# Patient Record
Sex: FEMALE | Race: WHITE | Employment: OTHER | URBAN - METROPOLITAN AREA
[De-identification: names, ages, dates, MRNs, and addresses within clinical notes are randomized per-mention and may not be internally consistent; named-entity substitution may affect disease eponyms.]

---

## 2017-03-26 ENCOUNTER — HOSPITAL ENCOUNTER (EMERGENCY)
Age: 72
Discharge: HOME | End: 2017-03-26
Attending: EMERGENCY MEDICINE
Payer: MEDICARE

## 2017-03-26 VITALS
DIASTOLIC BLOOD PRESSURE: 69 MMHG | TEMPERATURE: 98.4 F | HEART RATE: 61 BPM | HEIGHT: 65 IN | WEIGHT: 148 LBS | SYSTOLIC BLOOD PRESSURE: 108 MMHG | OXYGEN SATURATION: 100 % | BODY MASS INDEX: 24.66 KG/M2 | RESPIRATION RATE: 16 BRPM

## 2017-03-26 DIAGNOSIS — M71.21 BAKER'S CYST OF KNEE, RIGHT: ICD-10-CM

## 2017-03-26 DIAGNOSIS — M79.604 RIGHT LEG PAIN: Primary | ICD-10-CM

## 2017-03-26 PROCEDURE — 99282 EMERGENCY DEPT VISIT SF MDM: CPT

## 2017-03-26 PROCEDURE — 93971 EXTREMITY STUDY: CPT

## 2017-03-26 NOTE — ED TRIAGE NOTES
Pt was walking Thursday and reports feeling like someone hit her with a baseball bat in the right calf. She now has ecchymosis and a pulling sensation in her right calf. She denies and recent injury or long trips.  She has a history of afib and takes ASA 81mg daily

## 2017-03-26 NOTE — PROCEDURES
Fay Garay  *** FINAL REPORT ***    Name: Bertrand Lindsay  MRN: [id number]  : 16 May 1945  HIS Order #: [id number]  [id number] [facility] Visit #: [id number]  Date: 26 Mar 2017    TYPE OF TEST: Peripheral Venous Testing    REASON FOR TEST  Pain in limb, Limb swelling    Right Leg:-  Deep venous thrombosis:           No  Superficial venous thrombosis:    No  Deep venous insufficiency:        No  Superficial venous insufficiency: No      INTERPRETATION/FINDINGS  PROCEDURE:  RIGHT LOWER EXTREMITY  VENOUS DUPLEX. Evaluation of lower  extremity veins with ultrasound (B-mode imaging, pulsed Doppler, color   Doppler). Includes the common femoral, deep femoral, femoral,  popliteal, posterior tibial, peroneal, and great saphenous veins. Other veins, for example the gastrocnemius and soleal veins, may also  be visualized. FINDINGS: Alayne Bras scale and color flow duplex images of the veins in the  right lower extremity demonstrate normal compressibility, spontaneous  and augmented flow profiles, and absence of filling defects throughout   the deep and superficial veins in the right lower extremity. CONCLUSION: Right lower extremity venous duplex negative for deep  venous thrombosis or thrombophlebitis. Left common femoral vein is  thrombus free. NOTE: Avascular hypoechoic region noted right popliteal   fossa measuring 2.05 cm by 1.25 cm, cosnsitent with a Baker's cyst.    ADDITIONAL COMMENTS    I have personally reviewed the data relevant to the interpretation of  this  study. TECHNOLOGIST: LYNDSEY Thomas  Signed: 2017 02:13 PM    PHYSICIAN: Cleo Paige.  Romel Chilel MD  Signed: 2017 02:59 PM

## 2017-03-26 NOTE — ED PROVIDER NOTES
HPI Comments: 66 y.o. female with past medical history significant for A-fib and hypothyroidism who presents from home with chief complaint of calf pain. Pt complains of right calf pain described as a pulling sensation with associated right shin ecchymosis. Pt denies any recent travel or trauma. Pt denies CP, SOB, or abdominal pain. Pt has no other complaints. There are no other acute medical concerns at this time. Social hx: Tobacco smoker, 1 pack daily for 50 years    PCP: Sera Paulson MD    Note written by Kayden. Alissa Rosas, as dictated by Kevin Rose MD 12:45 PM      The history is provided by the patient. No  was used. Past Medical History:   Diagnosis Date    A-fib [facility])     Atrial fibrillation ([facility] [address]     Hypothyroidism        Past Surgical History:   Procedure Laterality Date    CARDIAC SURG PROCEDURE UNLIST      cardioversionx4    HX AFIB ABLATION  2016    HX CATARACT REMOVAL Left     HX CHOLECYSTECTOMY  08/26/2016    HX ORTHOPAEDIC Bilateral     arthroscopic knee surgery         Family History:   Problem Relation Age of Onset    Heart Disease Father     Cancer Paternal Aunt        Social History     Social History    Marital status: SINGLE     Spouse name: N/A    Number of children: N/A    Years of education: N/A     Occupational History    Not on file. Social History Main Topics    Smoking status: Current Every Day Smoker     Packs/day: 1.00     Years: 50.00    Smokeless tobacco: Not on file    Alcohol use Yes      Comment: social    Drug use: No    Sexual activity: Not Currently     Partners: Male     Other Topics Concern    Not on file     Social History Narrative         ALLERGIES: Review of patient's allergies indicates no known allergies. Review of Systems   Constitutional: Negative for chills and fever. HENT: Negative for ear pain and sore throat. Eyes: Negative for photophobia and pain.    Respiratory: Negative for chest tightness and shortness of breath. Cardiovascular: Negative for chest pain and leg swelling. Gastrointestinal: Negative for abdominal pain, nausea and vomiting. Genitourinary: Negative for dysuria and flank pain. Musculoskeletal: Negative for back pain and neck pain. Right leg pain   Skin: Negative for rash and wound. Right shin ecchymosis    Neurological: Negative for dizziness, light-headedness and headaches. Vitals:    03/26/17 1234   BP: 132/67   Pulse: 67   Resp: 16   Temp: 98.4 °F (36.9 °C)   SpO2: 99%   Weight: 67.1 kg (148 lb)   Height: 5' 5\" (1.651 m)            Physical Exam   Constitutional: She is oriented to person, place, and time. She appears well-developed and well-nourished. No distress. HENT:   Head: Normocephalic and atraumatic. Eyes: Conjunctivae and EOM are normal.   Neck: Normal range of motion. Cardiovascular: Normal rate, regular rhythm and intact distal pulses. Pulmonary/Chest: Effort normal. No stridor. No respiratory distress. Musculoskeletal: Normal range of motion. She exhibits tenderness. She exhibits no edema or deformity. Mild ecchymosis with tenderness of the anterior right lower leg   Neurological: She is alert and oriented to person, place, and time. No cranial nerve deficit. Skin: Skin is warm and dry. She is not diaphoretic. Psychiatric: She has a normal mood and affect. Nursing note and vitals reviewed.        MDM  Number of Diagnoses or Management Options  Baker's cyst of knee, right:   Right leg pain:   Diagnosis management comments: Right lower leg swelling and pain - [facility]  Doppler ordered    1415 - doppler neg for DVt, +baker's cyst, no EMC, d/c home       Amount and/or Complexity of Data Reviewed  Tests in the radiology section of CPT®: ordered and reviewed      ED Course       Procedures    Results    DUPLEX LOWER EXT VENOUS RIGHT (Order [id number])         Allergies        No Known Allergies     Result Information      Status Provider Status        Preliminary result (Collected: 3/26/2017  2:13 PM) Ordered        Imaging      DUPLEX LOWER EXT VENOUS RIGHT (Order #[id number]) on 3/26/2017 - Imaging Information       Exam Information      Status Exam Begun    Exam Ended        Preliminary [70]           IR Summary Links      IR Procedure Log       Transcription      Type ID     Vascular Result TRK     Draft copy - this document is not available for patient care     Document Text        []Hide copied text  []Hover for attribution information     [facility]  ** PRELIMINARY REPORT **     Name: Arina Guevara  MRN: [id number]  : 16 May 1945  HIS Order #: [id number]  [id number] [facility] Visit #: [id number]  Date: 26 Mar 2017     TYPE OF TEST: Peripheral Venous Testing     REASON FOR TEST  Pain in limb, Limb swelling     Right Leg:-  Deep venous thrombosis: No  Superficial venous thrombosis: No  Deep venous insufficiency: No  Superficial venous insufficiency: No        INTERPRETATION/FINDINGS  PROCEDURE: RIGHT LOWER EXTREMITY VENOUS DUPLEX. Evaluation of lower  extremity veins with ultrasound (B-mode imaging, pulsed Doppler, color  Doppler). Includes the common femoral, deep femoral, femoral,  popliteal, posterior tibial, peroneal, and great saphenous veins. Other veins, for example the gastrocnemius and soleal veins, may also  be visualized.     FINDINGS: Gray scale and color flow duplex images of the veins in the  right lower extremity demonstrate normal compressibility, spontaneous  and augmented flow profiles, and absence of filling defects throughout  the deep and superficial veins in the right lower extremity.     CONCLUSION: Right lower extremity venous duplex negative for deep  venous thrombosis or thrombophlebitis. Left common femoral vein is  thrombus free.  NOTE: Avascular hypoechoic region noted right popliteal  fossa measuring 2.05 cm by 1.25 cm, cosnsitent with a Baker's cyst.     ADDITIONAL COMMENTS     I have personally reviewed the data relevant to the interpretation of  this study.     TECHNOLOGIST: LYNDSEY Elizalde  Signed: 03/26/2017 02:13 PM                      Display only: Transcription (Keyshawn)

## 2017-03-26 NOTE — DISCHARGE INSTRUCTIONS
Baker's Cyst: Care Instructions  Your Care Instructions    A Baker's cyst is a swelling behind the knee. It may cause pain or stiffness when you bend your knee or straighten it all the way. Baker's cysts are also called popliteal cysts. If you have arthritis or another condition that is the cause of the Baker's cyst, your doctor may treat that condition. A Baker's cyst may go away on its own. If not, or if it is causing a lot of discomfort, your doctor may drain the fluid that has built up behind the knee. In some cases, a Baker's cyst is removed in surgery. There are things you can do at home, such as staying off your leg, to reduce the swelling and pain. Follow-up care is a key part of your treatment and safety. Be sure to make and go to all appointments, and call your doctor if you are having problems. It's also a good idea to know your test results and keep a list of the medicines you take. How can you care for yourself at home? · Rest your knee as much as possible. · Ask your doctor if you can take an over-the-counter pain medicine, such as acetaminophen (Tylenol), ibuprofen (Advil, Motrin), or naproxen (Aleve). Be safe with medicines. Read and follow all instructions on the label. · Use a cane, a crutch, a walker, or another device if you need help to get around. These can help rest your knees. · If you have an elastic bandage, make sure it is snug but not so tight that your leg is numb, tingles, or swells below the bandage. Ask your doctor if you can loosen the bandage if it is too tight. · Follow your doctor's instructions about how much weight you can put on your knee. · Stay at a healthy weight. Being overweight puts extra strain on your knee. When should you call for help? Call 911 anytime you think you may need emergency care. For example, call if:  · You have sudden chest pain and shortness of breath, and you cough up blood.   Call your doctor now or seek immediate medical care if:  · You have signs of a blood clot, such as:  ¨ Pain in your calf, thigh, or groin. ¨ Redness and swelling in your leg or groin. · You have sudden swelling, warmth, or pain in any joint. · You have joint pain and a fever or rash. · You have such bad pain that you cannot use the joint. Watch closely for changes in your health, and be sure to contact your doctor if:  · You have mild joint symptoms that continue even with more than 6 weeks of care at home. · You have stomach pain or other problems with your medicine. Where can you learn more? Go to http://martínez-renny.info/. Enter R927 in the search box to learn more about \"Baker's Cyst: Care Instructions. \"  Current as of: May 23, 2016  Content Version: 11.1  © 0363-3175 [employer]. Care instructions adapted under license by "[employer]" (which disclaims liability or warranty for this information). If you have questions about a medical condition or this instruction, always ask your healthcare professional. [place] [address] any warranty or liability for your use of this information. We hope that we have addressed all of your medical concerns. The examination and treatment you received in the Emergency Department were for an emergent problem and were not intended as complete care. It is important that you follow up with your healthcare provider(s) for ongoing care. If your symptoms worsen or do not improve as expected, and you are unable to reach your usual health care provider(s), you should return to the Emergency Department. Today's healthcare is undergoing tremendous change, and patient satisfaction surveys are one of the many tools to assess the quality of medical care. You may receive a survey from the [employer] regarding your experience in the Emergency Department. I hope that your experience has been completely positive, particularly the medical care that I provided.   As such, please participate in the survey; anything less than excellent does not meet my expectations or intentions. [facility] and [facility] participate in nationally recognized quality of care measures. If your blood pressure is greater than 120/80, as reported below, we urge that you seek medical care to address the potential of high blood pressure, commonly known as hypertension. Hypertension can be hereditary or can be caused by certain medical conditions, pain, stress, or \"white coat syndrome. \"       Please make an appointment with your health care provider(s) for follow up of your Emergency Department visit. VITALS:   Patient Vitals for the past 8 hrs:   Temp Pulse Resp BP SpO2   17 1234 98.4 °F (36.9 °C) 67 16 132/67 99 %          Thank you for allowing us to provide you with medical care today. We realize that you have many choices for your emergency care needs. Please choose us in the future for any continued health care needs. Mecca Castaneda, [facility] / [facility], [facility].   Office: [phone]        [facility]  *** PRELIMINARY REPORT ***     Name: Selina Cabral  MRN: [id number]  : 16 May 1945  HIS Order #: [id number]  [id number] [facility] Visit #: [id number]  Date: 26 Mar 2017     TYPE OF TEST: Peripheral Venous Testing     REASON FOR TEST  Pain in limb, Limb swelling     Right Leg:-  Deep venous thrombosis: No  Superficial venous thrombosis: No  Deep venous insufficiency: No  Superficial venous insufficiency: No        INTERPRETATION/FINDINGS  PROCEDURE: RIGHT LOWER EXTREMITY VENOUS DUPLEX. Evaluation of lower  extremity veins with ultrasound (B-mode imaging, pulsed Doppler, color  Doppler). Includes the common femoral, deep femoral, femoral,  popliteal, posterior tibial, peroneal, and great saphenous veins.   Other veins, for example the gastrocnemius and soleal veins, may also  be visualized.     FINDINGS: Gray scale and color flow duplex images of the veins in the  right lower extremity demonstrate normal compressibility, spontaneous  and augmented flow profiles, and absence of filling defects throughout  the deep and superficial veins in the right lower extremity.     CONCLUSION: Right lower extremity venous duplex negative for deep  venous thrombosis or thrombophlebitis. Left common femoral vein is  thrombus free.  NOTE: Avascular hypoechoic region noted right popliteal  fossa measuring 2.05 cm by 1.25 cm, cosnsitent with a Baker's cyst.     ADDITIONAL COMMENTS     I have personally reviewed the data relevant to the interpretation of  this study.     TECHNOLOGIST: LYNDSEY Thomas  Signed: 03/26/2017 02:13 PM

## 2017-05-20 ENCOUNTER — APPOINTMENT (OUTPATIENT)
Dept: GENERAL RADIOLOGY | Age: 72
DRG: 310 | End: 2017-05-20
Attending: EMERGENCY MEDICINE
Payer: MEDICARE

## 2017-05-20 ENCOUNTER — HOSPITAL ENCOUNTER (INPATIENT)
Age: 72
LOS: 2 days | Discharge: HOME | DRG: 310 | End: 2017-05-22
Attending: EMERGENCY MEDICINE | Admitting: INTERNAL MEDICINE
Payer: MEDICARE

## 2017-05-20 DIAGNOSIS — I48.91 ATRIAL FIBRILLATION WITH RVR (HCC): Primary | ICD-10-CM

## 2017-05-20 PROBLEM — K04.7 DENTAL INFECTION: Status: ACTIVE | Noted: 2017-05-20

## 2017-05-20 LAB
ALBUMIN SERPL BCP-MCNC: 3.5 G/DL (ref 3.5–5)
ALBUMIN/GLOB SERPL: 0.9 {RATIO} (ref 1.1–2.2)
ALP SERPL-CCNC: 89 U/L (ref 45–117)
ALT SERPL-CCNC: 12 U/L (ref 12–78)
ANION GAP BLD CALC-SCNC: 8 MMOL/L (ref 5–15)
AST SERPL W P-5'-P-CCNC: 11 U/L (ref 15–37)
BASOPHILS # BLD AUTO: 0 K/UL (ref 0–0.1)
BASOPHILS # BLD: 0 % (ref 0–1)
BILIRUB SERPL-MCNC: 0.3 MG/DL (ref 0.2–1)
BNP SERPL-MCNC: 3019 PG/ML (ref 0–125)
BUN SERPL-MCNC: 19 MG/DL (ref 6–20)
BUN/CREAT SERPL: 20 (ref 12–20)
CALCIUM SERPL-MCNC: 8.8 MG/DL (ref 8.5–10.1)
CHLORIDE SERPL-SCNC: 104 MMOL/L (ref 97–108)
CK MB CFR SERPL CALC: NORMAL % (ref 0–2.5)
CK MB SERPL-MCNC: <1 NG/ML (ref 5–25)
CK SERPL-CCNC: 40 U/L (ref 26–192)
CO2 SERPL-SCNC: 27 MMOL/L (ref 21–32)
CREAT SERPL-MCNC: 0.95 MG/DL (ref 0.55–1.02)
EOSINOPHIL # BLD: 0.3 K/UL (ref 0–0.4)
EOSINOPHIL NFR BLD: 3 % (ref 0–7)
ERYTHROCYTE [DISTWIDTH] IN BLOOD BY AUTOMATED COUNT: 15.3 % (ref 11.5–14.5)
GLOBULIN SER CALC-MCNC: 3.9 G/DL (ref 2–4)
GLUCOSE SERPL-MCNC: 120 MG/DL (ref 65–100)
HCT VFR BLD AUTO: 43.1 % (ref 35–47)
HGB BLD-MCNC: 14.4 G/DL (ref 11.5–16)
INR PPP: 1 (ref 0.9–1.1)
LACTATE SERPL-SCNC: 1 MMOL/L (ref 0.4–2)
LIPASE SERPL-CCNC: 105 U/L (ref 73–393)
LYMPHOCYTES # BLD AUTO: 25 % (ref 12–49)
LYMPHOCYTES # BLD: 2.6 K/UL (ref 0.8–3.5)
MCH RBC QN AUTO: 31.4 PG (ref 26–34)
MCHC RBC AUTO-ENTMCNC: 33.4 G/DL (ref 30–36.5)
MCV RBC AUTO: 93.9 FL (ref 80–99)
MONOCYTES # BLD: 1 K/UL (ref 0–1)
MONOCYTES NFR BLD AUTO: 9 % (ref 5–13)
NEUTS SEG # BLD: 6.5 K/UL (ref 1.8–8)
NEUTS SEG NFR BLD AUTO: 63 % (ref 32–75)
PLATELET # BLD AUTO: 295 K/UL (ref 150–400)
POTASSIUM SERPL-SCNC: 3.6 MMOL/L (ref 3.5–5.1)
PROT SERPL-MCNC: 7.4 G/DL (ref 6.4–8.2)
PROTHROMBIN TIME: 10 SEC (ref 9–11.1)
RBC # BLD AUTO: 4.59 M/UL (ref 3.8–5.2)
SODIUM SERPL-SCNC: 139 MMOL/L (ref 136–145)
T4 FREE SERPL-MCNC: 1.4 NG/DL (ref 0.8–1.5)
TROPONIN I SERPL-MCNC: <0.04 NG/ML
TROPONIN I SERPL-MCNC: <0.04 NG/ML
TSH SERPL DL<=0.05 MIU/L-ACNC: 0.64 UIU/ML (ref 0.36–3.74)
WBC # BLD AUTO: 10.5 K/UL (ref 3.6–11)

## 2017-05-20 PROCEDURE — 74011250636 HC RX REV CODE- 250/636: Performed by: INTERNAL MEDICINE

## 2017-05-20 PROCEDURE — 74011250637 HC RX REV CODE- 250/637: Performed by: SPECIALIST

## 2017-05-20 PROCEDURE — 84443 ASSAY THYROID STIM HORMONE: CPT | Performed by: EMERGENCY MEDICINE

## 2017-05-20 PROCEDURE — 83880 ASSAY OF NATRIURETIC PEPTIDE: CPT | Performed by: EMERGENCY MEDICINE

## 2017-05-20 PROCEDURE — 93005 ELECTROCARDIOGRAM TRACING: CPT

## 2017-05-20 PROCEDURE — 99285 EMERGENCY DEPT VISIT HI MDM: CPT

## 2017-05-20 PROCEDURE — 84484 ASSAY OF TROPONIN QUANT: CPT | Performed by: EMERGENCY MEDICINE

## 2017-05-20 PROCEDURE — 74011250637 HC RX REV CODE- 250/637: Performed by: INTERNAL MEDICINE

## 2017-05-20 PROCEDURE — 74011000258 HC RX REV CODE- 258: Performed by: EMERGENCY MEDICINE

## 2017-05-20 PROCEDURE — 85610 PROTHROMBIN TIME: CPT | Performed by: EMERGENCY MEDICINE

## 2017-05-20 PROCEDURE — 82550 ASSAY OF CK (CPK): CPT | Performed by: EMERGENCY MEDICINE

## 2017-05-20 PROCEDURE — 74011000250 HC RX REV CODE- 250: Performed by: EMERGENCY MEDICINE

## 2017-05-20 PROCEDURE — 83605 ASSAY OF LACTIC ACID: CPT | Performed by: EMERGENCY MEDICINE

## 2017-05-20 PROCEDURE — 74011250636 HC RX REV CODE- 250/636: Performed by: EMERGENCY MEDICINE

## 2017-05-20 PROCEDURE — 83690 ASSAY OF LIPASE: CPT | Performed by: EMERGENCY MEDICINE

## 2017-05-20 PROCEDURE — 71010 XR CHEST PORT: CPT

## 2017-05-20 PROCEDURE — 65660000000 HC RM CCU STEPDOWN

## 2017-05-20 PROCEDURE — 94761 N-INVAS EAR/PLS OXIMETRY MLT: CPT

## 2017-05-20 PROCEDURE — 99284 EMERGENCY DEPT VISIT MOD MDM: CPT

## 2017-05-20 PROCEDURE — 96365 THER/PROPH/DIAG IV INF INIT: CPT

## 2017-05-20 PROCEDURE — 74011250636 HC RX REV CODE- 250/636: Performed by: SPECIALIST

## 2017-05-20 PROCEDURE — 94762 N-INVAS EAR/PLS OXIMTRY CONT: CPT

## 2017-05-20 PROCEDURE — 96376 TX/PRO/DX INJ SAME DRUG ADON: CPT

## 2017-05-20 PROCEDURE — 85025 COMPLETE CBC W/AUTO DIFF WBC: CPT | Performed by: EMERGENCY MEDICINE

## 2017-05-20 PROCEDURE — 80053 COMPREHEN METABOLIC PANEL: CPT | Performed by: EMERGENCY MEDICINE

## 2017-05-20 PROCEDURE — 36415 COLL VENOUS BLD VENIPUNCTURE: CPT | Performed by: INTERNAL MEDICINE

## 2017-05-20 PROCEDURE — 84439 ASSAY OF FREE THYROXINE: CPT | Performed by: EMERGENCY MEDICINE

## 2017-05-20 PROCEDURE — 87040 BLOOD CULTURE FOR BACTERIA: CPT | Performed by: EMERGENCY MEDICINE

## 2017-05-20 RX ORDER — SODIUM CHLORIDE 9 MG/ML
75 INJECTION, SOLUTION INTRAVENOUS CONTINUOUS
Status: DISCONTINUED | OUTPATIENT
Start: 2017-05-20 | End: 2017-05-21

## 2017-05-20 RX ORDER — GUAIFENESIN 100 MG/5ML
81 LIQUID (ML) ORAL DAILY
Status: DISCONTINUED | OUTPATIENT
Start: 2017-05-21 | End: 2017-05-22 | Stop reason: HOSPADM

## 2017-05-20 RX ORDER — ENOXAPARIN SODIUM 100 MG/ML
1 INJECTION SUBCUTANEOUS
Status: DISCONTINUED | OUTPATIENT
Start: 2017-05-20 | End: 2017-05-20

## 2017-05-20 RX ORDER — ENOXAPARIN SODIUM 100 MG/ML
40 INJECTION SUBCUTANEOUS EVERY 24 HOURS
Status: DISCONTINUED | OUTPATIENT
Start: 2017-05-20 | End: 2017-05-20

## 2017-05-20 RX ORDER — ENOXAPARIN SODIUM 100 MG/ML
1 INJECTION SUBCUTANEOUS EVERY 12 HOURS
Status: DISCONTINUED | OUTPATIENT
Start: 2017-05-20 | End: 2017-05-20

## 2017-05-20 RX ORDER — ENOXAPARIN SODIUM 100 MG/ML
30 INJECTION SUBCUTANEOUS EVERY 12 HOURS
Status: DISCONTINUED | OUTPATIENT
Start: 2017-05-20 | End: 2017-05-21

## 2017-05-20 RX ORDER — CLINDAMYCIN HYDROCHLORIDE 150 MG/1
150 CAPSULE ORAL EVERY 6 HOURS
COMMUNITY
End: 2017-05-22

## 2017-05-20 RX ORDER — SODIUM CHLORIDE 0.9 % (FLUSH) 0.9 %
5-10 SYRINGE (ML) INJECTION EVERY 8 HOURS
Status: DISCONTINUED | OUTPATIENT
Start: 2017-05-20 | End: 2017-05-22 | Stop reason: HOSPADM

## 2017-05-20 RX ORDER — DILTIAZEM HYDROCHLORIDE 5 MG/ML
20 INJECTION INTRAVENOUS
Status: COMPLETED | OUTPATIENT
Start: 2017-05-20 | End: 2017-05-20

## 2017-05-20 RX ORDER — ACETAMINOPHEN 325 MG/1
650 TABLET ORAL
Status: DISCONTINUED | OUTPATIENT
Start: 2017-05-20 | End: 2017-05-22 | Stop reason: HOSPADM

## 2017-05-20 RX ORDER — SODIUM CHLORIDE 0.9 % (FLUSH) 0.9 %
5-10 SYRINGE (ML) INJECTION AS NEEDED
Status: DISCONTINUED | OUTPATIENT
Start: 2017-05-20 | End: 2017-05-22 | Stop reason: HOSPADM

## 2017-05-20 RX ORDER — ENOXAPARIN SODIUM 100 MG/ML
40 INJECTION SUBCUTANEOUS EVERY 12 HOURS
Status: DISCONTINUED | OUTPATIENT
Start: 2017-05-20 | End: 2017-05-21

## 2017-05-20 RX ORDER — LEVOTHYROXINE SODIUM 88 UG/1
88 TABLET ORAL
Status: DISCONTINUED | OUTPATIENT
Start: 2017-05-21 | End: 2017-05-22 | Stop reason: HOSPADM

## 2017-05-20 RX ORDER — HYDROCODONE BITARTRATE AND ACETAMINOPHEN 5; 325 MG/1; MG/1
1 TABLET ORAL
Status: DISCONTINUED | OUTPATIENT
Start: 2017-05-20 | End: 2017-05-22 | Stop reason: HOSPADM

## 2017-05-20 RX ORDER — METOPROLOL TARTRATE 25 MG/1
25 TABLET, FILM COATED ORAL EVERY 6 HOURS
Status: DISCONTINUED | OUTPATIENT
Start: 2017-05-20 | End: 2017-05-21

## 2017-05-20 RX ORDER — CLINDAMYCIN HYDROCHLORIDE 150 MG/1
150 CAPSULE ORAL EVERY 6 HOURS
Status: DISCONTINUED | OUTPATIENT
Start: 2017-05-20 | End: 2017-05-22 | Stop reason: HOSPADM

## 2017-05-20 RX ORDER — SODIUM CHLORIDE 0.9 % (FLUSH) 0.9 %
5-10 SYRINGE (ML) INJECTION AS NEEDED
Status: DISCONTINUED | OUTPATIENT
Start: 2017-05-20 | End: 2017-05-20 | Stop reason: SDUPTHER

## 2017-05-20 RX ADMIN — ENOXAPARIN SODIUM 40 MG: 40 INJECTION SUBCUTANEOUS at 19:57

## 2017-05-20 RX ADMIN — CLINDAMYCIN HYDROCHLORIDE 150 MG: 150 CAPSULE ORAL at 19:57

## 2017-05-20 RX ADMIN — HYDROCODONE BITARTRATE AND ACETAMINOPHEN 1 TABLET: 5; 325 TABLET ORAL at 20:53

## 2017-05-20 RX ADMIN — ENOXAPARIN SODIUM 30 MG: 30 INJECTION SUBCUTANEOUS at 19:58

## 2017-05-20 RX ADMIN — METOPROLOL TARTRATE 25 MG: 25 TABLET ORAL at 19:57

## 2017-05-20 RX ADMIN — DILTIAZEM HYDROCHLORIDE 2.5 MG/HR: 5 INJECTION INTRAVENOUS at 16:00

## 2017-05-20 RX ADMIN — SODIUM CHLORIDE 1000 ML: 900 INJECTION, SOLUTION INTRAVENOUS at 15:43

## 2017-05-20 RX ADMIN — DILTIAZEM HYDROCHLORIDE 20 MG: 5 INJECTION INTRAVENOUS at 15:44

## 2017-05-20 RX ADMIN — Medication 10 ML: at 23:20

## 2017-05-20 RX ADMIN — METOPROLOL TARTRATE 25 MG: 25 TABLET ORAL at 23:20

## 2017-05-20 RX ADMIN — Medication 10 ML: at 17:11

## 2017-05-20 RX ADMIN — SODIUM CHLORIDE 75 ML/HR: 900 INJECTION, SOLUTION INTRAVENOUS at 20:00

## 2017-05-20 NOTE — H&P
[address]  [phone]    Hospitalist Admission Note      NAME:  Divya Helm   :   1945   MRN:  [id number]     PCP:  None     Date/Time:  2017 4:49 PM          Subjective:     CHIEF COMPLAINT: Palpitations, SOB     HISTORY OF PRESENT ILLNESS:     Ms. Chuy Marley is a 74 y.o.  female with a hx of hypothyroidism, Afib, tobacco abuse who presented to the Emergency Department complaining of 2 weeks of progressive maxillary and mouth pain associated with headache and neck pain. Sx evolved over past 2 days to include exertional dyspnea and palpitations. Patient was seen by a dentist and placed on clindamycin with f/u appt with periodontist next week. She reports pleuritic chest pain. She is not on anticoag other than baby aspirin. We will admit for further management. Past Medical History:   Diagnosis Date    A-fib [facility])     Atrial fibrillation ([address]     Hypothyroidism         Past Surgical History:   Procedure Laterality Date    CARDIAC SURG PROCEDURE UNLIST      cardioversionx4    HX AFIB ABLATION  2016    HX CATARACT REMOVAL Left     HX CHOLECYSTECTOMY  2016    HX ORTHOPAEDIC Bilateral     arthroscopic knee surgery       Social History   Substance Use Topics    Smoking status: Current Every Day Smoker     Packs/day: 1.00     Years: 50.00    Smokeless tobacco: Not on file    Alcohol use Yes      Comment: social        Family History   Problem Relation Age of Onset    Heart Disease Father     Cancer Paternal Aunt         No Known Allergies     Prior to Admission medications    Medication Sig Start Date End Date Taking? Authorizing Provider   clindamycin (CLEOCIN) 150 mg capsule Take 150 mg by mouth every six (6) hours. Yes Historical Provider   levothyroxine (SYNTHROID) 88 mcg tablet Take 88 mcg by mouth Daily (before breakfast).    Yes Historical Provider   aspirin delayed-release 81 mg tablet Take 81 mg by mouth daily. Last dose per dr Alejandra Haile   Yes Historical Provider       Review of Systems:  (bold if positive, if negative)    Gen:  Eyes:  ENT:  CVS:  Palpitations, chest painPulm:  dyspneaGI:    :    MS:  Skin:  Psych:  Endo:    Hem:  Renal:    Neuro:        Objective:      VITALS:    Vital signs reviewed; most recent are:    Visit Vitals    /60    Pulse (!) 108    Temp 98.5 °F (36.9 °C)    Resp 15    Ht 5' 5\" (1.651 m)    Wt 72.6 kg (160 lb)    SpO2 98%    BMI 26.63 kg/m2     SpO2 Readings from Last 6 Encounters:   05/20/17 98%   03/26/17 100%   08/28/16 94%   09/25/15 95%   08/19/14 100%   04/01/14 93%        No intake or output data in the 24 hours ending 05/20/17 1649     Exam:     Physical Exam:    Gen:  Well-developed, well-nourished, in no acute distress  HEENT:  Pink conjunctivae, PERRL, hearing intact to voice, moist mucous membranes, mouth with multiple dental caries but no overt abscess, no gangrenous gingivitis, no maxillary or mandibular tenderness  Neck:  Supple, without masses, thyroid non-tender  Resp:  No accessory muscle use, clear breath sounds without wheezes rales or rhonchi  Card:   Tachy, irregularly irregular No murmurs, normal S1, S2 without thrills, bruits or peripheral edema  Abd:  Soft, non-tender, non-distended, normoactive bowel sounds are present, no palpable organomegaly and no detectable hernias  Lymph:  No cervical or inguinal adenopathy  Musc:  No cyanosis or clubbing  Skin:  No rashes or ulcers, skin turgor is good  Neuro:  Cranial nerves are grossly intact, no focal motor weakness, follows commands appropriately  Psych:  Good insight, oriented to person, place and time, alert     Labs:    Recent Labs      05/20/17   1540   WBC  10.5   HGB  14.4   HCT  43.1   PLT  295     Recent Labs      05/20/17   1540   NA  139   K  3.6   CL  104   CO2  27   GLU  120*   BUN  19   CREA  0.95   CA  8.8   ALB  3.5   TBILI  0.3   SGOT  11*   ALT  12     No results found for: GLUCPOC  No results for input(s): PH, PCO2, PO2, HCO3, FIO2 in the last 72 hours. Recent Labs      05/20/17   1540   INR  1.0     All Micro Results     None        ECG: Reviewed. Atrial fibrillation with rapid ventricular response    I have reviewed previous records       Assessment and Plan: Active Problems:    Atrial fibrillation with rapid ventricular response S/P ablation of atrial fibrillation. Precipitating event may be dental infection v. Structural heart disease with elevated pro-BNP. Admit to SD. IV dilt gtt. Cardiology consult. Serial Edin. Update TTE. Monitor on tele. Watch resp status closely. CHADSVASC score is 2 and ideally would need an [address], [facility] ppx for now (no need for bridging therapy). Hashimoto's thyroiditis. TSH low nml. Continue LT4    Dental infection. No overt abscess or clinical signs of spread to sinus or calvarium. Continue clindamycin. Outpatient follow-up with periodontist     Tobacco abuse disorder.  Counseled on smoking cessation    Telemetry reviewed:   AFIB    Risk of deterioration: high      Total time spent with patient: [address] discussed with: Patient, Nursing Staff and >50% of time spent in counseling and coordination of care    Discussed:  Care Plan and D/C Planning       ___________________________________________________    Attending Physician: Brittnee Walker DO

## 2017-05-20 NOTE — IP AVS SNAPSHOT
[address] 
[phone] Patient: Missy Ng MRN: [id number] RLS:9/02/[birth year]99 You are allergic to the following No active allergies Recent Documentation Height Weight Breastfeeding? BMI OB Status Smoking Status 1.651 m 69.4 kg No 25.46 kg/m2 Postmenopausal Current Every Day Smoker Unresulted Labs Order Current Status CULTURE, BLOOD Preliminary result CULTURE, BLOOD Preliminary result Emergency Contacts Name Discharge Info Relation Home Work Mobile [address] [facility] CAREGIVER [3] Child [2] [phone] About your hospitalization You were admitted on:  May 20, 2017 You [facility] received [facility] in the:  [facility] You were discharged on:  May 22, 2017 Unit phone number:  [phone] Why you were hospitalized Your primary diagnosis was:  Not on File Your diagnoses also included:  Atrial Fibrillation With Rapid Ventricular Response (Hcc), Dental Infection, S/P Ablation Of Atrial Fibrillation, Hashimoto's Thyroiditis Providers Seen During Your Hospitalizations Provider Role Specialty Primary office phone Quinton Lawson MD Attending Provider Emergency Medicine [phone] Lyly Serrano MD Attending Provider Internal Medicine [phone] Karri Fields DO Attending Provider Internal Medicine [phone] Your Primary Care Physician (PCP) Primary Care Physician Office Phone Office Fax NONE ** None ** ** None ** Follow-up Information Follow up With Details Comments Contact Info Samuel Cabrera MD On 6/5/2017 9:40 am  566 [facility] [phone] [address] [place] [address] 
[phone] Current Discharge Medication List  
  
START taking these medications Dose & Instructions Dispensing Information Comments Morning Noon Evening Bedtime HYDROcodone-acetaminophen 5-325 mg per tablet Commonly known as:  Alivia Partida Your last dose was: Your next dose is:    
   
   
 Dose:  1 Tab Take 1 Tab by mouth every four (4) hours as needed. Max Daily Amount: 6 Tabs. Quantity:  12 Tab Refills:  0  
     
   
   
   
  
 metoprolol tartrate 50 mg tablet Commonly known as:  LOPRESSOR Your last dose was: Your next dose is:    
   
   
 Dose:  50 mg Take 1 Tab by mouth two (2) times a day. Quantity:  60 Tab Refills:  0  
     
   
   
   
  
 rivaroxaban 20 mg Tab tablet Commonly known as:  Yulissa Moreland Your last dose was: Your next dose is:    
   
   
 Dose:  20 mg Take 1 Tab by mouth daily (with dinner). Quantity:  30 Tab Refills:  0 CONTINUE these medications which have NOT CHANGED Dose & Instructions Dispensing Information Comments Morning Noon Evening Bedtime  
 aspirin delayed-release 81 mg tablet Your last dose was: Your next dose is:    
   
   
 Dose:  81 mg Take 81 mg by mouth daily. Last dose per dr Alejandra Haile Refills:  0  
     
   
   
   
  
 levothyroxine 88 mcg tablet Commonly known as:  SYNTHROID Your last dose was: Your next dose is:    
   
   
 Dose:  88 mcg Take 88 mcg by mouth Daily (before breakfast). Refills:  0 STOP taking these medications   
 clindamycin 150 mg capsule Commonly known as:  CLEOCIN Where to Get Your Medications Information on where to get these meds will be given to you by the nurse or doctor. ! Ask your nurse or doctor about these medications HYDROcodone-acetaminophen 5-325 mg per tablet  
 metoprolol tartrate 50 mg tablet  
 rivaroxaban 20 mg Tab tablet Discharge Instructions Patient Discharge Instructions Ramakrishna Nino / [id number] : 1945 Admitted 2017 Discharged: 2017 Primary Diagnoses Problem List as of 5/22/2017  Date Reviewed: 5/20/2017 Codes Class Noted - Resolved Atrial fibrillation with rapid ventricular response (HCC) ICD-10-CM: I48.91 
ICD-9-CM: 427.31  5/20/2017 - Present Dental infection ICD-10-CM: K04.7 ICD-9-CM: 522.4  5/20/2017 - Present Calculus of gallbladder with chronic cholecystitis without obstruction ICD-10-CM: K80.10 ICD-9-CM: 574.10  8/15/2016 - Present S/P ablation of atrial fibrillation ICD-10-CM: Z98.890, Z86.79 
ICD-9-CM: V45.89  8/15/2016 - Present Hashimoto's thyroiditis ICD-10-CM: E06.3 ICD-9-CM: 245.2  8/15/2016 - Present Irritable bowel syndrome with diarrhea ICD-10-CM: K58.0 ICD-9-CM: 564.1  8/15/2016 - Present Paroxysmal a-fib (HCC) ICD-10-CM: I48.0 ICD-9-CM: 427.31  9/24/2015 - Present RESOLVED: Cataract ICD-10-CM: H26.9 ICD-9-CM: 366.9  8/19/2014 - 8/19/2014 Take Home Medications · It is important that you take the medication exactly as they are prescribed. · Keep your medication in the bottles provided by the pharmacist and keep a list of the medication names, dosages, and times to be taken in your wallet. · Do not take other medications without consulting your doctor. What to do at [facility] Recommended diet: Cardiac Diet Recommended activity: Activity as tolerated If you experience sudden onset chest pain, shortness of breath that doesn't improve with rest, please follow up with nearest ER. Follow-up with your Dr. Michael Yanes as scheduled Information obtained by : 
I understand that if any problems occur once I am at home I am to contact my physician. I understand and acknowledge receipt of the instructions indicated above.   
 
                                                                                                                                     
Physician's or R.N.'s Signature Date/Time Patient or Representative Signature                                                          Date/Time Atrial Fibrillation: Care Instructions Your Care Instructions Atrial fibrillation is an irregular and often fast heartbeat. Treating this condition is important for several reasons. It can cause blood clots, which can travel from your heart to your brain and cause a stroke. If you have a fast heartbeat, you may feel lightheaded, dizzy, and weak. An irregular heartbeat can also increase your risk for heart failure. Atrial fibrillation is often the result of another heart condition, such as high blood pressure or coronary artery disease. Making changes to improve your heart condition will help you stay healthy and active. Follow-up care is a key part of your treatment and safety. Be sure to make and go to all appointments, and call your doctor if you are having problems. It's also a good idea to know your test results and keep a list of the medicines you take. How can you care for yourself at home? Medicines · Take your medicines exactly as prescribed. Call your doctor if you think you are having a problem with your medicine. You will get more details on the specific medicines your doctor prescribes. · If your doctor has given you a blood thinner to prevent a stroke, be sure you get instructions about how to take your medicine safely. Blood thinners can cause serious bleeding problems. · Do not take any vitamins, over-the-counter drugs, or herbal products without talking to your doctor first. 
Lifestyle changes · Do not smoke. Smoking can increase your chance of a stroke and heart attack. If you need help quitting, talk to your doctor about stop-smoking programs and medicines.  These can increase your chances of quitting for good. 
· Eat a heart-healthy diet. · Stay at a healthy weight. Lose weight if you need to. · Limit alcohol to 2 drinks a day for men and 1 drink a day for women. Too much alcohol can cause health problems. · Avoid colds and flu. Get a pneumococcal vaccine shot. If you have had one before, ask your doctor whether you need another dose. Get a flu shot every year. If you must be around people with colds or flu, wash your hands often. Activity · If your doctor recommends it, get more exercise. Walking is a good choice. Bit by bit, increase the amount you walk every day. Try for at least 30 minutes on most days of the week. You also may want to swim, bike, or do other activities. Your doctor may suggest that you join a cardiac rehabilitation program so that you can have help increasing your physical activity safely. · Start light exercise if your doctor says it is okay. Even a small amount will help you get stronger, have more energy, and manage stress. Walking is an easy way to get exercise. Start out by walking a little more than you did in the hospital. Gradually increase the amount you walk. · When you exercise, watch for signs that your heart is working too hard. You are pushing too hard if you cannot talk while you are exercising. If you become short of breath or dizzy or have chest pain, sit down and rest immediately. · Check your pulse regularly. Place two fingers on the artery at the palm side of your wrist, in line with your thumb. If your heartbeat seems uneven or fast, talk to your doctor. When should you call for help? Call 911 anytime you think you may need emergency care. For example, call if: 
· You have symptoms of a heart attack. These may include: ¨ Chest pain or pressure, or a strange feeling in the chest. 
¨ Sweating. ¨ Shortness of breath. ¨ Nausea or vomiting. ¨ Pain, pressure, or a strange feeling in the back, neck, jaw, or upper belly or in one or both shoulders or arms. ¨ Lightheadedness or sudden weakness. ¨ A fast or irregular heartbeat. After you call 911, the  may tell you to chew 1 adult-strength or 2 to 4 low-dose aspirin. Wait for an ambulance. Do not try to drive yourself. · You have symptoms of a stroke. These may include: 
¨ Sudden numbness, tingling, weakness, or loss of movement in your face, arm, or leg, especially on only one side of your body. ¨ Sudden vision changes. ¨ Sudden trouble speaking. ¨ Sudden confusion or trouble understanding simple statements. ¨ Sudden problems with walking or balance. ¨ A sudden, severe headache that is different from past headaches. · You passed out (lost consciousness). Call your doctor now or seek immediate medical care if: 
· You have new or increased shortness of breath. · You feel dizzy or lightheaded, or you feel like you may faint. · Your heart rate becomes irregular. · You can feel your heart flutter in your chest or skip heartbeats. Tell your doctor if these symptoms are new or worse. Watch closely for changes in your health, and be sure to contact your doctor if you have any problems. Where can you learn more? Go to http://martínez-renny.info/. Enter U020 in the search box to learn more about \"Atrial Fibrillation: Care Instructions. \" Current as of: January 27, 2016 Content Version: 11.2 © 2393-9774 [employer]. Care instructions adapted under license by [employer] (which disclaims liability or warranty for this information). If you have questions about a medical condition or this instruction, always ask your healthcare professional. [place] [address] any warranty or liability for your use of this information. Learning About Atrial Fibrillation What is atrial fibrillation? Atrial fibrillation (say \"AY-tree-jesus ahp-lrrd-EYH-shun\") is the most common type of irregular heartbeat (arrhythmia).  Normally, the heart beats in a strong, steady rhythm. In atrial fibrillation, a problem with the heart's electrical system causes the two upper parts of the heart (the atria) to quiver, or fibrillate. Your heart rate also may be faster than normal. 
Atrial fibrillation can be dangerous because if the heartbeat isn't strong and steady, blood can collect, or pool, in the atria. And pooled blood is more likely to form clots. Clots can travel to the brain, block blood flow, and cause a stroke. Atrial fibrillation can also lead to heart failure. Treatment for atrial fibrillation helps prevent stroke and heart failure. It also helps relieve symptoms. Atrial fibrillation is often caused by another heart problem. It may happen after heart surgery. It may also be caused by other problems, such as an overactive thyroid gland or lung disease. Many people with atrial fibrillation are able to live full and active lives. What are the symptoms? Some people feel symptoms when they have episodes of atrial fibrillation. But other people don't notice any symptoms. If you have symptoms, you may feel: · A fluttering, racing, or pounding feeling in your chest called palpitations. · Weak or tired. · Dizzy or lightheaded. · Short of breath. · Chest pain. · Confused. You may notice signs of atrial fibrillation when you check your pulse. Your pulse may seem uneven or fast. 
What can you expect when you have atrial fibrillation? At first, spells of atrial fibrillation may come on suddenly and last a short time. It may go away on its own or it goes away after treatment. This is called paroxysmal atrial fibrillation. Over time, the spells may last longer and occur more often. They often don't go away on their own. How is it treated? Treatments can help you feel better and prevent future problems, especially stroke and heart failure.  
The main types of treatment slow the heart rate, control the heart rhythm, and help prevent stroke. Your treatment will depend on the cause of your atrial fibrillation, your symptoms, and your risk for stroke. · Heart rate treatment. Medicine may be used to slow your heart rate. Your heartbeat may still be irregular. But these medicines keep your heart from beating too fast. They may also help relieve your symptoms. · Heart rhythm treatment. Different treatments may be used to try to stop atrial fibrillation and keep it from returning. They can also relieve symptoms. These treatments include medicine, electrical cardioversion to shock the heart back to a normal rhythm, a procedure called catheter ablation, and heart surgery. · Stroke prevention. You and your doctor can decide how to lower your risk. You may decide to take a blood-thinning medicine, such as aspirin or an anticoagulant. How can you live well with it? You can live well and help manage atrial fibrillation by having a heart-healthy lifestyle. To have a heart-healthy lifestyle: · Don't smoke. · Eat heart-healthy foods. · Be active. Talk to your doctor about what type and level of exercise is safe for you. · Stay at a healthy weight. Lose weight if you need to. · Manage stress. · Avoid alcohol if it triggers symptoms. · Manage other health problems such as high blood pressure, high cholesterol, and diabetes. · Avoid getting sick from the flu. Get a flu shot every year. When should you call for help? Call 911 anytime you think you may need emergency care. For example, call if: 
· You have symptoms of a stroke. These may include: 
¨ Sudden numbness, tingling, weakness, or loss of movement in your face, arm, or leg, especially on only one side of your body. ¨ Sudden vision changes. ¨ Sudden trouble speaking. ¨ Sudden confusion or trouble understanding simple statements. ¨ Sudden problems with walking or balance. ¨ A sudden, severe headache that is different from past headaches. Call your doctor now or seek immediate medical care if: 
· You have new or increased shortness of breath. · You feel dizzy or lightheaded, or you feel like you may faint. Watch closely for changes in your health, and be sure to contact your doctor if you have any problems. Follow-up care is a key part of your treatment and safety. Be sure to make and go to all appointments, and call your doctor if you are having problems. It's also a good idea to know your test results and keep a list of the medicines you take. Where can you learn more? Go to http://martínez-renny.info/. Enter [phone] in the search box to learn more about \"Learning About Atrial Fibrillation. \" Current as of: March 28, 2016 Content Version: 11.2 © 3953-3475 [employer]. Care instructions adapted under license by [employer] (which disclaims liability or warranty for this information). If you have questions about a medical condition or this instruction, always ask your healthcare professional. [place] [address] any warranty or liability for your use of this information. Discharge Orders None Introducing [facility]! [facility] introduces be2 patient portal. Now you can access parts of your medical record, email your doctor's office, and request medication refills online. 1. In your internet browser, go to [email or web address] 2. Click on the First Time User? Click Here link in the Sign In box. You will see the New Member Sign Up page. 3. Enter your be2 Access Code exactly as it appears below. You will not need to use this code after youve completed the sign-up process. If you do not sign up before the expiration date, you must request a new code. · be2 Access Code: [email or web address] Expires: 6/24/2017  1:07 PM 
 
4.  Enter the last four digits of your Social Security Number (xxxx) and Date of Birth (mm/dd/yyyy) as indicated and click Submit. You will be taken to the next sign-up page. 5. Create a PushSpring ID. This will be your PushSpring login ID and cannot be changed, so think of one that is secure and easy to remember. 6. Create a PushSpring password. You can change your password at any time. 7. Enter your Password Reset Question and Answer. This can be used at a later time if you forget your password. 8. Enter your e-mail address. You will receive e-mail notification when new information is available in [address] 9. Click Sign Up. You can now view and download portions of your medical record. 10. Click the Download Summary menu link to download a portable copy of your medical information. If you have questions, please visit the Frequently Asked Questions section of the PushSpring website. Remember, PushSpring is NOT to be used for urgent needs. For medical emergencies, dial 911. Now available from your iPhone and Android! General Information Please provide this summary of care documentation to your next provider. Patient Signature:  ____________________________________________________________ Date:  ____________________________________________________________  
  
Tomas Breath Provider Signature:  ____________________________________________________________ Date:  ____________________________________________________________

## 2017-05-20 NOTE — CONSULTS
Chelly Loyola MD [facility] - [facility] [address]  Office [phone]  Mobile [phone]    Date of  Admission: 5/20/2017  3:27 PM  PCP- None    Anjelica Su is a 74 y.o. female admitted for Atrial fibrillation with rapid ventricular response ([facility] [address] Consult requested by Estela Mcfarlane MD    Assessment  · Recurrent AF with RVR, s/p AF ablation 2015  · Associated with chest/jaw pain  · Recent MARY        Discussion/Recommendations:  Duration of AF unclear, chest and jaw pain may represent demand ischemia from tachycardia. No evidence of myocardial injury despite prolonged sx. Continue rate control with iv dilt, start oral metoprolol and anticoagulate with lovenox, eventually switch to Xarelto. Discussed rhythm control eventually. CAD evaluation with cath vs stress test based on clinical course    Unclear if she has underlying dental/sinus infection. Subjective:  Fair historian. Hx of AF s/p multiple cardioversions, s/p PV isolation 2015 in [place]. No structural or ischemic heart disease other than moderate MR by echo 2015 when in AF. No hx of HTN, DM. No alcohol or significant caffeine use. Significant family stressors noted. Reports right dental pain for past week, saw dentist - no clear infection but referred to periodontist. Noted MARY past 2 days. Yesterday developed jaw, neck and chest pain at rest, waxing/waning, much worse this morning. Seen in ED - AF with RVR. Sx resolved after rate controlled with iv dilt. Troponin negative. CXR negative. Patient denies any  palpitations, syncope, orthopnea, edema or paroxysmal nocturnal dyspnea. No hx of bleeding issues - tolerated Xarelto previously. No hx of stroke. Recently saw [facility] doc to establish cardiac care since moving to [place] from [facility].      Past Medical History:   Diagnosis Date    Atrial fibrillation ([facility] [address]     s/p pulm vein isolation 2015    Hypothyroidism       Past Surgical History: Procedure Laterality Date    CARDIAC SURG PROCEDURE UNLIST      cardioversionx4    HX AFIB ABLATION  2016    HX CATARACT REMOVAL Left     HX CHOLECYSTECTOMY  08/26/2016    HX ORTHOPAEDIC Bilateral     arthroscopic knee surgery     No current facility-administered medications on file prior to encounter. Current Outpatient Prescriptions on File Prior to Encounter   Medication Sig Dispense Refill    levothyroxine (SYNTHROID) 88 mcg tablet Take 88 mcg by mouth Daily (before breakfast).  aspirin delayed-release 81 mg tablet Take 81 mg by mouth daily. Last dose per dr Kain Black       No Known Allergies          Review of Symptoms:  Constitutional: negative for fevers and chills  Respiratory: negative for cough, sputum or hemoptysis  Gastrointestinal: negative for dysphagia, odynophagia, dyspepsia and abdominal pain  Musculoskeletal:negative for myalgias and arthralgias  Neurological: negative for vertigo, seizures and memory problems  Other systems reviewed and negative except as above. Physical Exam    Visit Vitals    /74    Pulse (!) 101    Temp 98.5 °F (36.9 °C)    Resp 16    Ht 5' 5\" (1.651 m)    Wt 160 lb (72.6 kg)    SpO2 97%    Breastfeeding No    BMI 26.63 kg/m2     Visit Vitals    /74    Pulse (!) 101    Temp 98.5 °F (36.9 °C)    Resp 16    Ht 5' 5\" (1.651 m)    Wt 160 lb (72.6 kg)    SpO2 97%    Breastfeeding No    BMI 26.63 kg/m2     General Appearance:  Well developed, well nourished,alert and oriented x 3, and individual in no acute distress. Ears/Nose/Mouth/Throat:   Hearing grossly normal.         Neck: Supple. Chest:   Lungs clear to auscultation bilaterally. Cardiovascular:  Regular rate and rhythm, S1, S2 normal, no murmur. Abdomen:   Soft, non-tender, bowel sounds are active. Extremities: No edema bilaterally. Skin: Warm and dry.                Cardiographics    Telemetry: AFIB/90s  ECG: atrial fibrillation, rate 140s        Recent Results (from the past 12 hour(s))   EKG, 12 LEAD, INITIAL    Collection Time: 05/20/17  3:33 PM   Result Value Ref Range    Ventricular Rate 171 BPM    Atrial Rate 182 BPM    QRS Duration 76 ms    Q-T Interval 272 ms    QTC Calculation (Bezet) 458 ms    Calculated R Axis 9 degrees    Calculated T Axis -174 degrees    Diagnosis       Atrial fibrillation with rapid ventricular response  Nonspecific ST and T wave abnormality  Abnormal ECG  No previous ECGs available     PROTHROMBIN TIME + INR    Collection Time: 05/20/17  3:40 PM   Result Value Ref Range    INR 1.0 0.9 - 1.1      Prothrombin time 10.0 9.0 - 11.1 sec   TROPONIN I    Collection Time: 05/20/17  3:40 PM   Result Value Ref Range    Troponin-I, Qt. <0.04 <0.05 ng/mL   LIPASE    Collection Time: 05/20/17  3:40 PM   Result Value Ref Range    Lipase 105 73 - 500 U/L   METABOLIC PANEL, COMPREHENSIVE    Collection Time: 05/20/17  3:40 PM   Result Value Ref Range    Sodium 139 136 - 145 mmol/L    Potassium 3.6 3.5 - 5.1 mmol/L    Chloride 104 97 - 108 mmol/L    CO2 27 21 - 32 mmol/L    Anion gap 8 5 - 15 mmol/L    Glucose 120 (H) 65 - 100 mg/dL    BUN 19 6 - 20 MG/DL    Creatinine 0.95 0.55 - 1.02 MG/DL    BUN/Creatinine ratio 20 12 - 20      GFR est AA >60 >60 ml/min/1.73m2    GFR est non-AA 58 (L) >60 ml/min/1.73m2    Calcium 8.8 8.5 - 10.1 MG/DL    Bilirubin, total 0.3 0.2 - 1.0 MG/DL    ALT (SGPT) 12 12 - 78 U/L    AST (SGOT) 11 (L) 15 - 37 U/L    Alk.  phosphatase 89 45 - 117 U/L    Protein, total 7.4 6.4 - 8.2 g/dL    Albumin 3.5 3.5 - 5.0 g/dL    Globulin 3.9 2.0 - 4.0 g/dL    A-G Ratio 0.9 (L) 1.1 - 2.2     CK W/ CKMB & INDEX    Collection Time: 05/20/17  3:40 PM   Result Value Ref Range    CK 40 26 - 192 U/L    CK - MB <1.0 <3.6 NG/ML    CK-MB Index Cannot be calulated 0 - 2.5     CBC WITH AUTOMATED DIFF    Collection Time: 05/20/17  3:40 PM   Result Value Ref Range    WBC 10.5 3.6 - 11.0 K/uL    RBC 4.59 3.80 - 5.20 M/uL    HGB 14.4 11.5 - 16.0 g/dL    HCT 43.1 35.0 - 47.0 %    MCV 93.9 80.0 - 99.0 FL    MCH 31.4 26.0 - 34.0 PG    MCHC 33.4 30.0 - 36.5 g/dL    RDW 15.3 (H) 11.5 - 14.5 %    PLATELET 197 038 - 381 K/uL    NEUTROPHILS 63 32 - 75 %    LYMPHOCYTES 25 12 - 49 %    MONOCYTES 9 5 - 13 %    EOSINOPHILS 3 0 - 7 %    BASOPHILS 0 0 - 1 %    ABS. NEUTROPHILS 6.5 1.8 - 8.0 K/UL    ABS. LYMPHOCYTES 2.6 0.8 - 3.5 K/UL    ABS. MONOCYTES 1.0 0.0 - 1.0 K/UL    ABS. EOSINOPHILS 0.3 0.0 - 0.4 K/UL    ABS.  BASOPHILS 0.0 0.0 - 0.1 K/UL   PRO-BNP    Collection Time: 05/20/17  3:40 PM   Result Value Ref Range    NT pro-BNP 3019 (H) 0 - 125 PG/ML   TSH 3RD GENERATION    Collection Time: 05/20/17  3:40 PM   Result Value Ref Range    TSH 0.64 0.36 - 3.74 uIU/mL

## 2017-05-20 NOTE — PROGRESS NOTES
[facility]: MED RECONCILIATION    Comments/Recommendations:   Patient states she started her Clindamycin about 3-4 days ago. She admits she does not always take all four doses per day. Medication(s) ADDED to PTA list:  1. Clindamycin 150 mg Q 6 hrs x 7 days     Medication(s) REMOVED from PTA list:  1. none    Medication(s) ADJUSTED on PTA list:  1. none      Information obtained from: patient/ Rx Query    Significant PMH/Disease States:   Past Medical History:   Diagnosis Date    [facility]fib ([facility] [address]     Atrial fibrillation ([facility] [address]     Hypothyroidism        Chief Complaint for this Admission:   Chief Complaint   Patient presents with    Chest Pain         Allergies: Review of patient's allergies indicates no known allergies. Prior to Admission Medications:     Medication Documentation Review Audit       Reviewed by Shahla Page, PHARMD (Pharmacist) on 05/20/17 at 1646         Medication Sig Documenting Provider Last Dose Status Taking?      aspirin delayed-release 81 mg tablet Take 81 mg by mouth daily. Last dose per dr Enedelia Romero Historical Provider 5/20/2017 am Active Yes    clindamycin (CLEOCIN) 150 mg capsule Take 150 mg by mouth every six (6) hours. Historical Provider 5/20/2017 1400 Active Yes    levothyroxine (SYNTHROID) 88 mcg tablet Take 88 mcg by mouth Daily (before breakfast).  Historical Provider 5/20/2017 am Active Yes                        LUIS ARMANDO Estrada   Contact: [phone]

## 2017-05-20 NOTE — ED TRIAGE NOTES
Pt c/o jaw pain chest pain and headache that started yesterday. She has a history of afib. Pt is on cell phone in triage.

## 2017-05-20 NOTE — ED PROVIDER NOTES
HPI Comments: 74 y.o. female with past medical history significant for atrial fibrillation and hypothyroidism who presents from home with chief complaint of chest pain. Pt states that she had jaw pain and ear pain for the past two weeks. Pt originally thought she had a toothache and made an appointment with a periodontist next week. She reports starting to feel chest pain yesterday morning. Pt states that the pain is sharp and pleuritic with associated shortness of breath. She reports that the pain radiates to her jaw, ears, throat, and the back of her head. Pt has hx of a fib and has been taking baby aspirin after her cardioversion. Pt denies having fever, chills, N/V/D, vaginal discharge, dizziness, or numbness. She denies any recent travel. Pt is originally from [place] and is only in [place] for 6 months. There are no other acute medical concerns at this time. Social hx: everyday smoker, + EtOH use, no drug use  PCP: Joey Guzman MD    Note written by Anya Umanzor, as dictated by Zay Domingo MD 3:44 PM      The history is provided by the patient. No  was used. Past Medical History:   Diagnosis Date    A-fib [facility])     Atrial fibrillation ([address]     Hypothyroidism        Past Surgical History:   Procedure Laterality Date    CARDIAC SURG PROCEDURE UNLIST      cardioversionx4    HX AFIB ABLATION  2016    HX CATARACT REMOVAL Left     HX CHOLECYSTECTOMY  08/26/2016    HX ORTHOPAEDIC Bilateral     arthroscopic knee surgery         Family History:   Problem Relation Age of Onset    Heart Disease Father     Cancer Paternal Aunt        Social History     Social History    Marital status: SINGLE     Spouse name: N/A    Number of children: N/A    Years of education: N/A     Occupational History    Not on file.      Social History Main Topics    Smoking status: Current Every Day Smoker     Packs/day: 1.00     Years: 50.00    Smokeless tobacco: Not on file    Alcohol use Yes      Comment: social    Drug use: No    Sexual activity: Not Currently     Partners: Male     Other Topics Concern    Not on file     Social History Narrative         ALLERGIES: Review of patient's allergies indicates no known allergies. Review of Systems   Constitutional: Negative for chills and fever. HENT: Positive for ear pain and sore throat. Respiratory: Positive for shortness of breath. Cardiovascular: Positive for chest pain. Gastrointestinal: Negative for diarrhea, nausea and vomiting. Genitourinary: Negative for vaginal discharge. Neurological: Positive for headaches. Negative for dizziness and numbness. All other systems reviewed and are negative. Vitals:    05/20/17 1611 05/20/17 1612 05/20/17 1613 05/20/17 1619   BP:       Pulse: 99 (!) 106 (!) 108    Resp: 17 17 15    Temp:       SpO2: 99% 99% 98% 98%   Weight:       Height:                Physical Exam   Constitutional: She is oriented to person, place, and time. She appears well-developed and well-nourished. NAD, AxOx4, speaking in complete sentences     HENT:   Head: Normocephalic and atraumatic. Nose: Nose normal.   Mouth/Throat: Oropharynx is clear and moist.   Cn intact     Eyes: Conjunctivae and EOM are normal. Pupils are equal, round, and reactive to light. Right eye exhibits no discharge. Left eye exhibits no discharge. No scleral icterus. Neck: Normal range of motion. Neck supple. No JVD present. No tracheal deviation present. Cardiovascular: Normal heart sounds and intact distal pulses. Exam reveals no gallop and no friction rub. No murmur heard. Pulmonary/Chest: Effort normal and breath sounds normal. No respiratory distress. She has no wheezes. She has no rales. She exhibits no tenderness. Abdominal: Soft. Bowel sounds are normal. There is no tenderness. There is no rebound and no guarding. Genitourinary: No vaginal discharge found.    Genitourinary Comments: Pt denies urinary/ vaginal complaints   Musculoskeletal: Normal range of motion. She exhibits no edema or tenderness. Neurological: She is alert and oriented to person, place, and time. She has normal reflexes. No cranial nerve deficit. She exhibits normal muscle tone. Coordination normal.   pt has motor/ CV/ Sensation grossly intact to all extremities, R = L in strength;   Skin: Skin is warm and dry. No rash noted. No erythema. No pallor. Psychiatric: She has a normal mood and affect. Her behavior is normal. Thought content normal.   Nursing note and vitals reviewed. MDM  Number of Diagnoses or Management Options  Atrial fibrillation with RVR ([address]):   Risk of Complications, Morbidity, and/or Mortality  Presenting problems: high  Diagnostic procedures: moderate  Management options: moderate    Critical Care  Total time providing critical care: 30-74 minutes (35 min  )    ED Course       Procedures    Chief Complaint   Patient presents with    Chest Pain       3:29 PM  The patients presenting problems have been discussed, and they are in agreement with the care plan formulated and outlined with them. I have encouraged them to ask questions as they arise throughout their visit. MEDICATIONS GIVEN:  Medications - No data to display    LABS REVIEWED:  Labs Reviewed - No data to display    RADIOLOGY RESULTS:  The following have been ordered and reviewed:  _____________________________________________________________________  _____________________________________________________________________    EKG interpretation: (Preliminary)  Rhythm: a fib w/ RVR rhythm; and ir-regular . Rate (approx.): 171; Axis: normal; P wave: normal; QRS interval: normal ; ST/T wave: normal; Negative acute significant segmental elevations    PROCEDURES:        CONSULTATIONS:   CONSULT NOTE:  4:33 PM Gael Sheehan MD spoke with Dr. Shae Baron, Consult for Cardiology. Discussed available diagnostic tests and clinical findings.   He is in agreement with care plans as outlined. PROGRESS NOTES:      DIAGNOSIS:    1. Atrial fibrillation with RVR (HCC)        PLAN:  1- fib w/ RVR; rate controlled w/ Dilt/ agrees w/ admission/ further evaluation;       ED COURSE: The patients hospital course has been uncomplicated. 4:09 PM  HR now in 90's/ 'feels much better now'; awaiting results;        4:35 PM  Patient is being evaluated for admission to the hospital by the hospitalist, Dr Noel Green . The results of their tests and reasons for their admission have been discussed with them and/or available family. They convey agreement and understanding for the need to be admitted and for their admission diagnosis. Consultation has been made with the inpatient physician specialist for hospitalization.

## 2017-05-21 ENCOUNTER — APPOINTMENT (OUTPATIENT)
Dept: CT IMAGING | Age: 72
DRG: 310 | End: 2017-05-21
Attending: SPECIALIST
Payer: MEDICARE

## 2017-05-21 LAB
ANION GAP BLD CALC-SCNC: 7 MMOL/L (ref 5–15)
APPEARANCE UR: CLEAR
BACTERIA URNS QL MICRO: NEGATIVE /HPF
BILIRUB UR QL: NEGATIVE
BUN SERPL-MCNC: 15 MG/DL (ref 6–20)
BUN/CREAT SERPL: 20 (ref 12–20)
CALCIUM SERPL-MCNC: 8.3 MG/DL (ref 8.5–10.1)
CHLORIDE SERPL-SCNC: 108 MMOL/L (ref 97–108)
CO2 SERPL-SCNC: 25 MMOL/L (ref 21–32)
COLOR UR: ABNORMAL
CREAT SERPL-MCNC: 0.75 MG/DL (ref 0.55–1.02)
EPITH CASTS URNS QL MICRO: ABNORMAL /LPF
GLUCOSE SERPL-MCNC: 100 MG/DL (ref 65–100)
GLUCOSE UR STRIP.AUTO-MCNC: NEGATIVE MG/DL
HGB UR QL STRIP: ABNORMAL
HYALINE CASTS URNS QL MICRO: ABNORMAL /LPF (ref 0–5)
KETONES UR QL STRIP.AUTO: NEGATIVE MG/DL
LEUKOCYTE ESTERASE UR QL STRIP.AUTO: NEGATIVE
NITRITE UR QL STRIP.AUTO: NEGATIVE
PH UR STRIP: 5.5 [PH] (ref 5–8)
POTASSIUM SERPL-SCNC: 4 MMOL/L (ref 3.5–5.1)
PROT UR STRIP-MCNC: NEGATIVE MG/DL
RBC #/AREA URNS HPF: ABNORMAL /HPF (ref 0–5)
SODIUM SERPL-SCNC: 140 MMOL/L (ref 136–145)
SP GR UR REFRACTOMETRY: 1.02 (ref 1–1.03)
TROPONIN I SERPL-MCNC: <0.04 NG/ML
UROBILINOGEN UR QL STRIP.AUTO: 0.2 EU/DL (ref 0.2–1)
WBC URNS QL MICRO: ABNORMAL /HPF (ref 0–4)

## 2017-05-21 PROCEDURE — 93306 TTE W/DOPPLER COMPLETE: CPT

## 2017-05-21 PROCEDURE — 74011250637 HC RX REV CODE- 250/637: Performed by: SPECIALIST

## 2017-05-21 PROCEDURE — 74011250636 HC RX REV CODE- 250/636: Performed by: SPECIALIST

## 2017-05-21 PROCEDURE — 80048 BASIC METABOLIC PNL TOTAL CA: CPT | Performed by: INTERNAL MEDICINE

## 2017-05-21 PROCEDURE — 36415 COLL VENOUS BLD VENIPUNCTURE: CPT | Performed by: INTERNAL MEDICINE

## 2017-05-21 PROCEDURE — 81001 URINALYSIS AUTO W/SCOPE: CPT | Performed by: EMERGENCY MEDICINE

## 2017-05-21 PROCEDURE — 74011250636 HC RX REV CODE- 250/636: Performed by: INTERNAL MEDICINE

## 2017-05-21 PROCEDURE — 74011250637 HC RX REV CODE- 250/637: Performed by: INTERNAL MEDICINE

## 2017-05-21 PROCEDURE — 65660000000 HC RM CCU STEPDOWN

## 2017-05-21 RX ORDER — FUROSEMIDE 20 MG/1
20 TABLET ORAL DAILY
Status: DISCONTINUED | OUTPATIENT
Start: 2017-05-22 | End: 2017-05-21

## 2017-05-21 RX ORDER — ALPRAZOLAM 0.25 MG/1
0.25 TABLET ORAL
Status: DISCONTINUED | OUTPATIENT
Start: 2017-05-21 | End: 2017-05-22 | Stop reason: HOSPADM

## 2017-05-21 RX ORDER — METOPROLOL TARTRATE 50 MG/1
50 TABLET ORAL 2 TIMES DAILY
Status: DISCONTINUED | OUTPATIENT
Start: 2017-05-21 | End: 2017-05-22 | Stop reason: HOSPADM

## 2017-05-21 RX ORDER — ONDANSETRON 2 MG/ML
4 INJECTION INTRAMUSCULAR; INTRAVENOUS
Status: DISCONTINUED | OUTPATIENT
Start: 2017-05-21 | End: 2017-05-22 | Stop reason: HOSPADM

## 2017-05-21 RX ADMIN — METOPROLOL TARTRATE 50 MG: 50 TABLET, FILM COATED ORAL at 18:28

## 2017-05-21 RX ADMIN — LEVOTHYROXINE SODIUM 88 MCG: 88 TABLET ORAL at 08:30

## 2017-05-21 RX ADMIN — Medication 10 ML: at 14:00

## 2017-05-21 RX ADMIN — CLINDAMYCIN HYDROCHLORIDE 150 MG: 150 CAPSULE ORAL at 08:29

## 2017-05-21 RX ADMIN — ENOXAPARIN SODIUM 30 MG: 30 INJECTION SUBCUTANEOUS at 08:29

## 2017-05-21 RX ADMIN — CLINDAMYCIN HYDROCHLORIDE 150 MG: 150 CAPSULE ORAL at 01:13

## 2017-05-21 RX ADMIN — HYDROCODONE BITARTRATE AND ACETAMINOPHEN 1 TABLET: 5; 325 TABLET ORAL at 01:13

## 2017-05-21 RX ADMIN — Medication 10 ML: at 21:42

## 2017-05-21 RX ADMIN — HYDROCODONE BITARTRATE AND ACETAMINOPHEN 1 TABLET: 5; 325 TABLET ORAL at 16:32

## 2017-05-21 RX ADMIN — Medication 10 ML: at 07:20

## 2017-05-21 RX ADMIN — ENOXAPARIN SODIUM 40 MG: 40 INJECTION SUBCUTANEOUS at 08:29

## 2017-05-21 RX ADMIN — HYDROCODONE BITARTRATE AND ACETAMINOPHEN 1 TABLET: 5; 325 TABLET ORAL at 12:21

## 2017-05-21 RX ADMIN — CLINDAMYCIN HYDROCHLORIDE 150 MG: 150 CAPSULE ORAL at 16:23

## 2017-05-21 RX ADMIN — CLINDAMYCIN HYDROCHLORIDE 150 MG: 150 CAPSULE ORAL at 21:42

## 2017-05-21 RX ADMIN — ASPIRIN 81 MG CHEWABLE TABLET 81 MG: 81 TABLET CHEWABLE at 08:29

## 2017-05-21 RX ADMIN — SODIUM CHLORIDE 500 ML: 900 INJECTION, SOLUTION INTRAVENOUS at 03:26

## 2017-05-21 RX ADMIN — METOPROLOL TARTRATE 25 MG: 25 TABLET ORAL at 07:20

## 2017-05-21 RX ADMIN — RIVAROXABAN 20 MG: 10 TABLET, FILM COATED ORAL at 18:27

## 2017-05-21 NOTE — PROGRESS NOTES
Yusuf Hannon MD [facility] [address]   Office [phone]  Mobile [phone]            NAME:  Nikia Sykes   :   1945   MRN:   [id number]     Assessment/Plan:   1. Recurrent AF 2 years post ablation. Rate okay. 2. Intermittent jaw,chest pain. Troponins negative. Pleuritic component. Consider Chest CTA. Stress nuclear tomorrow if negative  3. MARY - doubt this is HF. Elevated BNP due to AF. 4. Anxiety     Continue metoprolol but change dose/frequency to bid  Replace lovenox with Xarelto  Chest CTA today? Stress nuclear study if negative    Would not start diuretic particularly given low BPs      Subjective:   Cardiac ROS: recurrent chest/jaw pain at rest, pleuritic at times. . Patient denies any  palpitations, syncope, orthopnea, edema or paroxysmal nocturnal dyspnea. Review of Systems: No nausea, indigestion, vomiting, pain, cough, sputum. No bleeding. Taking po. Appetite fair      Objective:     Visit Vitals    /60    Pulse 80    Temp 97.9 °F (36.6 °C)    Resp 15    Ht 5' 5\" (1.651 m)    Wt 154 lb 4.8 oz (70 kg)    SpO2 97%    Breastfeeding No    BMI 25.68 kg/m2      O2 Device: Room air    Temp (24hrs), Av.1 °F (36.7 °C), Min:97.5 °F (36.4 °C), Max:98.5 °F (36.9 °C)            1901 -  0700  In: 240 [P.O.:240]  Out: 400 [Urine:400]    TELE:   General: AAOx3 cooperative, no acute distress. HEENT: Atraumatic. Pink and moist.  Anicteric sclerae. Neck : Supple, no thyromegaly. Lungs: CTA bilaterally. No wheezing/rhonchi/rales. Heart: Irregular rhythm, no murmur, no rubs, no gallops. No JVD. No carotid bruits. Abdomen: Soft, non-distended, non-tender. + Bowel sounds. No bruits. Extremities: No edema, no clubbing, no cyanosis. No calf tenderness  Neurologic: Grossly intact. Alert and oriented X 3. No acute neurological distress. Psych: Good insight. Not anxious nor agitated.     Additional comments: None    Care Plan discussed with:    Comments   Patient x    Family      RN     Care Manager                    Consultant:  x        Data Review:     EKG    Echo    No results for input(s): TNIPOC in the last 72 hours.     No lab exists for component: ITNL   Recent Labs      05/20/17   2326  05/20/17   1734  05/20/17   1540   CPK   --    --   40   CKMB   --    --   <1.0   TROIQ  <0.04  <0.04  <0.04     Recent Labs      05/21/17   0444  05/20/17   1540   NA  140  139   K  4.0  3.6   CL  108  104   CO2  25  27   BUN  15  19   CREA  0.75  0.95   GLU  100  120*   ALB   --   3.5   WBC   --   10.5   HGB   --   14.4   HCT   --   43.1   PLT   --   295     Recent Labs      05/20/17   1540   INR  1.0   PTP  10.0       Medications reviewed  Current Facility-Administered Medications   Medication Dose Route Frequency    ondansetron (ZOFRAN) injection 4 mg  4 mg IntraVENous Q6H PRN    [START ON 5/22/2017] furosemide (LASIX) tablet 20 mg  20 mg Oral DAILY    ALPRAZolam (XANAX) tablet 0.25 mg  0.25 mg Oral QID PRN    dilTIAZem (CARDIZEM) 125 mg in dextrose 5% 125 mL infusion  0-15 mg/hr IntraVENous TITRATE    sodium chloride (NS) flush 5-10 mL  5-10 mL IntraVENous Q8H    0.9% sodium chloride infusion  75 mL/hr IntraVENous CONTINUOUS    aspirin chewable tablet 81 mg  81 mg Oral DAILY    acetaminophen (TYLENOL) tablet 650 mg  650 mg Oral Q4H PRN    HYDROcodone-acetaminophen (NORCO) 5-325 mg per tablet 1 Tab  1 Tab Oral Q4H PRN    sodium chloride (NS) flush 5-10 mL  5-10 mL IntraVENous PRN    metoprolol tartrate (LOPRESSOR) tablet 25 mg  25 mg Oral Q6H    enoxaparin (LOVENOX) injection 30 mg  30 mg SubCUTAneous Q12H    And    enoxaparin (LOVENOX) injection 40 mg  40 mg SubCUTAneous Q12H    clindamycin (CLEOCIN) capsule 150 mg  150 mg Oral Q6H    levothyroxine (SYNTHROID) tablet 88 mcg  88 mcg Oral ACB         Jaci Forte MD

## 2017-05-21 NOTE — PROGRESS NOTES
Primary Nurse Franca Brady RN and Nicol Blackwood RN performed a dual skin assessment on this patient No impairment noted  Shola score is 23

## 2017-05-21 NOTE — PROGRESS NOTES
TRANSFER - IN REPORT:    Verbal report received from Familia Brooke (name) on Lew Mcgowan  being received from ED (unit) for routine progression of care      Report consisted of patients Situation, Background, Assessment and   Recommendations(SBAR). Information from the following report(s) SBAR, Kardex, MAR and Med Rec Status was reviewed with the receiving nurse. Opportunity for questions and clarification was provided. Assessment completed upon patients arrival to unit and care assumed. Patient voided  X 2 and missed the hat, unable to get accurate I/O's.    0320  Spoke to Dr. Tasneem Hatch about patient's blood pressure, running in 92'X systolic with map trending lower 60-64. Order given for 500ml bolus. Diltiazem already discontinued. Will continue to monitor. 0730  Bedside and Verbal shift change report given to [employer] (oncoming nurse) by Soraya Chacko (offgoing nurse). Report included the following information SBAR, Kardex, MAR and Recent Results.

## 2017-05-21 NOTE — ED NOTES
TRANSFER - OUT REPORT:    Verbal report given to Su DELACRUZ(name) on Luana Sanchez  being transferred to [place] [id number](unit) for routine progression of care       Report consisted of patients Situation, Background, Assessment and   Recommendations(SBAR). Information from the following report(s) SBAR, Kardex, ED Summary and Med Rec Status was reviewed with the receiving nurse. Lines:   Peripheral IV 05/20/17 Right Antecubital (Active)   Site Assessment Clean, dry, & intact 5/20/2017  3:39 PM   Phlebitis Assessment 0 5/20/2017  3:39 PM   Infiltration Assessment 0 5/20/2017  3:39 PM   Dressing Status Clean, dry, & intact 5/20/2017  3:39 PM   Dressing Type Tape;Transparent 5/20/2017  3:39 PM   Hub Color/Line Status Pink 5/20/2017  3:39 PM       Peripheral IV 05/20/17 Right Wrist (Active)   Site Assessment Clean, dry, & intact 5/20/2017  6:20 PM   Phlebitis Assessment 0 5/20/2017  6:20 PM   Infiltration Assessment 0 5/20/2017  6:20 PM   Dressing Status Clean, dry, & intact 5/20/2017  6:20 PM   Dressing Type Tape;Transparent 5/20/2017  6:20 PM   Hub Color/Line Status Blue 5/20/2017  6:20 PM        Opportunity for questions and clarification was provided.       Patient transported with:   Monitor  Tech

## 2017-05-21 NOTE — PROGRESS NOTES
Dre Purdy [facility]  [address] [facility], [place], [address] [place] [address]  [phone]      Medical Progress Note      NAME: Grant Mix   :  1945  MRM:  [id number]    Date/Time: 2017  11:21 AM         Subjective:     Chief Complaint: \"I'm okay\"     No complaints this AM. Off dilt gtt. No complaints currently. Denies CP but does feel SOB. ROS:  (bold if positive, if negative)      SOB        Objective:       Vitals:          Last 24hrs VS reviewed since prior progress note. Most recent are:    Visit Vitals    /77    Pulse 71    Temp 98.3 °F (36.8 °C)    Resp 16    Ht 5' 5\" (1.651 m)    Wt 70 kg (154 lb 4.8 oz)    SpO2 97%    Breastfeeding No    BMI 25.68 kg/m2     SpO2 Readings from Last 6 Encounters:   17 97%   17 100%   16 94%   09/25/15 95%   14 100%   14 93%          Intake/Output Summary (Last 24 hours) at 17 1121  Last data filed at 17 0515   Gross per 24 hour   Intake              240 ml   Output              400 ml   Net             -160 ml          Exam:     Physical Exam:    Gen:  Well-developed, well-nourished, in no acute distress  HEENT:  Pink conjunctivae, PERRL, hearing intact to voice, moist mucous membranes  Neck:  Supple, without masses, thyroid non-tender  Resp:  Bibasilar crackles. Card:  HR in the 90's.  No murmurs, normal S1, S2 without thrills, bruits or peripheral edema  Abd:  Soft, non-tender, non-distended, normoactive bowel sounds are present  Musc:  No cyanosis or clubbing  Skin:  No rashes or ulcers, skin turgor is good  Neuro:  Cranial nerves 3-12 are grossly intact,  strength is 5/5 bilaterally and dorsi / plantarflexion is 5/5 bilaterally, follows commands appropriately  Psych:  Good insight, oriented to person, place and time, alert    Medications Reviewed: (see below)    Lab Data Reviewed: (see below)    ______________________________________________________________________    Medications: Current Facility-Administered Medications   Medication Dose Route Frequency    ondansetron (ZOFRAN) injection 4 mg  4 mg IntraVENous Q6H PRN    dilTIAZem (CARDIZEM) 125 mg in dextrose 5% 125 mL infusion  0-15 mg/hr IntraVENous TITRATE    sodium chloride (NS) flush 5-10 mL  5-10 mL IntraVENous Q8H    0.9% sodium chloride infusion  75 mL/hr IntraVENous CONTINUOUS    aspirin chewable tablet 81 mg  81 mg Oral DAILY    acetaminophen (TYLENOL) tablet 650 mg  650 mg Oral Q4H PRN    HYDROcodone-acetaminophen (NORCO) 5-325 mg per tablet 1 Tab  1 Tab Oral Q4H PRN    sodium chloride (NS) flush 5-10 mL  5-10 mL IntraVENous PRN    metoprolol tartrate (LOPRESSOR) tablet 25 mg  25 mg Oral Q6H    enoxaparin (LOVENOX) injection 30 mg  30 mg SubCUTAneous Q12H    And    enoxaparin (LOVENOX) injection 40 mg  40 mg SubCUTAneous Q12H    clindamycin (CLEOCIN) capsule 150 mg  150 mg Oral Q6H    levothyroxine (SYNTHROID) tablet 88 mcg  88 mcg Oral ACB            Lab Review:     Recent Labs      05/20/17   1540   WBC  10.5   HGB  14.4   HCT  43.1   PLT  295     Recent Labs      05/21/17   0444  05/20/17   1540   NA  140  139   K  4.0  3.6   CL  108  104   CO2  25  27   GLU  100  120*   BUN  15  19   CREA  0.75  0.95   CA  8.3*  8.8   ALB   --   3.5   SGOT   --   11*   ALT   --   12   INR   --   1.0     No components found for: [place]         Assessment / Plan:     Atrial fibrillation with rapid ventricular response (HCC) (5/20/2017)/    S/P ablation of atrial fibrillation (8/15/2016)  -off dilt gtt  -Mg>2, K, 4  -check TFT's   -TTE pending   -continue metoprolol and therapeutic Lovenox   -cardiology on board   -add GENTLE diuresis; proBNP > 3000 and mild bibasilar crackles with subjective SOB       Hashimoto's thyroiditis (8/15/2016)  -continue levothyroxine   -TFT's pending       Dental infection (5/20/2017)  -on clind  -scheduled to see the periodontist on 5/22; may need to reschedule     Total time spent with patient: 30 Minutes                  Care Plan discussed with: Patient, Family and Nursing Staff    Discussed:  Code Status, Care Plan and D/C Planning    Prophylaxis:  Lovenox    Disposition:  Home w/Family           ___________________________________________________    Attending Physician: Dorothea Mendoza MD

## 2017-05-21 NOTE — PROGRESS NOTES
Pt refused to get Chest CTA ordered by Dr. Boaz Kendrick . Demanded reasons for the test.\" I will leave if doctors not to talk to me\". Per Dr. Boaz Kendrick chest CTA can be cancelled .

## 2017-05-21 NOTE — PROGRESS NOTES
Pt upset about the CTA and stating she will leave AMA. Called pt to discuss. First, pt accused me of not seeing her and she doesn't recall even meeting me. I proceeded but reiterating what we had discussed about the need for TTE, the \"plumbing\" and \"electrical\" systems of her heart. I also explained that she had told me about her dental pain and her peridontal appt on 1PM. I asked her how I would know that information without having seen her this AM. She then stated she wanted and MRI. I asked her to clarify what we would be looking at on this MRI. She felt that her teeth needed to be evaluated. I explained that both the dentist and peridontist would be evaluating her teeth but that we are concerned about the jaw pain radiating to the chest as well as the a-fib with RVR. A stress is to be completed tomorrow but unclear if pt will stay for this test. Unclear why she does not recall prior conversations. ?dementia ?medication though did not appear at all sedated during our conversation early ?did not realize I was a physician despite the white coat and introductions.

## 2017-05-22 ENCOUNTER — APPOINTMENT (OUTPATIENT)
Dept: NUCLEAR MEDICINE | Age: 72
DRG: 310 | End: 2017-05-22
Attending: NURSE PRACTITIONER
Payer: MEDICARE

## 2017-05-22 VITALS
BODY MASS INDEX: 25.49 KG/M2 | RESPIRATION RATE: 20 BRPM | HEART RATE: 94 BPM | HEIGHT: 65 IN | TEMPERATURE: 97.6 F | WEIGHT: 153 LBS | OXYGEN SATURATION: 97 % | SYSTOLIC BLOOD PRESSURE: 101 MMHG | DIASTOLIC BLOOD PRESSURE: 60 MMHG

## 2017-05-22 LAB
ANION GAP BLD CALC-SCNC: 5 MMOL/L (ref 5–15)
ATRIAL RATE: 107 BPM
ATRIAL RATE: 182 BPM
ATTENDING PHYSICIAN, CST07: NORMAL
BASOPHILS # BLD AUTO: 0 K/UL (ref 0–0.1)
BASOPHILS # BLD: 1 % (ref 0–1)
BUN SERPL-MCNC: 14 MG/DL (ref 6–20)
BUN/CREAT SERPL: 19 (ref 12–20)
CALCIUM SERPL-MCNC: 8.6 MG/DL (ref 8.5–10.1)
CALCULATED R AXIS, ECG10: 12 DEGREES
CALCULATED R AXIS, ECG10: 9 DEGREES
CALCULATED T AXIS, ECG11: -174 DEGREES
CALCULATED T AXIS, ECG11: 34 DEGREES
CHLORIDE SERPL-SCNC: 107 MMOL/L (ref 97–108)
CO2 SERPL-SCNC: 28 MMOL/L (ref 21–32)
CREAT SERPL-MCNC: 0.74 MG/DL (ref 0.55–1.02)
DIAGNOSIS, 93000: NORMAL
DUKE TM SCORE RESULT, CST14: NORMAL
DUKE TREADMILL SCORE, CST13: NORMAL
ECG INTERP BEFORE EX, CST11: NORMAL
ECG INTERP DURING EX, CST12: NORMAL
EOSINOPHIL # BLD: 0.2 K/UL (ref 0–0.4)
EOSINOPHIL NFR BLD: 5 % (ref 0–7)
ERYTHROCYTE [DISTWIDTH] IN BLOOD BY AUTOMATED COUNT: 14.8 % (ref 11.5–14.5)
FUNCTIONAL CAPACITY, CST17: NORMAL
GLUCOSE SERPL-MCNC: 96 MG/DL (ref 65–100)
HCT VFR BLD AUTO: 37.2 % (ref 35–47)
HGB BLD-MCNC: 12.9 G/DL (ref 11.5–16)
KNOWN CARDIAC CONDITION, CST08: NORMAL
LYMPHOCYTES # BLD AUTO: 32 % (ref 12–49)
LYMPHOCYTES # BLD: 1.4 K/UL (ref 0.8–3.5)
MAGNESIUM SERPL-MCNC: 1.9 MG/DL (ref 1.6–2.4)
MAX. DIASTOLIC BP, CST04: 92 MMHG
MAX. HEART RATE, CST05: 164 BPM
MAX. SYSTOLIC BP, CST03: 138 MMHG
MCH RBC QN AUTO: 31.5 PG (ref 26–34)
MCHC RBC AUTO-ENTMCNC: 34.7 G/DL (ref 30–36.5)
MCV RBC AUTO: 91 FL (ref 80–99)
MONOCYTES # BLD: 0.4 K/UL (ref 0–1)
MONOCYTES NFR BLD AUTO: 10 % (ref 5–13)
NEUTS SEG # BLD: 2.2 K/UL (ref 1.8–8)
NEUTS SEG NFR BLD AUTO: 52 % (ref 32–75)
OVERALL BP RESPONSE TO EXERCISE, CST16: NORMAL
OVERALL HR RESPONSE TO EXERCISE, CST15: NORMAL
PEAK EX METS, CST10: 1 METS
PLATELET # BLD AUTO: 242 K/UL (ref 150–400)
POTASSIUM SERPL-SCNC: 3.9 MMOL/L (ref 3.5–5.1)
PROTOCOL NAME, CST01: NORMAL
Q-T INTERVAL, ECG07: 272 MS
Q-T INTERVAL, ECG07: 352 MS
QRS DURATION, ECG06: 76 MS
QRS DURATION, ECG06: 80 MS
QTC CALCULATION (BEZET), ECG08: 458 MS
QTC CALCULATION (BEZET), ECG08: 474 MS
RBC # BLD AUTO: 4.09 M/UL (ref 3.8–5.2)
SODIUM SERPL-SCNC: 140 MMOL/L (ref 136–145)
TEST INDICATION, CST09: NORMAL
VENTRICULAR RATE, ECG03: 109 BPM
VENTRICULAR RATE, ECG03: 171 BPM
WBC # BLD AUTO: 4.3 K/UL (ref 3.6–11)

## 2017-05-22 PROCEDURE — 93017 CV STRESS TEST TRACING ONLY: CPT

## 2017-05-22 PROCEDURE — 74011250637 HC RX REV CODE- 250/637: Performed by: SPECIALIST

## 2017-05-22 PROCEDURE — 74011250636 HC RX REV CODE- 250/636: Performed by: SPECIALIST

## 2017-05-22 PROCEDURE — 83735 ASSAY OF MAGNESIUM: CPT | Performed by: INTERNAL MEDICINE

## 2017-05-22 PROCEDURE — 74011250637 HC RX REV CODE- 250/637: Performed by: INTERNAL MEDICINE

## 2017-05-22 PROCEDURE — 80048 BASIC METABOLIC PNL TOTAL CA: CPT | Performed by: INTERNAL MEDICINE

## 2017-05-22 PROCEDURE — 36415 COLL VENOUS BLD VENIPUNCTURE: CPT | Performed by: INTERNAL MEDICINE

## 2017-05-22 PROCEDURE — A9500 TC99M SESTAMIBI: HCPCS

## 2017-05-22 PROCEDURE — 85025 COMPLETE CBC W/AUTO DIFF WBC: CPT | Performed by: INTERNAL MEDICINE

## 2017-05-22 RX ORDER — METOPROLOL TARTRATE 50 MG/1
50 TABLET ORAL 2 TIMES DAILY
Qty: 60 TAB | Refills: 0 | Status: SHIPPED | OUTPATIENT
Start: 2017-05-22 | End: 2017-06-05 | Stop reason: SDUPTHER

## 2017-05-22 RX ORDER — HYDROCODONE BITARTRATE AND ACETAMINOPHEN 5; 325 MG/1; MG/1
1 TABLET ORAL
Qty: 12 TAB | Refills: 0 | Status: SHIPPED | OUTPATIENT
Start: 2017-05-22 | End: 2017-06-05

## 2017-05-22 RX ADMIN — Medication 10 ML: at 04:20

## 2017-05-22 RX ADMIN — ASPIRIN 81 MG CHEWABLE TABLET 81 MG: 81 TABLET CHEWABLE at 12:00

## 2017-05-22 RX ADMIN — REGADENOSON 0.4 MG: 0.08 INJECTION, SOLUTION INTRAVENOUS at 10:24

## 2017-05-22 RX ADMIN — METOPROLOL TARTRATE 50 MG: 50 TABLET, FILM COATED ORAL at 11:59

## 2017-05-22 RX ADMIN — HYDROCODONE BITARTRATE AND ACETAMINOPHEN 1 TABLET: 5; 325 TABLET ORAL at 02:06

## 2017-05-22 RX ADMIN — HYDROCODONE BITARTRATE AND ACETAMINOPHEN 1 TABLET: 5; 325 TABLET ORAL at 06:06

## 2017-05-22 RX ADMIN — CLINDAMYCIN HYDROCHLORIDE 150 MG: 150 CAPSULE ORAL at 01:56

## 2017-05-22 NOTE — PROGRESS NOTES
SHIFT CHANGE:  1930 Bedside and Verbal shift change report given to Su DELACRUZ (oncoming nurse) by Moises Luis (offgoing nurse). Report included the following information SBAR, Kardex, MAR and Recent Results. SHIFT SUMMARY:  2015  CT tech called to see if patient was able to come for test, patient still refusing at this time. CT tech to cancel test.    END OF SHIFT REPORT:  2300  Bedside and Verbal shift change report given to minna  (oncoming nurse) by Troy Davis (offgoing nurse). Report included the following information SBAR, Kardex, MAR and Recent Results.

## 2017-05-22 NOTE — PROGRESS NOTES
Cardiology Progress Note [facility]          NAME:  Luana Sanchez   :   1945   MRN:   [id number]     Assessment/Plan:   1. Recurrent AF 2 years post ablation. Rate okay on BB. Milena. Recently saw [facility] doc to establish cardiac care since moving to [place] from [place].   2. Intermittent jaw,chest pain. Troponins negative. Pleuritic component. Stress nuclear today. 3. MARY - doubt this is HF. Elevated BNP due to AF. 4. Anxiety:    12:40 pm  addendum:  Stress test negative for ischemia. LVEF nml. Prefers to follow up with Dr Bárbara Loaiza. Will see in a couple weeks in the office and if remains in a fib will pursue DCCV. Cont BB, Xarelto. [id number] Alecia Pandey for d/c cardiac wise. Cardiology attending:  Pt seen and examined. Persistent AF, rate controlled  No ischemia, normal EF but moderate LAE  Home today    Natalie Sahni MD    Subjective:   Cardiac ROS: recurrent chest/jaw pain at rest.  Remains in a fib. Patient denies any  palpitations, syncope, orthopnea, edema or paroxysmal nocturnal dyspnea. Review of Systems: No nausea, indigestion, vomiting, pain, cough, sputum. No bleeding. NPO for stress test.       Objective:     Visit Vitals    /60 (BP 1 Location: Left arm, BP Patient Position: At rest)    Pulse 94    Temp 97.6 °F (36.4 °C)    Resp 20    Ht 5' 5\" (1.651 m)    Wt 153 lb (69.4 kg)    SpO2 97%    Breastfeeding No    BMI 25.46 kg/m2      O2 Device: Room air    Temp (24hrs), Av.9 °F (36.6 °C), Min:97.6 °F (36.4 °C), Max:98.1 °F (36.7 °C)            1901 -  0700  In: 0   Out: 600 [Urine:600]    TELE: AF 90's to 100      General: AAOx3 cooperative, no acute distress. HEENT: Atraumatic. Pink and moist.  Anicteric sclerae. Neck : Supple, no thyromegaly. Lungs: CTA bilaterally. No wheezing/rhonchi/rales. Heart: Irregular rhythm, no murmur, no rubs, no gallops. No JVD. No carotid bruits. Abdomen: Soft, non-distended, non-tender. + Bowel sounds.  No bruits. Extremities: No edema, no clubbing, no cyanosis. No calf tenderness  Neurologic: Grossly intact. Alert and oriented X 3. No acute neurological distress. Psych: Good insight. Not anxious or agitated. Care Plan discussed with:    Comments   Patient x    Family      RN x    Care Manager                    Consultant:  frank attending       Data Review:     EKG    Echo    No results for input(s): TNIPOC in the last 72 hours.     No lab exists for component: ITNL   Recent Labs      05/20/17   2326  05/20/17   1734  05/20/17   1540   CPK   --    --   40   CKMB   --    --   <1.0   TROIQ  <0.04  <0.04  <0.04     Recent Labs      05/22/17   0159  05/21/17   0444  05/20/17   1540   NA  140  140  139   K  3.9  4.0  3.6   CL  107  108  104   CO2  28  25  27   BUN  14  15  19   CREA  0.74  0.75  0.95   GLU  96  100  120*   MG  1.9   --    --    ALB   --    --   3.5   WBC  4.3   --   10.5   HGB  12.9   --   14.4   HCT  37.2   --   43.1   PLT  242   --   295     Recent Labs      05/20/17   1540   INR  1.0   PTP  10.0       Medications reviewed  Current Facility-Administered Medications   Medication Dose Route Frequency    [COMPLETED] technetium sestamibi (CARDIOLITE) injection 11 millicurie  11 millicurie IntraVENous RAD ONCE    ondansetron (ZOFRAN) injection 4 mg  4 mg IntraVENous Q6H PRN    ALPRAZolam (XANAX) tablet 0.25 mg  0.25 mg Oral QID PRN    rivaroxaban (XARELTO) tablet 20 mg  20 mg Oral DAILY WITH DINNER    metoprolol tartrate (LOPRESSOR) tablet 50 mg  50 mg Oral BID    sodium chloride (NS) flush 5-10 mL  5-10 mL IntraVENous Q8H    aspirin chewable tablet 81 mg  81 mg Oral DAILY    acetaminophen (TYLENOL) tablet 650 mg  650 mg Oral Q4H PRN    HYDROcodone-acetaminophen (NORCO) 5-325 mg per tablet 1 Tab  1 Tab Oral Q4H PRN    sodium chloride (NS) flush 5-10 mL  5-10 mL IntraVENous PRN    clindamycin (CLEOCIN) capsule 150 mg  150 mg Oral Q6H    levothyroxine (SYNTHROID) tablet 88 mcg  88 mcg Oral ACB         Romayne Plane, NP

## 2017-05-22 NOTE — PROGRESS NOTES
Bedside and Verbal shift change report given to Sabas Williamson RN (oncoming nurse) by Ted Zhou RN (offgoing nurse). Report included the following information SBAR and Kardex.

## 2017-05-22 NOTE — DISCHARGE INSTRUCTIONS
Patient Discharge Instructions    Lennox Hicks / [id number] : 1945    Admitted 2017 Discharged: 2017     Primary Diagnoses  Problem List as of 2017  Date Reviewed: 2017          Codes Class Noted - Resolved    Atrial fibrillation with rapid ventricular response ([facility] ICD-10-CM: I48.91  ICD-9-CM: 427.31  2017 - Present        Dental infection ICD-10-CM: K04.7  ICD-9-CM: 522.4  2017 - Present        Calculus of gallbladder with chronic cholecystitis without obstruction ICD-10-CM: K80.10  ICD-9-CM: 574.10  8/15/2016 - Present        S/P ablation of atrial fibrillation ICD-10-CM: Z98.890, Z86.79  ICD-9-CM: V45.89  8/15/2016 - Present        Hashimoto's thyroiditis ICD-10-CM: E06.3  ICD-9-CM: 245.2  8/15/2016 - Present        Irritable bowel syndrome with diarrhea ICD-10-CM: K58.0  ICD-9-CM: 564.1  8/15/2016 - Present        Paroxysmal a-fib ([facility] ICD-10-CM: I48.0  ICD-9-CM: 427.31  2015 - Present        RESOLVED: Cataract ICD-10-CM: H26.9  ICD-9-CM: 366.9  2014 - 2014              Take Home Medications     · It is important that you take the medication exactly as they are prescribed. · Keep your medication in the bottles provided by the pharmacist and keep a list of the medication names, dosages, and times to be taken in your wallet. · Do not take other medications without consulting your doctor. What to do at Home    Recommended diet: Cardiac Diet    Recommended activity: Activity as tolerated    If you experience sudden onset chest pain, shortness of breath that doesn't improve with rest, please follow up with nearest ER. Follow-up with your Dr. Lynda Philip as scheduled      Information obtained by :  I understand that if any problems occur once I am at home I am to contact my physician. I understand and acknowledge receipt of the instructions indicated above. Physician's or R.N.'s Signature                                                                  Date/Time                                                                                                                                              Patient or Representative Signature                                                          Date/Time       Atrial Fibrillation: Care Instructions  Your Care Instructions    Atrial fibrillation is an irregular and often fast heartbeat. Treating this condition is important for several reasons. It can cause blood clots, which can travel from your heart to your brain and cause a stroke. If you have a fast heartbeat, you may feel lightheaded, dizzy, and weak. An irregular heartbeat can also increase your risk for heart failure. Atrial fibrillation is often the result of another heart condition, such as high blood pressure or coronary artery disease. Making changes to improve your heart condition will help you stay healthy and active. Follow-up care is a key part of your treatment and safety. Be sure to make and go to all appointments, and call your doctor if you are having problems. It's also a good idea to know your test results and keep a list of the medicines you take. How can you care for yourself at home? Medicines  · Take your medicines exactly as prescribed. Call your doctor if you think you are having a problem with your medicine. You will get more details on the specific medicines your doctor prescribes. · If your doctor has given you a blood thinner to prevent a stroke, be sure you get instructions about how to take your medicine safely. Blood thinners can cause serious bleeding problems. · Do not take any vitamins, over-the-counter drugs, or herbal products without talking to your doctor first.  Lifestyle changes  · Do not smoke. Smoking can increase your chance of a stroke and heart attack.  If you need help quitting, talk to your doctor about stop-smoking programs and medicines. These can increase your chances of quitting for good. · Eat a heart-healthy diet. · Stay at a healthy weight. Lose weight if you need to. · Limit alcohol to 2 drinks a day for men and 1 drink a day for women. Too much alcohol can cause health problems. · Avoid colds and flu. Get a pneumococcal vaccine shot. If you have had one before, ask your doctor whether you need another dose. Get a flu shot every year. If you must be around people with colds or flu, wash your hands often. Activity  · If your doctor recommends it, get more exercise. Walking is a good choice. Bit by bit, increase the amount you walk every day. Try for at least 30 minutes on most days of the week. You also may want to swim, bike, or do other activities. Your doctor may suggest that you join a cardiac rehabilitation program so that you can have help increasing your physical activity safely. · Start light exercise if your doctor says it is okay. Even a small amount will help you get stronger, have more energy, and manage stress. Walking is an easy way to get exercise. Start out by walking a little more than you did in the hospital. Gradually increase the amount you walk. · When you exercise, watch for signs that your heart is working too hard. You are pushing too hard if you cannot talk while you are exercising. If you become short of breath or dizzy or have chest pain, sit down and rest immediately. · Check your pulse regularly. Place two fingers on the artery at the palm side of your wrist, in line with your thumb. If your heartbeat seems uneven or fast, talk to your doctor. When should you call for help? Call 911 anytime you think you may need emergency care. For example, call if:  · You have symptoms of a heart attack. These may include:  ¨ Chest pain or pressure, or a strange feeling in the chest.  ¨ Sweating. ¨ Shortness of breath.   ¨ Nausea or vomiting. ¨ Pain, pressure, or a strange feeling in the back, neck, jaw, or upper belly or in one or both shoulders or arms. ¨ Lightheadedness or sudden weakness. ¨ A fast or irregular heartbeat. After you call 911, the  may tell you to chew 1 adult-strength or 2 to 4 low-dose aspirin. Wait for an ambulance. Do not try to drive yourself. · You have symptoms of a stroke. These may include:  ¨ Sudden numbness, tingling, weakness, or loss of movement in your face, arm, or leg, especially on only one side of your body. ¨ Sudden vision changes. ¨ Sudden trouble speaking. ¨ Sudden confusion or trouble understanding simple statements. ¨ Sudden problems with walking or balance. ¨ A sudden, severe headache that is different from past headaches. · You passed out (lost consciousness). Call your doctor now or seek immediate medical care if:  · You have new or increased shortness of breath. · You feel dizzy or lightheaded, or you feel like you may faint. · Your heart rate becomes irregular. · You can feel your heart flutter in your chest or skip heartbeats. Tell your doctor if these symptoms are new or worse. Watch closely for changes in your health, and be sure to contact your doctor if you have any problems. Where can you learn more? Go to http://martínez-renny.info/. Enter U020 in the search box to learn more about \"Atrial Fibrillation: Care Instructions. \"  Current as of: January 27, 2016  Content Version: 11.2  © 6997-6088 [employer]. Care instructions adapted under license by "[employer] (which disclaims liability or warranty for this information). If you have questions about a medical condition or this instruction, always ask your healthcare professional. [place] [address] any warranty or liability for your use of this information. Learning About Atrial Fibrillation  What is atrial fibrillation?   Atrial fibrillation (say \"AY-tree-jesus dxm-nkgd-RPD-shun\") is the most common type of irregular heartbeat (arrhythmia). Normally, the heart beats in a strong, steady rhythm. In atrial fibrillation, a problem with the heart's electrical system causes the two upper parts of the heart (the atria) to quiver, or fibrillate. Your heart rate also may be faster than normal.  Atrial fibrillation can be dangerous because if the heartbeat isn't strong and steady, blood can collect, or pool, in the atria. And pooled blood is more likely to form clots. Clots can travel to the brain, block blood flow, and cause a stroke. Atrial fibrillation can also lead to heart failure. Treatment for atrial fibrillation helps prevent stroke and heart failure. It also helps relieve symptoms. Atrial fibrillation is often caused by another heart problem. It may happen after heart surgery. It may also be caused by other problems, such as an overactive thyroid gland or lung disease. Many people with atrial fibrillation are able to live full and active lives. What are the symptoms? Some people feel symptoms when they have episodes of atrial fibrillation. But other people don't notice any symptoms. If you have symptoms, you may feel:  · A fluttering, racing, or pounding feeling in your chest called palpitations. · Weak or tired. · Dizzy or lightheaded. · Short of breath. · Chest pain. · Confused. You may notice signs of atrial fibrillation when you check your pulse. Your pulse may seem uneven or fast.  What can you expect when you have atrial fibrillation? At first, spells of atrial fibrillation may come on suddenly and last a short time. It may go away on its own or it goes away after treatment. This is called paroxysmal atrial fibrillation. Over time, the spells may last longer and occur more often. They often don't go away on their own. How is it treated?   Treatments can help you feel better and prevent future problems, especially stroke and heart failure. The main types of treatment slow the heart rate, control the heart rhythm, and help prevent stroke. Your treatment will depend on the cause of your atrial fibrillation, your symptoms, and your risk for stroke. · Heart rate treatment. Medicine may be used to slow your heart rate. Your heartbeat may still be irregular. But these medicines keep your heart from beating too fast. They may also help relieve your symptoms. · Heart rhythm treatment. Different treatments may be used to try to stop atrial fibrillation and keep it from returning. They can also relieve symptoms. These treatments include medicine, electrical cardioversion to shock the heart back to a normal rhythm, a procedure called catheter ablation, and heart surgery. · Stroke prevention. You and your doctor can decide how to lower your risk. You may decide to take a blood-thinning medicine, such as aspirin or an anticoagulant. How can you live well with it? You can live well and help manage atrial fibrillation by having a heart-healthy lifestyle. To have a heart-healthy lifestyle:  · Don't smoke. · Eat heart-healthy foods. · Be active. Talk to your doctor about what type and level of exercise is safe for you. · Stay at a healthy weight. Lose weight if you need to. · Manage stress. · Avoid alcohol if it triggers symptoms. · Manage other health problems such as high blood pressure, high cholesterol, and diabetes. · Avoid getting sick from the flu. Get a flu shot every year. When should you call for help? Call 911 anytime you think you may need emergency care. For example, call if:  · You have symptoms of a stroke. These may include:  ¨ Sudden numbness, tingling, weakness, or loss of movement in your face, arm, or leg, especially on only one side of your body. ¨ Sudden vision changes. ¨ Sudden trouble speaking. ¨ Sudden confusion or trouble understanding simple statements. ¨ Sudden problems with walking or balance.   ¨ A sudden, severe headache that is different from past headaches. Call your doctor now or seek immediate medical care if:  · You have new or increased shortness of breath. · You feel dizzy or lightheaded, or you feel like you may faint. Watch closely for changes in your health, and be sure to contact your doctor if you have any problems. Follow-up care is a key part of your treatment and safety. Be sure to make and go to all appointments, and call your doctor if you are having problems. It's also a good idea to know your test results and keep a list of the medicines you take. Where can you learn more? Go to http://martínez-renny.info/. Enter [phone] in the search box to learn more about \"Learning About Atrial Fibrillation. \"  Current as of: March 28, 2016  Content Version: 11.2  © 8644-5217 [employer]. Care instructions adapted under license by [employer] (which disclaims liability or warranty for this information). If you have questions about a medical condition or this instruction, always ask your healthcare professional. [address] any warranty or liability for your use of this information.

## 2017-05-22 NOTE — DISCHARGE SUMMARY
Physician Discharge Summary     Patient ID:  Anel Rodriguez  [id number]  74 y.o.  1945    Admit date: 5/20/2017    Discharge date and time: 5/22/2017    Admission Diagnoses: Atrial fibrillation with rapid ventricular response [facility])    Discharge Diagnoses:    Principal Diagnosis   <principal problem not specified>                                             Other Diagnoses  Active Problems:    S/P ablation of atrial fibrillation (8/15/2016)      Hashimoto's thyroiditis (8/15/2016)      Atrial fibrillation with rapid ventricular response ([address] (5/20/2017)      Dental infection (5/20/2017)         Hospital Course:     Atrial fibrillation with rapid ventricular response S/P ablation of atrial fibrillation   -off dilt gtt  -Mg>2, K, 4  -Thyroid studies okay   -TTE with nml LVEF, LA mod dilated, mild MR   -continue metoprolol and Xarelto  -cardiology on board, stress test normal  -Diuresis limited by BP and would favor rate control over volume management (which will improve with controlled HR)     Hashimoto's thyroiditis  -continue levothyroxine   -TFT's wnl     Dental infection   -on clind (should be completed at this point)  -scheduled to see the periodontist on 5/22; will need to reschedule    PCP: None    Consults: Cardiology    Significant Diagnostic Studies: See Hospital Course    Discharged home in improved condition.     Discharge Exam:     Most recent are:          Visit Vitals    /60 (BP 1 Location: Left arm, BP Patient Position: At rest)    Pulse 94    Temp 97.6 °F (36.4 °C)    Resp 20    Ht 5' 5\" (1.651 m)    Wt 69.4 kg (153 lb)    SpO2 97%    Breastfeeding No    BMI 25.46 kg/m2          SpO2 Readings from Last 6 Encounters:   05/22/17 97%   03/26/17 100%   08/28/16 94%   09/25/15 95%   08/19/14 100%   04/01/14 93%               Intake/Output Summary (Last 24 hours) at 05/22/17 0856  Last data filed at 05/22/17 0422    Gross per 24 hour   Intake 0 ml   Output 200 ml   Net -200 ml       Exam:      Physical Exam:     Gen: Well-developed, well-nourished, in no acute distress  HEENT: Pink conjunctivae, PERRL, hearing intact to voice, moist mucous membranes  Neck: Supple, without masses, thyroid non-tender  Resp: No accessory muscle usage, improved aeration throughout  Card: HR in the 90's-100's. No murmurs, normal S1, S2 without thrills, bruits or peripheral edema  Abd: Soft, non-tender, non-distended, normoactive bowel sounds are present  Musc: No cyanosis or clubbing  Skin: No rashes or ulcers, skin turgor is good  Neuro: Cranial nerves 3-12 are grossly intact,  strength is 5/5 bilaterally and dorsi / plantarflexion is 5/5 bilaterally, follows commands appropriately  Psych: Good insight, oriented to person, place and time, alert      Disposition: home    Patient Instructions:   Current Discharge Medication List      START taking these medications    Details   HYDROcodone-acetaminophen (NORCO) 5-325 mg per tablet Take 1 Tab by mouth every four (4) hours as needed. Max Daily Amount: 6 Tabs. Qty: 12 Tab, Refills: 0      metoprolol tartrate (LOPRESSOR) 50 mg tablet Take 1 Tab by mouth two (2) times a day. Qty: 60 Tab, Refills: 0      rivaroxaban (XARELTO) 20 mg tab tablet Take 1 Tab by mouth daily (with dinner). Qty: 30 Tab, Refills: 0         CONTINUE these medications which have NOT CHANGED    Details   levothyroxine (SYNTHROID) 88 mcg tablet Take 88 mcg by mouth Daily (before breakfast). aspirin delayed-release 81 mg tablet Take 81 mg by mouth daily.  Last dose per dr Yan Wills taking these medications       clindamycin (CLEOCIN) 150 mg capsule Comments:   Reason for Stopping:             Activity: Activity as tolerated  Diet: Cardiac Diet  Wound Care: None needed    Follow-up with Dr. Bubba East on 6/5    Signed:  Garrison Head DO  5/22/2017  2:04 PM    Greater than 30 mins was spent in coordination, counseling, and execution of this patient's discharge

## 2017-05-22 NOTE — PROGRESS NOTES
Discharge instructions and prescriptions reviewed with patient. IV and tele removed. Dimitris Alfred NP stated to continue patients home aspirin of 81mg and that was a question of patients. Signed discharge papers placed in patients chart.

## 2017-05-22 NOTE — PROGRESS NOTES
Dre [place] [facility] [place] [address]  [address]  [phone]      Medical Progress Note      NAME: John Taylor   :  1945  MRM:  [id number]    Date/Time: 2017  11:21 AM         Subjective:     Chief Complaint: \"I've still got this mouth and neck pain\"     Had to cancel her perodontist appointment. She is worried about her stress test this AM. Continues to have facial/headache/neck pain radiating into chest intermittently    ROS:  (bold if positive, if negative)      SOB        Objective:       Vitals:        Last 24hrs VS reviewed since prior progress note. Most recent are:    Visit Vitals    /60 (BP 1 Location: Left arm, BP Patient Position: At rest)    Pulse 94    Temp 97.6 °F (36.4 °C)    Resp 20    Ht 5' 5\" (1.651 m)    Wt 69.4 kg (153 lb)    SpO2 97%    Breastfeeding No    BMI 25.46 kg/m2     SpO2 Readings from Last 6 Encounters:   17 97%   17 100%   16 94%   09/25/15 95%   14 100%   14 93%            Intake/Output Summary (Last 24 hours) at 17 0846  Last data filed at 17 0424   Gross per 24 hour   Intake                0 ml   Output              200 ml   Net             -200 ml          Exam:     Physical Exam:    Gen:  Well-developed, well-nourished, in no acute distress  HEENT:  Pink conjunctivae, PERRL, hearing intact to voice, moist mucous membranes  Neck:  Supple, without masses, thyroid non-tender  Resp:  No accessory muscle usage, improved aeration throughout  Card:  HR in the 90's-100's.  No murmurs, normal S1, S2 without thrills, bruits or peripheral edema  Abd:  Soft, non-tender, non-distended, normoactive bowel sounds are present  Musc:  No cyanosis or clubbing  Skin:  No rashes or ulcers, skin turgor is good  Neuro:  Cranial nerves 3-12 are grossly intact,  strength is 5/5 bilaterally and dorsi / plantarflexion is 5/5 bilaterally, follows commands appropriately  Psych:  Good insight, oriented to person, place and time, alert    Medications Reviewed: (see below)    Lab Data Reviewed: (see below)    ______________________________________________________________________    Medications:     Current Facility-Administered Medications   Medication Dose Route Frequency    ondansetron (ZOFRAN) injection 4 mg  4 mg IntraVENous Q6H PRN    ALPRAZolam (XANAX) tablet 0.25 mg  0.25 mg Oral QID PRN    rivaroxaban (XARELTO) tablet 20 mg  20 mg Oral DAILY WITH DINNER    metoprolol tartrate (LOPRESSOR) tablet 50 mg  50 mg Oral BID    sodium chloride (NS) flush 5-10 mL  5-10 mL IntraVENous Q8H    aspirin chewable tablet 81 mg  81 mg Oral DAILY    acetaminophen (TYLENOL) tablet 650 mg  650 mg Oral Q4H PRN    HYDROcodone-acetaminophen (NORCO) 5-325 mg per tablet 1 Tab  1 Tab Oral Q4H PRN    sodium chloride (NS) flush 5-10 mL  5-10 mL IntraVENous PRN    clindamycin (CLEOCIN) capsule 150 mg  150 mg Oral Q6H    levothyroxine (SYNTHROID) tablet 88 mcg  88 mcg Oral ACB            Lab Review:     Recent Labs      05/22/17   0159  05/20/17   1540   WBC  4.3  10.5   HGB  12.9  14.4   HCT  37.2  43.1   PLT  242  295     Recent Labs      05/22/17   0159  05/21/17   0444  05/20/17   1540   NA  140  140  139   K  3.9  4.0  3.6   CL  107  108  104   CO2  28  25  27   GLU  96  100  120*   BUN  14  15  19   CREA  0.74  0.75  0.95   CA  8.6  8.3*  8.8   MG  1.9   --    --    ALB   --    --   3.5   SGOT   --    --   11*   ALT   --    --   12   INR   --    --   1.0     No components found for: [place]         Assessment / Plan:   Atrial fibrillation with rapid ventricular response S/P ablation of atrial fibrillation   -off dilt gtt  -Mg>2, K, 4  -Thyroid studies okay   -TTE with nml LVEF, LA mod dilated, mild MR   -continue metoprolol and Xarelto  -cardiology on board   -Diuresis limited by BP and would favor rate control over volume management (which will improve with controlled HR)     Hashimoto's thyroiditis  -continue levothyroxine   -TFT's wnl    Dental infection   -on clind  -scheduled to see the periodontist on 5/22; will need to reschedule    Total time spent with patient: 35 [address] [facility] discussed with: Patient, Nursing Staff, Consultant/Specialist and >50% of time spent in counseling and coordination of care    Discussed:  Code Status, Care Plan and D/C Planning    Prophylaxis:  Lovenox    Disposition:  Home w/Family           ___________________________________________________    Attending Physician: Lisandro Jama DO

## 2017-05-26 LAB
BACTERIA SPEC CULT: NORMAL
BACTERIA SPEC CULT: NORMAL
SERVICE CMNT-IMP: NORMAL
SERVICE CMNT-IMP: NORMAL

## 2017-06-05 ENCOUNTER — OFFICE VISIT (OUTPATIENT)
Dept: CARDIOLOGY CLINIC | Age: 72
End: 2017-06-05

## 2017-06-05 VITALS
SYSTOLIC BLOOD PRESSURE: 128 MMHG | HEART RATE: 56 BPM | HEIGHT: 65 IN | OXYGEN SATURATION: 98 % | RESPIRATION RATE: 16 BRPM | WEIGHT: 154.6 LBS | BODY MASS INDEX: 25.76 KG/M2 | DIASTOLIC BLOOD PRESSURE: 82 MMHG

## 2017-06-05 DIAGNOSIS — F17.210 CIGARETTE NICOTINE DEPENDENCE WITHOUT COMPLICATION: ICD-10-CM

## 2017-06-05 DIAGNOSIS — Z86.79 S/P ABLATION OF ATRIAL FIBRILLATION: ICD-10-CM

## 2017-06-05 DIAGNOSIS — R53.82 CHRONIC FATIGUE: ICD-10-CM

## 2017-06-05 DIAGNOSIS — I48.0 PAROXYSMAL A-FIB (HCC): Primary | ICD-10-CM

## 2017-06-05 DIAGNOSIS — Z98.890 S/P ABLATION OF ATRIAL FIBRILLATION: ICD-10-CM

## 2017-06-05 DIAGNOSIS — R06.09 DOE (DYSPNEA ON EXERTION): ICD-10-CM

## 2017-06-05 NOTE — PATIENT INSTRUCTIONS
1. Get into the habit of measuring and logging your pulse daily. 2. Will refer you to Dr. Renae Garduno.

## 2017-06-05 NOTE — MR AVS SNAPSHOT
Visit Information Date & Time Provider Department Dept. Phone Encounter #  
 6/5/2017  9:40 AM Ross Tan MD [facility] [place] [phone] [id number] Follow-up Instructions Return in about 3 months (around 9/5/2017). Upcoming Health Maintenance Date Due DTaP/Tdap/Td series (1 - Tdap) 5/16/1964 FOBT Q 1 YEAR AGE 50-75 5/16/1993 ZOSTER VACCINE AGE 60> 5/16/2003 GLAUCOMA SCREENING Q2Y 5/16/2008 OSTEOPOROSIS SCREENING (DEXA) 5/16/2008 Pneumococcal 65+ Low/Medium Risk (1 of 2 - PCV13) 5/16/2008 MEDICARE YEARLY EXAM 5/16/2008 INFLUENZA AGE 9 TO ADULT 8/1/2017 BREAST CANCER SCRN MAMMOGRAM 8/1/2018 Allergies as of 6/5/2017  Review Complete On: 6/5/2017 By: Pancho Mueller LPN No Known Allergies Current Immunizations  Never Reviewed No immunizations on file. Not reviewed this visit You Were Diagnosed With   
  
 Codes Comments Atrial fibrillation with rapid ventricular response (HCC)    -  Primary ICD-10-CM: I48.91 
ICD-9-CM: 427.31 Paroxysmal a-fib (HCC)     ICD-10-CM: I48.0 ICD-9-CM: 427.31 S/P ablation of atrial fibrillation     ICD-10-CM: Z98.890, Z86.79 
ICD-9-CM: V45.89   
 MARY (dyspnea on exertion)     ICD-10-CM: R06.09 
ICD-9-CM: 786.09 Chronic fatigue     ICD-10-CM: R53.82 
ICD-9-CM: 780.79 Lightheadedness     ICD-10-CM: W63 ICD-9-CM: 780.4 Vitals BP Pulse Resp Height(growth percentile) Weight(growth percentile) SpO2  
 128/82 (BP 1 Location: Left arm, BP Patient Position: Sitting) (!) 56 16 5' 5\" (1.651 m) 154 lb 9.6 oz (70.1 kg) 98% BMI OB Status Smoking Status 25.73 kg/m2 Postmenopausal Current Every Day Smoker BMI and BSA Data Body Mass Index Body Surface Area 25.73 kg/m 2 1.79 m 2 Preferred Pharmacy Pharmacy Name Phone [facility] [place], [place] - [address] AT [address] [phone] Your Updated Medication List  
  
   
This list is accurate as of: 6/5/17 10:23 AM.  Always use your most recent med list.  
  
  
  
  
 aspirin delayed-release 81 mg tablet Take 81 mg by mouth daily. Last dose per dr Benjamin Gardner  
  
 levothyroxine 88 mcg tablet Commonly known as:  SYNTHROID Take 88 mcg by mouth Daily (before breakfast). metoprolol tartrate 50 mg tablet Commonly known as:  LOPRESSOR Take 1 Tab by mouth two (2) times a day. OTHER Ferralef 90mg daily. rivaroxaban 20 mg Tab tablet Commonly known as:  Jerlene Meyers Take 1 Tab by mouth daily (with dinner). We Performed the Following AMB POC EKG ROUTINE W/ 12 LEADS, INTER & REP [78043 CPT(R)] Follow-up Instructions Return in about 3 months (around 9/5/2017). Patient Instructions 1. Get into the habit of measuring and logging your pulse daily. 2. Will refer you to Dr. Renae Garduno. Introducing \[facility]\"" & HEALTH SERVICES! Vahid Diaz introduces [employer] patient portal. Now you can access parts of your medical record, email your doctor's office, and request medication refills online. 1. In your internet browser, go to [email or web address]. [email or web address] 2. Click on the First Time User? Click Here link in the Sign In box. You will see the New Member Sign Up page. 3. Enter your [employer] Access Code exactly as it appears below. You will not need to use this code after youve completed the sign-up process. If you do not sign up before the expiration date, you must request a new code. · [employer] Access Code: [email or web address] Expires: 6/24/2017  1:07 PM 
 
4. Enter the last four digits of your Social Security Number (xxxx) and Date of Birth (mm/dd/yyyy) as indicated and click Submit. You will be taken to the next sign-up page. 5. Create a [employer] ID. This will be your [employer] login ID and cannot be changed, so think of one that is secure and easy to remember. 6. Create a [employer] password. You can change your password at any time. 7. Enter your Password Reset Question and Answer. This can be used at a later time if you forget your password. 8. Enter your e-mail address. You will receive e-mail notification when new information is available in [address] 9. Click Sign Up. You can now view and download portions of your medical record. 10. Click the Download Summary menu link to download a portable copy of your medical information. If you have questions, please visit the Frequently Asked Questions section of the [employer] website. Remember, [employer] is NOT to be used for urgent needs. For medical emergencies, dial 911. Now available from your iPhone and Android! Please provide this summary of care documentation to your next provider. Your primary care clinician is listed as Joey Moseley. If you have any questions after today's visit, please call [phone].

## 2017-06-05 NOTE — PROGRESS NOTES
Laureen White MD [facility]  [address]  [place], [address] [place] [address]    Office [phone]  Fax [phone]  Cell [phone]        Emerald Washington is a 74 y.o. female who presents for follow up from hospitalization from 05/20/17-05/22/17 for AF with RVR. Assessment  Encounter Diagnoses   Name Primary?  Paroxysmal a-fib (HCC) Yes    S/P ablation of atrial fibrillation     MARY (dyspnea on exertion)     Chronic fatigue     Cigarette nicotine dependence without complication        Recommendations:    Emerald Washington has recurrent AF two years post ablation. She is back in sinus rhythm today. Updated cardiac testing demonstrated no ischemic or structural heart disease other than moderate LAE. We discussed the role of repeat AF ablation. However, I would like to defer for now until we can get a handle on the cause of her chronic MARY. Will refer her to Dr. Delano [facility] from [facility] to evaluate further. She is agreeable with this strategy. I encouraged her to stop smoking. Her thyroid condition is under good control. Recent TSH was 0.6. Will see her back in 3 months. Subjective:    See my consult note dated 05/20/17. Hx of AF s/p multiple cardioversions, s/p PV isolation 2015 in [place]. No structural or ischemic heart disease other than moderate MR by echo 2015 when in AF. No hx of HTN, DM. No alcohol or significant caffeine use. Significant family stressors noted. She was rate controlled, but was in persistent AF at the time of discharge. Xarelto was started for stroke prevention. Echo and stress nuclear study was normal other than moderate LAE. She feels that she is going in and out of AF daily, but associated chest pain. No bleeding issues on Xarelto. She continues to experience MARY, fatigue and intermittent lightheadedness, but no chest pain.  Patient denies any exertional chest pain, syncope, orthopnea, edema or paroxysmal nocturnal dyspnea. Drink a cup of coffee per day. She admits eating \"a lot of chocolate\" daily. Does not drink any alcohol. Cardiac risk factors   HTN no  DM no  Smoking yes- 1/2 pack per day. Family hx of CAD yes    Cardiac testing  No specialty comments available. Past Medical History:   Diagnosis Date    Atrial fibrillation [facility])     s/p pulm vein isolation 2015    Atrial fibrillation with rapid ventricular response (HCC) 5/20/2017    Hypothyroidism         Current Outpatient Prescriptions   Medication Sig Dispense Refill    metoprolol tartrate (LOPRESSOR) 50 mg tablet Take 1 Tab by mouth two (2) times a day. 180 Tab 3    rivaroxaban (XARELTO) 20 mg tab tablet Take 1 Tab by mouth daily (with dinner). 90 Tab 3    OTHER Ferralef 90mg daily.  levothyroxine (SYNTHROID) 88 mcg tablet Take 88 mcg by mouth Daily (before breakfast).  aspirin delayed-release 81 mg tablet Take 81 mg by mouth daily. Last dose per dr Isidra Hays oxyCODONE-acetaminophen (PERCOCET) 5-325 mg per tablet Take 1 Tab by mouth every four (4) hours as needed for Pain. Max Daily Amount: 6 Tabs. 10 Tab 0       No Known Allergies       Review of Systems  Constitutional: Negative for fever, chills and diaphoresis. Positive for fatigue. Respiratory: Negative for cough, hemoptysis, sputum production and wheezing. Positive for MARY. Cardiovascular: Negative for chest pain,  orthopnea, claudication, leg swelling and PND. Positive for palpitations. Gastrointestinal: Negative for heartburn, nausea, vomiting, blood in stool and melena. Genitourinary: Negative for dysuria and flank pain. Musculoskeletal: Negative for joint pain and back pain. Skin: Negative for rash. Neurological: Negative for focal weakness, seizures, loss of consciousness, weakness and headaches. Endo/Heme/Allergies: Does not bruise/bleed easily. Psychiatric/Behavioral: Negative for memory loss. The patient does not have insomnia.       Physical Exam    Visit Vitals    /82 (BP 1 Location: Left arm, BP Patient Position: Sitting)    Pulse (!) 56    Resp 16    Ht 5' 5\" (1.651 m)    Wt 154 lb 9.6 oz (70.1 kg)    SpO2 98%    BMI 25.73 kg/m2     Wt Readings from Last 3 Encounters:   06/06/17 154 lb (69.9 kg)   06/05/17 154 lb 9.6 oz (70.1 kg)   05/22/17 153 lb (69.4 kg)      General - well developed well nourished  Neck - JVP normal, thyroid nl  Cardiac - normal S1, S2, no murmurs, rubs or gallops.  No clicks  Vascular - carotids without bruits, radials, femorals and pedal pulses equal bilateral  Lungs - clear to auscultation bilaterals, no rales, wheezing or rhonchi  Abd - soft nontender, no HSM, no abd bruits  Extremities - no edema  Skin - no rash  Neuro - nonfocal  Psych - normal mood and affect    Cardiographics  Lexiscan Cardiolite 05/22/17- normal perfusion, LVEF 76%  Echo 05/21/17- LVEF 60%, moderate LAE mild MR   EKG 06/05/17- SR, 56 intemittent PVCs, otherwise normal     Written by Jordyn Molina, as dictated by Israel Vance MD.   Israel Vance MD

## 2017-06-06 ENCOUNTER — HOSPITAL ENCOUNTER (EMERGENCY)
Age: 72
Discharge: HOME | End: 2017-06-07
Attending: EMERGENCY MEDICINE
Payer: MEDICARE

## 2017-06-06 DIAGNOSIS — S89.91XA RIGHT KNEE INJURY, INITIAL ENCOUNTER: Primary | ICD-10-CM

## 2017-06-06 DIAGNOSIS — T07.XXXA ABRASIONS OF MULTIPLE SITES: ICD-10-CM

## 2017-06-06 PROCEDURE — 99285 EMERGENCY DEPT VISIT HI MDM: CPT

## 2017-06-06 PROCEDURE — 90471 IMMUNIZATION ADMIN: CPT

## 2017-06-06 RX ORDER — OXYCODONE AND ACETAMINOPHEN 5; 325 MG/1; MG/1
1 TABLET ORAL
Status: COMPLETED | OUTPATIENT
Start: 2017-06-06 | End: 2017-06-07

## 2017-06-07 ENCOUNTER — APPOINTMENT (OUTPATIENT)
Dept: GENERAL RADIOLOGY | Age: 72
End: 2017-06-07
Attending: EMERGENCY MEDICINE
Payer: MEDICARE

## 2017-06-07 VITALS
WEIGHT: 154 LBS | HEART RATE: 58 BPM | DIASTOLIC BLOOD PRESSURE: 60 MMHG | OXYGEN SATURATION: 96 % | TEMPERATURE: 98.7 F | RESPIRATION RATE: 17 BRPM | HEIGHT: 65 IN | BODY MASS INDEX: 25.66 KG/M2 | SYSTOLIC BLOOD PRESSURE: 121 MMHG

## 2017-06-07 PROCEDURE — L1830 KO IMMOB CANVAS LONG PRE OTS: HCPCS

## 2017-06-07 PROCEDURE — 74011250637 HC RX REV CODE- 250/637: Performed by: EMERGENCY MEDICINE

## 2017-06-07 PROCEDURE — 74011250636 HC RX REV CODE- 250/636: Performed by: EMERGENCY MEDICINE

## 2017-06-07 PROCEDURE — 90715 TDAP VACCINE 7 YRS/> IM: CPT | Performed by: EMERGENCY MEDICINE

## 2017-06-07 PROCEDURE — 73562 X-RAY EXAM OF KNEE 3: CPT

## 2017-06-07 RX ORDER — OXYCODONE AND ACETAMINOPHEN 5; 325 MG/1; MG/1
1 TABLET ORAL
Qty: 10 TAB | Refills: 0 | Status: SHIPPED | OUTPATIENT
Start: 2017-06-07 | End: 2017-08-22

## 2017-06-07 RX ADMIN — OXYCODONE HYDROCHLORIDE AND ACETAMINOPHEN 1 TABLET: 5; 325 TABLET ORAL at 00:05

## 2017-06-07 RX ADMIN — TETANUS TOXOID, REDUCED DIPHTHERIA TOXOID AND ACELLULAR PERTUSSIS VACCINE, ADSORBED 0.5 ML: 5; 2.5; 8; 8; 2.5 SUSPENSION INTRAMUSCULAR at 00:05

## 2017-06-07 NOTE — ED TRIAGE NOTES
Pt presents from home via EMS with c/o right knee pain after being at this facility for interview around 2-3 this afternoon. Pt reports falling on the concrete in the back of the parking lot upon leaving. No CODE orange called bc pt did not notify anyone.

## 2017-06-07 NOTE — DISCHARGE INSTRUCTIONS
We hope that we have addressed all of your medical concerns. The examination and treatment you received in the Emergency Department were for an emergent problem and were not intended as complete care. It is important that you follow up with your healthcare provider(s) for ongoing care. If your symptoms worsen or do not improve as expected, and you are unable to reach your usual health care provider(s), you should return to the Emergency Department. Today's healthcare is undergoing tremendous change, and patient satisfaction surveys are one of the many tools to assess the quality of medical care. You may receive a survey from the [employer] regarding your experience in the Emergency Department. I hope that your experience has been completely positive, particularly the medical care that I provided. As such, please participate in the survey; anything less than excellent does not meet my expectations or intentions. [facility] and [facility] participate in nationally recognized quality of care measures. If your blood pressure is greater than 120/80, as reported below, we urge that you seek medical care to address the potential of high blood pressure, commonly known as hypertension. Hypertension can be hereditary or can be caused by certain medical conditions, pain, stress, or \"white coat syndrome. \"       Please make an appointment with your health care provider(s) for follow up of your Emergency Department visit. VITALS:   Patient Vitals for the past 8 hrs:   Temp Pulse Resp BP SpO2   06/06/17 2334 98.7 °F (37.1 °C) (!) 58 17 129/68 98 %          Thank you for allowing us to provide you with medical care today. We realize that you have many choices for your emergency care needs. Please choose us in the future for any continued health care needs. Nel Fritz, [address] Office: [phone]            No results found for this or any previous visit (from the past 24 hour(s)). Xr Knee Rt 3 V    Result Date: 6/7/2017  EXAM:  XR KNEE RT 3 V INDICATION:   trauma. Right knee pain COMPARISON: None. FINDINGS: Three views of the right knee demonstrate no fracture or other acute osseous or articular abnormality. There is a joint effusion. 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 IMPRESSION:  Joint effusion with DJD.

## 2017-06-07 NOTE — ED NOTES
Dr. Socorro Mccray has reviewed discharge instructions with patient and self including prescription(s) if applicable. Patient has received and verbalizes understanding of all instructions and has no further questions at this time. Patient exits ED ambulatory with crutches accompanied by self. Patient in no acute distress.

## 2017-06-07 NOTE — ED PROVIDER NOTES
HPI Comments: 74 y.o. female with past medical history significant for right knee arthroscopic surgery 8 years ago who presents from home with chief complaint of right knee pain after mechanical GLF. Pt reports that this afternoon around 2:00 PM she was walking across the parking lot when she tripped over her sandal and fell on to her right side. She was able to ambulate and bear weight immediately after the incident with only minimal pain, but reports that as the evening has progressed she has had increased swelling and pain to her right knee. Her pain is exacerbated with movement of the knee and ambulation. She tried taking a pain pill that she had left over from a previous prescription for her Sx with minimal relief. Pt denies LOC, head injury, any other injuries, fever, chills. There are no other acute medical concerns at this time. PCP: Marcus Anderson NP  Note written by coleen Owens, as dictated by Enrico Hernadez,  11:49 PM        The history is provided by the patient. Past Medical History:   Diagnosis Date    Atrial fibrillation ([address]     s/p pulm vein isolation 2015    Hypothyroidism        Past Surgical History:   Procedure Laterality Date    CARDIAC SURG PROCEDURE UNLIST      cardioversionx4    HX AFIB ABLATION  2016    HX CATARACT REMOVAL Left     HX CHOLECYSTECTOMY  08/26/2016    HX ORTHOPAEDIC Bilateral     arthroscopic knee surgery         Family History:   Problem Relation Age of Onset    Heart Disease Father     Cancer Paternal Aunt        Social History     Social History    Marital status: SINGLE     Spouse name: N/A    Number of children: N/A    Years of education: N/A     Occupational History    Not on file.      Social History Main Topics    Smoking status: Current Every Day Smoker     Packs/day: 1.00     Years: 50.00    Smokeless tobacco: Not on file    Alcohol use Yes      Comment: social    Drug use: No    Sexual activity: Not Currently Partners: Male     Other Topics Concern    Not on file     Social History Narrative     ALLERGIES: Review of patient's allergies indicates no known allergies. Review of Systems   Constitutional: Negative for appetite change, chills, fever and unexpected weight change. HENT: Negative for ear pain, hearing loss, rhinorrhea and trouble swallowing. Eyes: Negative for pain and visual disturbance. Respiratory: Negative for cough, chest tightness and shortness of breath. Cardiovascular: Negative for chest pain and palpitations. Gastrointestinal: Negative for abdominal distention, abdominal pain, blood in stool and vomiting. Genitourinary: Negative for dysuria, hematuria and urgency. Musculoskeletal: Positive for arthralgias and joint swelling. Negative for back pain and myalgias. Skin: Positive for wound. Negative for rash. Neurological: Negative for dizziness, syncope, weakness and numbness. Psychiatric/Behavioral: Negative for confusion and suicidal ideas. All other systems reviewed and are negative. Vitals:    06/06/17 2334   BP: 129/68   Pulse: (!) 58   Resp: 17   Temp: 98.7 °F (37.1 °C)   SpO2: 98%   Weight: 69.9 kg (154 lb)   Height: 5' 5\" (1.651 m)            Physical Exam   Constitutional: She is oriented to person, place, and time. She appears well-developed and well-nourished. No distress. HENT:   Head: Normocephalic and atraumatic. Right Ear: External ear normal.   Left Ear: External ear normal.   Nose: Nose normal.   Mouth/Throat: Oropharynx is clear and moist. No oropharyngeal exudate. Eyes: Conjunctivae and EOM are normal. Pupils are equal, round, and reactive to light. Right eye exhibits no discharge. Left eye exhibits no discharge. No scleral icterus. Neck: Normal range of motion. Neck supple. No JVD present. No tracheal deviation present. Cardiovascular: Normal rate, regular rhythm, normal heart sounds and intact distal pulses.   Exam reveals no gallop and no friction rub. No murmur heard. Pulmonary/Chest: Effort normal and breath sounds normal. No stridor. No respiratory distress. She has no decreased breath sounds. She has no wheezes. She has no rhonchi. She has no rales. She exhibits no tenderness. Abdominal: Soft. Bowel sounds are normal. She exhibits no distension. There is no tenderness. There is no rebound and no guarding. Musculoskeletal: Normal range of motion. She exhibits tenderness. She exhibits no edema or deformity. Mild swelling and ecchymosis to right knee. Limited ROM second to pain, neurovascularly intact distally. Neurological: She is alert and oriented to person, place, and time. She has normal strength and normal reflexes. No cranial nerve deficit or sensory deficit. She exhibits normal muscle tone. Coordination normal. GCS eye subscore is 4. GCS verbal subscore is 5. GCS motor subscore is 6. Skin: Skin is warm and dry. No rash noted. She is not diaphoretic. No erythema. No pallor. Abrasions to right elbow and right proximal palm of hand. Psychiatric: She has a normal mood and affect. Her behavior is normal. Judgment and thought content normal.   Nursing note and vitals reviewed. Note written by coleen Cristina, as dictated by Daleen Sicard, DO 11:54 PM      MDM  Number of Diagnoses or Management Options  Abrasions of multiple sites:   Right knee injury, initial encounter:      Amount and/or Complexity of Data Reviewed  Tests in the radiology section of CPT®: ordered and reviewed    Risk of Complications, Morbidity, and/or Mortality  Presenting problems: moderate  Diagnostic procedures: low  Management options: moderate    Patient Progress  Patient progress: stable    ED Course       Procedures  Chief Complaint   Patient presents with    Knee Pain    Knee Injury       1:38 AM  The patients presenting problems have been discussed, and they are in agreement with the care plan formulated and outlined with them.   I have encouraged them to ask questions as they arise throughout their visit. MEDICATIONS GIVEN:  Medications   diph,Pertuss(AC),Tet Vac-PF (BOOSTRIX) suspension 0.5 mL (0.5 mL IntraMUSCular Given 6/7/17 0005)   oxyCODONE-acetaminophen (PERCOCET) 5-325 mg per tablet 1 Tab (1 Tab Oral Given 6/7/17 0005)       LABS REVIEWED:  No results found for this or any previous visit (from the past 24 hour(s)). VITAL SIGNS:  Patient Vitals for the past 12 hrs:   Temp Pulse Resp BP SpO2   06/06/17 2334 98.7 °F (37.1 °C) (!) 58 17 129/68 98 %       RADIOLOGY RESULTS:  The following have been ordered and reviewed:  XR KNEE RT 3 V   Final Result        Study Result      EXAM: XR KNEE RT 3 V     INDICATION: trauma. Right knee pain     COMPARISON: None.     FINDINGS: Three views of the right knee demonstrate no fracture or other acute  osseous or articular abnormality. There is a joint effusion. 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     IMPRESSION  IMPRESSION: Joint effusion with DJD. PROGRESS NOTES:  Discussed results and plan with patient. Patient will be discharged home with ortho and PCP followup. Patient instructed to return to the emergency room for any worsening symptoms or any other concerns. DIAGNOSIS:    1. Right knee injury, initial encounter    2. Abrasions of multiple sites        PLAN:  Follow-up Information     Follow up With Details Comments Contact Info    Yan Khan MD Schedule an appointment as soon as possible for a visit  [address] [place] [address]      Abdi Wilkes NP In 1 week As needed [address]  [phone]      [facility] EMERGENCY DEPT  If symptoms worsen [facility]  [phone]        Current Discharge Medication List      START taking these medications    Details   oxyCODONE-acetaminophen (PERCOCET) 5-325 mg per tablet Take 1 Tab by mouth every four (4) hours as needed for Pain.  Max Daily Amount: 6 Tabs.  Qty: 10 Tab, Refills: 0         CONTINUE these medications which have NOT CHANGED    Details   OTHER Ferralef 90mg daily. metoprolol tartrate (LOPRESSOR) 50 mg tablet Take 1 Tab by mouth two (2) times a day. Qty: 60 Tab, Refills: 0      rivaroxaban (XARELTO) 20 mg tab tablet Take 1 Tab by mouth daily (with dinner). Qty: 30 Tab, Refills: 0      levothyroxine (SYNTHROID) 88 mcg tablet Take 88 mcg by mouth Daily (before breakfast). aspirin delayed-release 81 mg tablet Take 81 mg by mouth daily. Last dose per dr Kimberly Mclain               ED COURSE: The patients hospital course has been uncomplicated.

## 2017-06-07 NOTE — ED NOTES
Pt requesting assistance with ride home. Pt does not have the means for a cab bill at this time. Informed Pt that a one time cab voucher can be issued. Pt grateful and understands this is a one time voucher provided by [facility] ED. Address verified with pt. Primary RN made aware.

## 2017-06-08 ENCOUNTER — DOCUMENTATION ONLY (OUTPATIENT)
Dept: CARDIOLOGY CLINIC | Age: 72
End: 2017-06-08

## 2017-06-08 DIAGNOSIS — R06.09 DOE (DYSPNEA ON EXERTION): Primary | ICD-10-CM

## 2017-06-10 PROBLEM — I48.91 ATRIAL FIBRILLATION WITH RAPID VENTRICULAR RESPONSE (HCC): Status: RESOLVED | Noted: 2017-05-20 | Resolved: 2017-06-10

## 2017-06-10 PROBLEM — F17.210 CIGARETTE NICOTINE DEPENDENCE WITHOUT COMPLICATION: Status: ACTIVE | Noted: 2017-06-10

## 2017-06-10 RX ORDER — METOPROLOL TARTRATE 50 MG/1
50 TABLET ORAL 2 TIMES DAILY
Qty: 180 TAB | Refills: 3 | Status: SHIPPED | OUTPATIENT
Start: 2017-06-10 | End: 2017-10-24 | Stop reason: SDUPTHER

## 2017-07-27 ENCOUNTER — TELEPHONE (OUTPATIENT)
Dept: CARDIOLOGY CLINIC | Age: 72
End: 2017-07-27

## 2017-07-27 NOTE — TELEPHONE ENCOUNTER
Patient is requesting an appt. To come in sooner, is having SOB and palpitation. stated that she has Seen Dr. Kathy Wesley and has an up and coming appt. For more testing in a few weeks. Will contact Dr. Kathy Wesley office to get records and will call patient with further instructions.

## 2017-08-22 ENCOUNTER — OFFICE VISIT (OUTPATIENT)
Dept: CARDIOLOGY CLINIC | Age: 72
End: 2017-08-22

## 2017-08-22 ENCOUNTER — HOSPITAL ENCOUNTER (OUTPATIENT)
Dept: LAB | Age: 72
Discharge: HOME | End: 2017-08-22
Payer: MEDICARE

## 2017-08-22 VITALS
DIASTOLIC BLOOD PRESSURE: 50 MMHG | HEART RATE: 69 BPM | OXYGEN SATURATION: 98 % | HEIGHT: 65 IN | SYSTOLIC BLOOD PRESSURE: 90 MMHG | RESPIRATION RATE: 16 BRPM

## 2017-08-22 DIAGNOSIS — D50.9 IRON DEFICIENCY ANEMIA, UNSPECIFIED IRON DEFICIENCY ANEMIA TYPE: ICD-10-CM

## 2017-08-22 DIAGNOSIS — R06.09 DOE (DYSPNEA ON EXERTION): Primary | ICD-10-CM

## 2017-08-22 DIAGNOSIS — I49.3 FREQUENT PVCS: ICD-10-CM

## 2017-08-22 DIAGNOSIS — I48.0 PAROXYSMAL A-FIB (HCC): ICD-10-CM

## 2017-08-22 DIAGNOSIS — Z98.890 S/P ABLATION OF ATRIAL FIBRILLATION: ICD-10-CM

## 2017-08-22 DIAGNOSIS — Z86.79 S/P ABLATION OF ATRIAL FIBRILLATION: ICD-10-CM

## 2017-08-22 PROCEDURE — 80048 BASIC METABOLIC PNL TOTAL CA: CPT

## 2017-08-22 PROCEDURE — 85027 COMPLETE CBC AUTOMATED: CPT

## 2017-08-22 PROCEDURE — 36415 COLL VENOUS BLD VENIPUNCTURE: CPT

## 2017-08-22 NOTE — PROGRESS NOTES
Laureen MASTERSON [address], [address]  [address],  [address]  [place], [address] [facility]    Office [phone]  Fax [phone]  Cell [phone]        Elle Kowalski is a 74 y.o. female last seen 3 months ago. Assessment  Encounter Diagnoses   Name Primary?  Paroxysmal a-fib (HCC)     S/P ablation of atrial fibrillation     MAYR (dyspnea on exertion) Yes    Iron deficiency anemia, unspecified iron deficiency anemia type     Frequent PVCs        Recommendations:    Elle Kowalski has paroxysmal AF s/p ablation two years ago in [place]. She was hospitalized with AF 2 months ago but has remained in SR. She has no structural or ischemic heart disease other than moderate LAE bytesting in May. She continues to have significant MARY, unclear etiology. She does not have severe COPD. I am concerned about recurrent anemia particularly being chronically anticoagulated. Will check CBC today. BP is low with ventricular bigeminy. Reduce Metoprolol to 25 mg BID. Will check a BMP today. If her lab work is unremarkable, would have here see Dana Alfred NP for further evaluation. Follow-up Disposition:  Return in about 3 months (around 11/22/2017). Subjective:    Patient states she has not felt well overall. She reports MARY after walking 2-3 steps with fatigue, dizziness and lightheadedness. She also states she has had difficulty sleeping, will not sleep for two days and then sleep for excessive amounts afterwards. BPs at home have been low. She saw the pulmonologist earlier this month and she was started on an inhaler that has not helped her breathing. PFTs at that time showed FEV1 1.5 liters (70%). She states she feels skipped beats. Thyroid was checked recently and was okay. She notes that she had anemia in the past requiring iron infusions that were associated with symptoms similar to current symptoms. She is unsure if current sxs are due to anemia.  She last saw her PCP 2 months ago. She continues to smoke. She rarely drinks alcohol. Patient denies any heart burn, urinary sxs, blood in stool, exertional chest pain, syncope, orthopnea, edema or paroxysmal nocturnal dyspnea. Denies any BRBPR or melena. No hematuria    Cardiac risk factors   HTN no  DM no  Smoking yes- 1/2 pack per day. Family hx of CAD yes    Cardiac testing  Lexiscan Cardiolite 5/22/17- normal perfusion, LVEF 76%  Echo 5/21/16 LVEF 60%, mild MR, moderate LAE    Past Medical History:   Diagnosis Date    Atrial fibrillation (HCC)     s/p pulm vein isolation 2015    Atrial fibrillation with rapid ventricular response ([facility] [address] 5/20/2017    Hypothyroidism         Current Outpatient Prescriptions   Medication Sig Dispense Refill    metoprolol tartrate (LOPRESSOR) 50 mg tablet Take 1 Tab by mouth two (2) times a day. 180 Tab 3    rivaroxaban (XARELTO) 20 mg tab tablet Take 1 Tab by mouth daily (with dinner). 90 Tab 3    levothyroxine (SYNTHROID) 88 mcg tablet Take 88 mcg by mouth Daily (before breakfast).  aspirin delayed-release 81 mg tablet Take 81 mg by mouth daily. Last dose per dr Blake Christensen         No Known Allergies       Review of Systems  Constitutional: Negative for fever, chills and diaphoresis. Positive for fatigue. Respiratory: Negative for cough, hemoptysis, sputum production and wheezing. Positive for MARY. Cardiovascular: Negative for chest pain,  orthopnea, claudication, leg swelling and PND. Positive for palpitations. Gastrointestinal: Negative for heartburn, nausea, vomiting, blood in stool and melena. Genitourinary: Negative for dysuria and flank pain. Musculoskeletal: Negative for joint pain and back pain. Skin: Negative for rash. Neurological: Negative for focal weakness, seizures, loss of consciousness, weakness and headaches. Positive for lightheadedness and dizziness. Endo/Heme/Allergies: Does not bruise/bleed easily. Psychiatric/Behavioral: Negative for memory loss.  The patient does not have insomnia. Physical Exam    Visit Vitals    BP 90/50 (BP 1 Location: Left arm, BP Patient Position: Sitting)    Pulse 69    Resp 16    Ht 5' 5\" (1.651 m)    SpO2 98%     Wt Readings from Last 3 Encounters:   06/06/17 154 lb (69.9 kg)   06/05/17 154 lb 9.6 oz (70.1 kg)   05/22/17 153 lb (69.4 kg)      General - well developed well nourished but pale  Neck - JVP normal, thyroid nl  Cardiac - normal S1, S2, no murmurs, rubs or gallops.  No clicks  Vascular - carotids without bruits, radials, femorals and pedal pulses equal bilateral  Lungs - clear to auscultation bilaterals, no rales, wheezing or rhonchi  Abd - soft nontender, no HSM, no abd bruits  Extremities - no edema  Skin - no rash  Neuro - nonfocal  Psych - normal mood and affect    Cardiographics  Lexiscan Cardiolite 05/22/17- normal perfusion, LVEF 76%  Echo 05/21/17- LVEF 60%, moderate LAE mild MR   EKG 06/05/17- SR, 56 intemittent PVCs, otherwise normal   EKG 8/22/17- SR 69, ventricular bigeminy    Written by Ann Marie Howard, as dictated by Dana Valero MD.   Dana Valero MD

## 2017-08-23 ENCOUNTER — TELEPHONE (OUTPATIENT)
Dept: CARDIOLOGY CLINIC | Age: 72
End: 2017-08-23

## 2017-08-23 ENCOUNTER — APPOINTMENT (OUTPATIENT)
Dept: GENERAL RADIOLOGY | Age: 72
DRG: 813 | End: 2017-08-23
Attending: EMERGENCY MEDICINE
Payer: MEDICARE

## 2017-08-23 ENCOUNTER — HOSPITAL ENCOUNTER (INPATIENT)
Age: 72
LOS: 1 days | Discharge: HOME | DRG: 813 | End: 2017-08-24
Attending: EMERGENCY MEDICINE | Admitting: INTERNAL MEDICINE
Payer: MEDICARE

## 2017-08-23 DIAGNOSIS — D62 ACUTE BLOOD LOSS ANEMIA: Primary | ICD-10-CM

## 2017-08-23 PROBLEM — E03.9 HYPOTHYROID: Chronic | Status: ACTIVE | Noted: 2017-08-23

## 2017-08-23 PROBLEM — R53.83 FATIGUE: Status: ACTIVE | Noted: 2017-08-23

## 2017-08-23 PROBLEM — R06.00 DYSPNEA: Status: ACTIVE | Noted: 2017-08-23

## 2017-08-23 PROBLEM — D64.9 SYMPTOMATIC ANEMIA: Status: ACTIVE | Noted: 2017-08-23

## 2017-08-23 LAB
ALBUMIN SERPL-MCNC: 3.4 G/DL (ref 3.5–5)
ALBUMIN/GLOB SERPL: 0.9 {RATIO} (ref 1.1–2.2)
ALP SERPL-CCNC: 69 U/L (ref 45–117)
ALT SERPL-CCNC: 18 U/L (ref 12–78)
ANION GAP SERPL CALC-SCNC: 8 MMOL/L (ref 5–15)
APTT PPP: 25.9 SEC (ref 22.1–32.5)
AST SERPL-CCNC: 11 U/L (ref 15–37)
ATRIAL RATE: 81 BPM
BASOPHILS # BLD: 0 K/UL (ref 0–0.1)
BASOPHILS NFR BLD: 1 % (ref 0–1)
BILIRUB SERPL-MCNC: 0.2 MG/DL (ref 0.2–1)
BUN SERPL-MCNC: 22 MG/DL (ref 8–27)
BUN SERPL-MCNC: 24 MG/DL (ref 6–20)
BUN/CREAT SERPL: 22 (ref 12–20)
BUN/CREAT SERPL: 25 (ref 12–28)
CALCIUM SERPL-MCNC: 9.2 MG/DL (ref 8.5–10.1)
CALCIUM SERPL-MCNC: 9.2 MG/DL (ref 8.7–10.3)
CALCULATED P AXIS, ECG09: 20 DEGREES
CALCULATED R AXIS, ECG10: 18 DEGREES
CALCULATED T AXIS, ECG11: 42 DEGREES
CHLORIDE SERPL-SCNC: 104 MMOL/L (ref 96–106)
CHLORIDE SERPL-SCNC: 107 MMOL/L (ref 97–108)
CO2 SERPL-SCNC: 23 MMOL/L (ref 18–29)
CO2 SERPL-SCNC: 30 MMOL/L (ref 21–32)
CREAT SERPL-MCNC: 0.88 MG/DL (ref 0.57–1)
CREAT SERPL-MCNC: 1.07 MG/DL (ref 0.55–1.02)
DIAGNOSIS, 93000: NORMAL
EOSINOPHIL # BLD: 0.1 K/UL (ref 0–0.4)
EOSINOPHIL NFR BLD: 2 % (ref 0–7)
ERYTHROCYTE [DISTWIDTH] IN BLOOD BY AUTOMATED COUNT: 16.7 % (ref 11.5–14.5)
ERYTHROCYTE [DISTWIDTH] IN BLOOD BY AUTOMATED COUNT: 17.4 % (ref 12.3–15.4)
FERRITIN SERPL-MCNC: 3 NG/ML (ref 8–252)
FOLATE SERPL-MCNC: 36.8 NG/ML (ref 5–21)
GLOBULIN SER CALC-MCNC: 3.7 G/DL (ref 2–4)
GLUCOSE SERPL-MCNC: 139 MG/DL (ref 65–100)
GLUCOSE SERPL-MCNC: 74 MG/DL (ref 65–99)
HAPTOGLOB SERPL-MCNC: 227 MG/DL (ref 30–200)
HCT VFR BLD AUTO: 23.5 % (ref 34–46.6)
HCT VFR BLD AUTO: 23.9 % (ref 35–47)
HEMOCCULT STL QL: POSITIVE
HGB BLD-MCNC: 7 G/DL (ref 11.5–16)
HGB BLD-MCNC: 7.2 G/DL (ref 11.1–15.9)
INR PPP: 1.1 (ref 0.9–1.1)
IRON SATN MFR SERPL: 4 % (ref 20–50)
IRON SERPL-MCNC: 15 UG/DL (ref 35–150)
LDH SERPL L TO P-CCNC: 170 U/L (ref 81–246)
LYMPHOCYTES # BLD: 1.4 K/UL (ref 0.8–3.5)
LYMPHOCYTES NFR BLD: 19 % (ref 12–49)
MCH RBC QN AUTO: 25.9 PG (ref 26–34)
MCH RBC QN AUTO: 26.7 PG (ref 26.6–33)
MCHC RBC AUTO-ENTMCNC: 29.3 G/DL (ref 30–36.5)
MCHC RBC AUTO-ENTMCNC: 30.6 G/DL (ref 31.5–35.7)
MCV RBC AUTO: 87 FL (ref 79–97)
MCV RBC AUTO: 88.5 FL (ref 80–99)
MONOCYTES # BLD: 0.6 K/UL (ref 0–1)
MONOCYTES NFR BLD: 8 % (ref 5–13)
NEUTS SEG # BLD: 5.2 K/UL (ref 1.8–8)
NEUTS SEG NFR BLD: 70 % (ref 32–75)
P-R INTERVAL, ECG05: 112 MS
PLATELET # BLD AUTO: 351 K/UL (ref 150–400)
PLATELET # BLD AUTO: 402 X10E3/UL (ref 150–379)
POTASSIUM SERPL-SCNC: 3.7 MMOL/L (ref 3.5–5.1)
POTASSIUM SERPL-SCNC: 4.3 MMOL/L (ref 3.5–5.2)
PROT SERPL-MCNC: 7.1 G/DL (ref 6.4–8.2)
PROTHROMBIN TIME: 11.4 SEC (ref 9–11.1)
Q-T INTERVAL, ECG07: 384 MS
QRS DURATION, ECG06: 70 MS
QTC CALCULATION (BEZET), ECG08: 446 MS
RBC # BLD AUTO: 2.7 M/UL (ref 3.8–5.2)
RBC # BLD AUTO: 2.7 X10E6/UL (ref 3.77–5.28)
SODIUM SERPL-SCNC: 144 MMOL/L (ref 134–144)
SODIUM SERPL-SCNC: 145 MMOL/L (ref 136–145)
THERAPEUTIC RANGE,PTTT: NORMAL SECS (ref 58–77)
TIBC SERPL-MCNC: 425 UG/DL (ref 250–450)
TROPONIN I SERPL-MCNC: <0.04 NG/ML
VENTRICULAR RATE, ECG03: 81 BPM
VIT B12 SERPL-MCNC: 395 PG/ML (ref 211–911)
WBC # BLD AUTO: 7.1 X10E3/UL (ref 3.4–10.8)
WBC # BLD AUTO: 7.3 K/UL (ref 3.6–11)

## 2017-08-23 PROCEDURE — 80053 COMPREHEN METABOLIC PANEL: CPT | Performed by: EMERGENCY MEDICINE

## 2017-08-23 PROCEDURE — 85025 COMPLETE CBC W/AUTO DIFF WBC: CPT | Performed by: EMERGENCY MEDICINE

## 2017-08-23 PROCEDURE — 74011000258 HC RX REV CODE- 258: Performed by: INTERNAL MEDICINE

## 2017-08-23 PROCEDURE — 30233N1 TRANSFUSION OF NONAUTOLOGOUS RED BLOOD CELLS INTO PERIPHERAL VEIN, PERCUTANEOUS APPROACH: ICD-10-PCS | Performed by: FAMILY MEDICINE

## 2017-08-23 PROCEDURE — 83010 ASSAY OF HAPTOGLOBIN QUANT: CPT | Performed by: INTERNAL MEDICINE

## 2017-08-23 PROCEDURE — 74011250637 HC RX REV CODE- 250/637: Performed by: INTERNAL MEDICINE

## 2017-08-23 PROCEDURE — 93005 ELECTROCARDIOGRAM TRACING: CPT

## 2017-08-23 PROCEDURE — 83540 ASSAY OF IRON: CPT | Performed by: INTERNAL MEDICINE

## 2017-08-23 PROCEDURE — 74011250636 HC RX REV CODE- 250/636: Performed by: INTERNAL MEDICINE

## 2017-08-23 PROCEDURE — 82272 OCCULT BLD FECES 1-3 TESTS: CPT | Performed by: INTERNAL MEDICINE

## 2017-08-23 PROCEDURE — 77030029131 HC ADMN ST IV BLD N DEHP ICUM -B

## 2017-08-23 PROCEDURE — 85730 THROMBOPLASTIN TIME PARTIAL: CPT | Performed by: EMERGENCY MEDICINE

## 2017-08-23 PROCEDURE — 86923 COMPATIBILITY TEST ELECTRIC: CPT | Performed by: EMERGENCY MEDICINE

## 2017-08-23 PROCEDURE — 96374 THER/PROPH/DIAG INJ IV PUSH: CPT

## 2017-08-23 PROCEDURE — 82746 ASSAY OF FOLIC ACID SERUM: CPT | Performed by: INTERNAL MEDICINE

## 2017-08-23 PROCEDURE — 96361 HYDRATE IV INFUSION ADD-ON: CPT

## 2017-08-23 PROCEDURE — 65660000000 HC RM CCU STEPDOWN

## 2017-08-23 PROCEDURE — P9016 RBC LEUKOCYTES REDUCED: HCPCS | Performed by: EMERGENCY MEDICINE

## 2017-08-23 PROCEDURE — 82607 VITAMIN B-12: CPT | Performed by: INTERNAL MEDICINE

## 2017-08-23 PROCEDURE — P9040 RBC LEUKOREDUCED IRRADIATED: HCPCS | Performed by: EMERGENCY MEDICINE

## 2017-08-23 PROCEDURE — 65270000029 HC RM PRIVATE

## 2017-08-23 PROCEDURE — 36430 TRANSFUSION BLD/BLD COMPNT: CPT

## 2017-08-23 PROCEDURE — 36415 COLL VENOUS BLD VENIPUNCTURE: CPT | Performed by: EMERGENCY MEDICINE

## 2017-08-23 PROCEDURE — 82728 ASSAY OF FERRITIN: CPT | Performed by: EMERGENCY MEDICINE

## 2017-08-23 PROCEDURE — C9113 INJ PANTOPRAZOLE SODIUM, VIA: HCPCS | Performed by: EMERGENCY MEDICINE

## 2017-08-23 PROCEDURE — 99285 EMERGENCY DEPT VISIT HI MDM: CPT

## 2017-08-23 PROCEDURE — 86900 BLOOD TYPING SEROLOGIC ABO: CPT | Performed by: EMERGENCY MEDICINE

## 2017-08-23 PROCEDURE — 74011250636 HC RX REV CODE- 250/636: Performed by: EMERGENCY MEDICINE

## 2017-08-23 PROCEDURE — 83615 LACTATE (LD) (LDH) ENZYME: CPT | Performed by: INTERNAL MEDICINE

## 2017-08-23 PROCEDURE — 71010 XR CHEST PORT: CPT

## 2017-08-23 PROCEDURE — 85610 PROTHROMBIN TIME: CPT | Performed by: EMERGENCY MEDICINE

## 2017-08-23 PROCEDURE — 84484 ASSAY OF TROPONIN QUANT: CPT | Performed by: EMERGENCY MEDICINE

## 2017-08-23 RX ORDER — ACETAMINOPHEN 325 MG/1
650 TABLET ORAL
Status: DISCONTINUED | OUTPATIENT
Start: 2017-08-23 | End: 2017-08-24 | Stop reason: HOSPADM

## 2017-08-23 RX ORDER — SODIUM CHLORIDE AND POTASSIUM CHLORIDE .9; .15 G/100ML; G/100ML
SOLUTION INTRAVENOUS CONTINUOUS
Status: DISCONTINUED | OUTPATIENT
Start: 2017-08-23 | End: 2017-08-24 | Stop reason: HOSPADM

## 2017-08-23 RX ORDER — SODIUM CHLORIDE 9 MG/ML
250 INJECTION, SOLUTION INTRAVENOUS AS NEEDED
Status: DISCONTINUED | OUTPATIENT
Start: 2017-08-23 | End: 2017-08-23

## 2017-08-23 RX ORDER — LEVOTHYROXINE SODIUM 88 UG/1
88 TABLET ORAL
Status: DISCONTINUED | OUTPATIENT
Start: 2017-08-24 | End: 2017-08-24

## 2017-08-23 RX ORDER — HYDROCODONE BITARTRATE AND ACETAMINOPHEN 5; 325 MG/1; MG/1
1 TABLET ORAL
Status: DISCONTINUED | OUTPATIENT
Start: 2017-08-23 | End: 2017-08-24 | Stop reason: HOSPADM

## 2017-08-23 RX ORDER — HYDROMORPHONE HYDROCHLORIDE 1 MG/ML
0.5 INJECTION, SOLUTION INTRAMUSCULAR; INTRAVENOUS; SUBCUTANEOUS
Status: DISCONTINUED | OUTPATIENT
Start: 2017-08-23 | End: 2017-08-24 | Stop reason: HOSPADM

## 2017-08-23 RX ORDER — NALOXONE HYDROCHLORIDE 0.4 MG/ML
0.4 INJECTION, SOLUTION INTRAMUSCULAR; INTRAVENOUS; SUBCUTANEOUS AS NEEDED
Status: DISCONTINUED | OUTPATIENT
Start: 2017-08-23 | End: 2017-08-24 | Stop reason: HOSPADM

## 2017-08-23 RX ORDER — DIPHENHYDRAMINE HYDROCHLORIDE 50 MG/ML
12.5 INJECTION, SOLUTION INTRAMUSCULAR; INTRAVENOUS
Status: DISCONTINUED | OUTPATIENT
Start: 2017-08-23 | End: 2017-08-24 | Stop reason: HOSPADM

## 2017-08-23 RX ORDER — SODIUM CHLORIDE 0.9 % (FLUSH) 0.9 %
5-10 SYRINGE (ML) INJECTION AS NEEDED
Status: DISCONTINUED | OUTPATIENT
Start: 2017-08-23 | End: 2017-08-24 | Stop reason: HOSPADM

## 2017-08-23 RX ORDER — PANTOPRAZOLE SODIUM 40 MG/10ML
40 INJECTION, POWDER, LYOPHILIZED, FOR SOLUTION INTRAVENOUS
Status: COMPLETED | OUTPATIENT
Start: 2017-08-23 | End: 2017-08-23

## 2017-08-23 RX ORDER — DOCUSATE SODIUM 100 MG/1
100 CAPSULE, LIQUID FILLED ORAL 2 TIMES DAILY
Status: DISCONTINUED | OUTPATIENT
Start: 2017-08-23 | End: 2017-08-24 | Stop reason: HOSPADM

## 2017-08-23 RX ORDER — IBUPROFEN 200 MG
1 TABLET ORAL EVERY 24 HOURS
Status: DISCONTINUED | OUTPATIENT
Start: 2017-08-23 | End: 2017-08-24 | Stop reason: HOSPADM

## 2017-08-23 RX ORDER — SODIUM CHLORIDE 0.9 % (FLUSH) 0.9 %
5-10 SYRINGE (ML) INJECTION EVERY 8 HOURS
Status: DISCONTINUED | OUTPATIENT
Start: 2017-08-23 | End: 2017-08-24 | Stop reason: HOSPADM

## 2017-08-23 RX ORDER — METOPROLOL TARTRATE 25 MG/1
25 TABLET, FILM COATED ORAL 2 TIMES DAILY
Status: DISCONTINUED | OUTPATIENT
Start: 2017-08-23 | End: 2017-08-24 | Stop reason: HOSPADM

## 2017-08-23 RX ORDER — ONDANSETRON 2 MG/ML
4 INJECTION INTRAMUSCULAR; INTRAVENOUS
Status: DISCONTINUED | OUTPATIENT
Start: 2017-08-23 | End: 2017-08-24 | Stop reason: HOSPADM

## 2017-08-23 RX ADMIN — PANTOPRAZOLE SODIUM 40 MG: 40 INJECTION, POWDER, FOR SOLUTION INTRAVENOUS at 10:21

## 2017-08-23 RX ADMIN — SODIUM CHLORIDE 250 ML: 900 INJECTION, SOLUTION INTRAVENOUS at 12:15

## 2017-08-23 RX ADMIN — METOPROLOL TARTRATE 25 MG: 25 TABLET ORAL at 20:48

## 2017-08-23 RX ADMIN — IRON SUCROSE 200 MG: 20 INJECTION, SOLUTION INTRAVENOUS at 20:49

## 2017-08-23 RX ADMIN — Medication 10 ML: at 22:00

## 2017-08-23 RX ADMIN — SODIUM CHLORIDE 1000 ML: 900 INJECTION, SOLUTION INTRAVENOUS at 10:21

## 2017-08-23 RX ADMIN — SODIUM CHLORIDE AND POTASSIUM CHLORIDE: 9; 1.49 INJECTION, SOLUTION INTRAVENOUS at 13:02

## 2017-08-23 RX ADMIN — SODIUM CHLORIDE AND POTASSIUM CHLORIDE: 9; 1.49 INJECTION, SOLUTION INTRAVENOUS at 20:48

## 2017-08-23 NOTE — ED NOTES
Dr. Karen Blake at bedside with pt. Family is not in room. Call bell in lap. Was attempting to place 2nd IV line. Will check for site when pt is available.

## 2017-08-23 NOTE — ED NOTES
Bedside and Verbal shift change report given to Kaleb Moreira RN (oncoming nurse) by Jie Gutierrez RN (offgoing nurse). Report included the following information SBAR, ED Summary, MAR and Recent Results.

## 2017-08-23 NOTE — IP AVS SNAPSHOT
[address] [place] [address] 
[phone] Patient: Lisa Solo MRN: [id number] ALT:2/[birth year] You are allergic to the following No active allergies Recent Documentation Height Weight BMI OB Status Smoking Status 1.651 m 68.9 kg 25.29 kg/m2 Postmenopausal Current Every Day Smoker Unresulted Labs Order Current Status TYPE + CROSSMATCH Preliminary result Emergency Contacts Name Discharge Info Relation Home Work Mobile [address] [facility] CAREGIVER [3] Child [2] [phone] About your hospitalization You were admitted on:  August 23, 2017 You last received care in the:  SF 4M POST SURG ORT 2 You were discharged on:  August 24, 2017 Unit phone number:  [phone] Why you were hospitalized Your primary diagnosis was:  Acute Blood Loss Anemia Your diagnoses also included:  Symptomatic Anemia, Dyspnea, Fatigue, Paroxysmal A-Fib (Hcc), Hypothyroid Providers Seen During Your Hospitalizations Provider Role Specialty Primary office phone Trever Wilson MD Attending Provider Emergency Medicine [phone] Johanna Brown MD Attending Provider [facility] [phone] Your Primary Care Physician (PCP) Primary Care Physician Office Phone Office Fax Romie Borges [phone] Follow-up Information Follow up With Details Comments Contact Info Connie Westfall MD On 9/8/2017 1pm [id number] [facility] [place] [phone] 100[address] [facility] 
[phone] Bakari Downs NP   [address] [facility] 
[phone] Your Appointments Friday September 08, 2017  1:00 PM EDT  
ESTABLISHED PATIENT with Connie Westfall MD  
[facility] [address] [facility] [place] [address] [facility]  
[phone] Current Discharge Medication List  
  
CONTINUE these medications which have NOT CHANGED Dose & Instructions Dispensing Information Comments Morning Noon Evening Bedtime  
 levothyroxine 88 mcg tablet Commonly known as:  SYNTHROID Your last dose was: Your next dose is:    
   
   
 Dose:  88 mcg Take 88 mcg by mouth Daily (before breakfast). Refills:  0  
     
   
   
   
  
 metoprolol tartrate 50 mg tablet Commonly known as:  LOPRESSOR Your last dose was: Your next dose is:    
   
   
 Dose:  50 mg Take 1 Tab by mouth two (2) times a day. Quantity:  180 Tab Refills:  3 STOP taking these medications   
 aspirin delayed-release 81 mg tablet  
   
  
 rivaroxaban 20 mg Tab tablet Commonly known as:  Ollen Javier Discharge Instructions Patient Discharge Instructions Titus Lebronfoot / [id number] : 1945 Admitted 2017 Discharged: 2017 12:09 PM  
 
ACUTE DIAGNOSES: 
Acute blood loss anemia CHRONIC MEDICAL DIAGNOSES: 
Problem List as of 2017  Date Reviewed: 2017 Codes Class Noted - Resolved * (Principal)Acute blood loss anemia ICD-10-CM: D62 
ICD-9-CM: 285.1  2017 - Present Symptomatic anemia ICD-10-CM: D64.9 ICD-9-CM: 285.9  2017 - Present Dyspnea ICD-10-CM: R06.00 
ICD-9-CM: 786.09  2017 - Present Fatigue ICD-10-CM: R53.83 ICD-9-CM: 780.79  2017 - Present Hypothyroid (Chronic) ICD-10-CM: E03.9 ICD-9-CM: 244.9  2017 - Present Cigarette nicotine dependence without complication [facility]-27-KB: D90.296 ICD-9-CM: 305.1  6/10/2017 - Present Dental infection ICD-10-CM: K04.7 ICD-9-CM: 522.4  2017 - Present Calculus of gallbladder with chronic cholecystitis without obstruction ICD-10-CM: K80.10 ICD-9-CM: 574.10  8/15/2016 - Present S/P ablation of atrial fibrillation ICD-10-CM: Z98.890, Z86.79 
ICD-9-CM: V45.89  8/15/2016 - Present Hashimoto's thyroiditis ICD-10-CM: E06.3 ICD-9-CM: 245.2  8/15/2016 - Present Irritable bowel syndrome with diarrhea ICD-10-CM: K58.0 ICD-9-CM: 564.1  8/15/2016 - Present Paroxysmal a-fib (HCC) ICD-10-CM: I48.0 ICD-9-CM: 427.31  9/24/2015 - Present RESOLVED: Atrial fibrillation with rapid ventricular response (HCC) ICD-10-CM: I48.91 
ICD-9-CM: 427.31  5/20/2017 - 6/10/2017 RESOLVED: Cataract ICD-10-CM: H26.9 ICD-9-CM: 366.9  8/19/2014 - 8/19/2014 DISCHARGE MEDICATIONS:  
 
 
 
· It is important that you take the medication exactly as they are prescribed. · Keep your medication in the bottles provided by the pharmacist and keep a list of the medication names, dosages, and times to be taken in your wallet. · Do not take other medications without consulting your doctor. DIET:  Regular Diet ACTIVITY: Activity as tolerated ADDITIONAL INFORMATION: If you experience any of the following symptoms then please call your primary care physician or return to the emergency room if you cannot get hold of your doctor: Fever, chills, nausea, vomiting, diarrhea, change in mentation, falling, bleeding, shortness of breath. FOLLOW UP CARE: 
Dr. Yasmine Loyola NP  you are to call and set up an appointment to see them with in 1 week. Follow-up with specialists at directed by them Information obtained by : 
I understand that if any problems occur once I am at home I am to contact my physician. I understand and acknowledge receipt of the instructions indicated above. Physician's or R.N.'s Signature                                                                  Date/Time Patient or Representative Signature                                                          Date/Time Follow up with Mickey Whitney MD on September 8th, 2017 at [address] [place] [address] [address] 
[phone] Discharge Orders None [employer] Announcement We are excited to announce that we are making your provider's discharge notes available to you in [employer]. You will see these notes when they are completed and signed by the physician that discharged you from your recent hospital stay. If you have any questions or concerns about any information you see in Transerv, please call the Health Information Department where you were seen or reach out to your Primary Care Provider for more information about your plan of care. Introducing [facility]! [facility] introduces Transerv patient portal. Now you can access parts of your medical record, email your doctor's office, and request medication refills online. 1. In your internet browser, go to [email or web address] 2. Click on the First Time User? Click Here link in the Sign In box. You will see the New Member Sign Up page. 3. Enter your Transerv Access Code exactly as it appears below. You will not need to use this code after youve completed the sign-up process. If you do not sign up before the expiration date, you must request a new code. · Transerv Access Code: [email or web address] Expires: 9/27/2017  3:03 PM 
 
4. Enter the last four digits of your Social Security Number (xxxx) and Date of Birth (mm/dd/yyyy) as indicated and click Submit. You will be taken to the next sign-up page. 5. Create a Transerv ID. This will be your Transerv login ID and cannot be changed, so think of one that is secure and easy to remember. 6. Create a [employer] password. You can change your password at any time. 7. Enter your Password Reset Question and Answer. This can be used at a later time if you forget your password. 8. Enter your e-mail address. You will receive e-mail notification when new information is available in [address] 9. Click Sign Up. You can now view and download portions of your medical record. 10. Click the Download Summary menu link to download a portable copy of your medical information. If you have questions, please visit the Frequently Asked Questions section of the [employer] website. Remember, [employer] is NOT to be used for urgent needs. For medical emergencies, dial 911. Now available from your iPhone and Android! General Information Please provide this summary of care documentation to your next provider. Patient Signature:  ____________________________________________________________ Date:  ____________________________________________________________  
  
[facility] Provider Signature:  ____________________________________________________________ Date:  ____________________________________________________________

## 2017-08-23 NOTE — CONSULTS
Katia Pichardo MD, 5 [facility] [phone]    Date of  Admission: 8/23/2017  9:45 AM         IMPRESSION and RECOMMENDATIONS     1. PAF:  Now with recurrent GIB on xeralto. Would stop this as well as ASA until source of bleeding can be identified and treated. If this is not possible, then perhaps a Watchman  Device would be helpful. Continue metoprolol. CHADS-VASc = 2 (2.2% CVA risk/yr). I have discussed this plan with the patient. She appears to understand this plan and wishes to proceed ahead. Problem List  Date Reviewed: 8/23/2017          Codes Class Noted    * (Principal)Acute blood loss anemia ICD-10-CM: D62  ICD-9-CM: 285.1  8/23/2017        Symptomatic anemia ICD-10-CM: D64.9  ICD-9-CM: 285.9  8/23/2017        Dyspnea ICD-10-CM: R06.00  ICD-9-CM: 786.09  8/23/2017        Fatigue ICD-10-CM: R53.83  ICD-9-CM: 780.79  8/23/2017        Hypothyroid (Chronic) ICD-10-CM: E03.9  ICD-9-CM: 244.9  8/23/2017        Cigarette nicotine dependence without complication LMB-55-MZ: W67.438  ICD-9-CM: 305.1  6/10/2017        Dental infection ICD-10-CM: K04.7  ICD-9-CM: 522.4  5/20/2017        Calculus of gallbladder with chronic cholecystitis without obstruction ICD-10-CM: K80.10  ICD-9-CM: 574.10  8/15/2016        S/P ablation of atrial fibrillation ICD-10-CM: Z98.890, Z86.79  ICD-9-CM: V45.89  8/15/2016        Hashimoto's thyroiditis ICD-10-CM: E06.3  ICD-9-CM: 245.2  8/15/2016        Irritable bowel syndrome with diarrhea ICD-10-CM: K58.0  ICD-9-CM: 564.1  8/15/2016        Paroxysmal a-fib (HCC) ICD-10-CM: I48.0  ICD-9-CM: 427.31  9/24/2015              History of Present Illness:     Deronda Hodgkins is a 74 y.o. female with the above problem list who was admitted for Acute blood loss anemia. Pt presents with fatigue and dyspnea found to be due to anemia due to GIB.     She denies chest pain/discomfort, orthopnea, paroxysmal noctural dyspnea, lower extremity edema, palpitations, syncope, or near-syncope. Current Facility-Administered Medications   Medication Dose Route Frequency    [START ON 8/24/2017] levothyroxine (SYNTHROID) tablet 88 mcg  88 mcg Oral ACB    metoprolol tartrate (LOPRESSOR) tablet 25 mg  25 mg Oral BID    0.9% sodium chloride with KCl 20 mEq/L infusion   IntraVENous CONTINUOUS    sodium chloride (NS) flush 5-10 mL  5-10 mL IntraVENous Q8H    sodium chloride (NS) flush 5-10 mL  5-10 mL IntraVENous PRN    acetaminophen (TYLENOL) tablet 650 mg  650 mg Oral Q4H PRN    HYDROcodone-acetaminophen (NORCO) 5-325 mg per tablet 1 Tab  1 Tab Oral Q4H PRN    HYDROmorphone (PF) (DILAUDID) injection 0.5 mg  0.5 mg IntraVENous Q4H PRN    naloxone (NARCAN) injection 0.4 mg  0.4 mg IntraVENous PRN    diphenhydrAMINE (BENADRYL) injection 12.5 mg  12.5 mg IntraVENous Q4H PRN    ondansetron (ZOFRAN) injection 4 mg  4 mg IntraVENous Q6H PRN    docusate sodium (COLACE) capsule 100 mg  100 mg Oral BID    nicotine (NICODERM CQ) 21 mg/24 hr patch 1 Patch  1 Patch TransDERmal Q24H    iron sucrose (VENOFER) 200 mg in 0.9% sodium chloride 100 mL IVPB  200 mg IntraVENous Q24H     Current Outpatient Prescriptions   Medication Sig    metoprolol tartrate (LOPRESSOR) 50 mg tablet Take 1 Tab by mouth two (2) times a day.  rivaroxaban (XARELTO) 20 mg tab tablet Take 1 Tab by mouth daily (with dinner).  levothyroxine (SYNTHROID) 88 mcg tablet Take 88 mcg by mouth Daily (before breakfast).  aspirin delayed-release 81 mg tablet Take 81 mg by mouth daily. Last dose per dr Christel Osgood      No Known Allergies   Family History   Problem Relation Age of Onset    Heart Disease Father     Cancer Paternal Aunt       Social History     Social History    Marital status: SINGLE     Spouse name: N/A    Number of children: N/A    Years of education: N/A     Occupational History    Not on file.      Social History Main Topics    Smoking status: Current Every Day Smoker     Packs/day: 1.00     Years: 50.00    Smokeless tobacco: Never Used    Alcohol use Yes      Comment: social    Drug use: No    Sexual activity: Not Currently     Partners: Male     Other Topics Concern    Not on file     Social History Narrative       Physical Exam:     Patient Vitals for the past 16 hrs:   BP Temp Pulse Resp SpO2 Height Weight   08/23/17 1436 104/59 98.7 °F (37.1 °C) 74 15 96 % - -   08/23/17 1350 (!) 118/101 - 74 (!) 31 100 % - -   08/23/17 1340 139/74 - 74 18 - - -   08/23/17 1335 134/84 98.3 °F (36.8 °C) 78 21 100 % - -   08/23/17 1331 (!) 114/99 - 78 18 100 % - -   08/23/17 1325 140/74 - 76 19 - - -   08/23/17 1320 134/60 98.1 °F (36.7 °C) 72 17 100 % - -   08/23/17 1315 111/62 - 74 16 91 % - -   08/23/17 1310 99/40 - 74 19 100 % - -   08/23/17 1305 (!) 96/37 97.9 °F (36.6 °C) 72 18 100 % - -   08/23/17 1250 99/45 98.2 °F (36.8 °C) 73 22 100 % - -   08/23/17 1245 105/49 98 °F (36.7 °C) 75 15 92 % - -   08/23/17 1240 90/45 97.9 °F (36.6 °C) 76 17 100 % - -   08/23/17 1229 (!) 85/38 97.8 °F (36.6 °C) 77 16 100 % - -   08/23/17 1215 108/44 98.2 °F (36.8 °C) 74 15 100 % - -   08/23/17 1130 123/55 - 78 21 - - -   08/23/17 1100 110/63 - 73 16 100 % - -   08/23/17 0942 104/64 98.3 °F (36.8 °C) 88 16 97 % 5' 5\" (1.651 m) 152 lb (68.9 kg)       HEENT Exam:     Normocephalic, atraumatic. EOMI. Oropharynx negative. Neck supple. No lymphadenopathy. Lung Exam:     The patient is not dyspneic. There is no cough. Breath sounds are heard equally in all lung fields. There are no wheezes, rales, rhonchi, or rubs heard on auscultation. Heart Exam:     The rhythm is regular. The PMI is in the 5th intercostal space of the MCL. Apical impulse is normal. S1 is regular. S2 is physiologic. There is no S3, S4 gallop, murmur, click, or rub. Abdomen Exam:     Bowel sounds are normoactive. Abdomen soft in all quadrants. No tenderness.  No palpable masses. No organomegaly. No hernias noted. No bruits or pulsatile mass. Extremities Exam:     The extremities are atraumatic appearing. There is no clubbing, cyanosis, edema, ulcers, varicose veins, rash, erythemia noted in the extremities. The neurovascular status is grossly intact with normal distal sensation and pulses. Vascular Exam:     The radial, brachial, dorsalis pedis, posterior tibial, are equal and strong bilaterally The carotids are equal bilaterally without bruits. Labs:     Lab Results   Component Value Date/Time    Glucose 139 08/23/2017 10:12 AM    Sodium 145 08/23/2017 10:12 AM    Potassium 3.7 08/23/2017 10:12 AM    Chloride 107 08/23/2017 10:12 AM    CO2 30 08/23/2017 10:12 AM    BUN 24 08/23/2017 10:12 AM    Creatinine 1.07 08/23/2017 10:12 AM    Calcium 9.2 08/23/2017 10:12 AM     Recent Labs      08/23/17   1012  08/22/17   1657   WBC  7.3  7.1   HGB  7.0*  7.2*   HCT  23.9*  23.5*   PLT  351  402*     Recent Labs      08/23/17   1012   SGOT  11*   ALT  18   AP  69   TBILI  0.2   TP  7.1   ALB  3.4*   GLOB  3.7     Recent Labs      08/23/17   1012   INR  1.1   PTP  11.4*   APTT  25.9      No results for input(s): CPK, CKMB, TNIPOC in the last 72 hours. No lab exists for component: TROPONINI, ITNL  Recent Labs      08/23/17   1012   TROIQ  <0.04     No results found for: CHOL, CHOLX, CHLST, CHOLV, HDL, LDL, LDLC, DLDLP, TGLX, TRIGL, TRIGP, CHHD, CHHDX    EKG:  NSR on tele.

## 2017-08-23 NOTE — ED TRIAGE NOTES
Pt complains weak, tired, chest palpitations and SOB for a few weeks. Pt saw Dr. Author Dave yesterday and pt was called this morning for a hgb of 7.7 from [facility]. Pt's last dose of xarelto was last night.

## 2017-08-23 NOTE — CONSULTS
Manuel Jauregui. Senia Amezquita MD  [phone] office  [phone] voicemail   Gastroenterology Consultation Note      Admit Date: 8/23/2017  Consult Date: 8/23/2017   I greatly appreciate your asking me to see Milan Peter, thank you very much for the opportunity to participate in her care. Narrative Assessment and Plan   · Anemia, iron deficient, with etiology likely chronic GI blood loss  · AFib  · Anticoagulation    She was admitted to facility in [place] with this exact presentation, required blood transfusion, had extensive GI evaluation including:  EGD, Colonoscopy, Pillcam, and even push enteroscopy    AVM with blood loss was final diagnosis, but after the above attempts to ablate AVM she returned to anticoagulation -- and has returned to anemia and is requiring additional blood transfusion. I am happy to repeat her GI evaluation but we've conducted this experiment once and this is the result. I suggest consideration for an alternate approach: that being discontinuation of her anticoagulation. I recognize this exposes her to risk for CVA, but even with anticoagulation that risk is not zero, and the risk of GI bleeding remains. Could she have closure of JOSE? Is her CHADs score low enough that aspirin alone would suffice? Is there medical or EP approach to control AF to minimize CVA risk? I've asked for cardiology to weigh in. I do not think she requires inpatient care for long. I will arrange outpatient EGD and colonoscopy and will arrange repeat pillcam therafter. I suggest if hgb responds appropriately to transfusion she be discharged tomorrow OFF anticoagulation until repeat endoscopic testing can be accomplished. Subjective:     Chief Complaint: Anemia    History of Present Illness: Anemia located in blood discovered on routine evaluation. No overt GI blood loss. See above for summary of her previous evaluation in [place].     PCP:  Thong Donnelly, NP    Past Medical History:   Diagnosis Date    Anemia     Atrial fibrillation (HCC)     s/p pulm vein isolation 2015    Atrial fibrillation with rapid ventricular response ([address] 5/20/2017    Depression     Hypothyroidism         Past Surgical History:   Procedure Laterality Date    CARDIAC SURG PROCEDURE UNLIST      cardioversionx4    HX AFIB ABLATION  2016    HX CATARACT REMOVAL Left     HX CHOLECYSTECTOMY  08/26/2016    HX ORTHOPAEDIC Bilateral     arthroscopic knee surgery       Social History   Substance Use Topics    Smoking status: Current Every Day Smoker     Packs/day: 1.00     Years: 50.00    Smokeless tobacco: Never Used    Alcohol use Yes      Comment: social        Family History   Problem Relation Age of Onset    Heart Disease Father     Cancer Paternal Aunt         No Known Allergies         Home Medications:  Prior to Admission Medications   Prescriptions Last Dose Informant Patient Reported? Taking?   aspirin delayed-release 81 mg tablet 8/22/2017 at AM Other Yes Yes   Sig: Take 81 mg by mouth daily. Last dose per dr Summer Duron   levothyroxine (SYNTHROID) 88 mcg tablet 8/23/2017 at AM Other Yes Yes   Sig: Take 88 mcg by mouth Daily (before breakfast). metoprolol tartrate (LOPRESSOR) 50 mg tablet 8/22/2017 at AM Other No Yes   Sig: Take 1 Tab by mouth two (2) times a day. rivaroxaban (XARELTO) 20 mg tab tablet 8/22/2017 at PM Other No Yes   Sig: Take 1 Tab by mouth daily (with dinner).       Facility-Administered Medications: None       Hospital Medications:  Current Facility-Administered Medications   Medication Dose Route Frequency    [START ON 8/24/2017] levothyroxine (SYNTHROID) tablet 88 mcg  88 mcg Oral ACB    metoprolol tartrate (LOPRESSOR) tablet 25 mg  25 mg Oral BID    0.9% sodium chloride with KCl 20 mEq/L infusion   IntraVENous CONTINUOUS    sodium chloride (NS) flush 5-10 mL  5-10 mL IntraVENous Q8H    sodium chloride (NS) flush 5-10 mL  5-10 mL IntraVENous PRN    acetaminophen (TYLENOL) tablet 650 mg  650 mg Oral Q4H PRN    HYDROcodone-acetaminophen (NORCO) 5-325 mg per tablet 1 Tab  1 Tab Oral Q4H PRN    HYDROmorphone (PF) (DILAUDID) injection 0.5 mg  0.5 mg IntraVENous Q4H PRN    naloxone (NARCAN) injection 0.4 mg  0.4 mg IntraVENous PRN    diphenhydrAMINE (BENADRYL) injection 12.5 mg  12.5 mg IntraVENous Q4H PRN    ondansetron (ZOFRAN) injection 4 mg  4 mg IntraVENous Q6H PRN    docusate sodium (COLACE) capsule 100 mg  100 mg Oral BID    nicotine (NICODERM CQ) 21 mg/24 hr patch 1 Patch  1 Patch TransDERmal Q24H    iron sucrose (VENOFER) 200 mg in 0.9% sodium chloride 100 mL IVPB  200 mg IntraVENous Q24H     Current Outpatient Prescriptions   Medication Sig    metoprolol tartrate (LOPRESSOR) 50 mg tablet Take 1 Tab by mouth two (2) times a day.  rivaroxaban (XARELTO) 20 mg tab tablet Take 1 Tab by mouth daily (with dinner).  levothyroxine (SYNTHROID) 88 mcg tablet Take 88 mcg by mouth Daily (before breakfast).  aspirin delayed-release 81 mg tablet Take 81 mg by mouth daily.  Last dose per dr Carina Maxwell of Systems: Admission ROS by Neel Gilmore MD from 8/23/2017 were reviewed with the patient and changes (other than per HPI) include: none      Objective:     Physical Exam:  Visit Vitals    BP (!) 118/101    Pulse 74    Temp 98.3 °F (36.8 °C)    Resp (!) 31    Ht 5' 5\" (1.651 m)    Wt 68.9 kg (152 lb)    SpO2 100%    BMI 25.29 kg/m2     SpO2 Readings from Last 6 Encounters:   08/23/17 100%   08/22/17 98%   06/07/17 96%   06/05/17 98%   05/22/17 97%   03/26/17 100%          Intake/Output Summary (Last 24 hours) at 08/23/17 1404  Last data filed at 08/23/17 1235   Gross per 24 hour   Intake                0 ml   Output                0 ml   Net                0 ml      General: no distress, comfortable  Skin:  No rash or palpable dermatologic mass lesions  HEENT: Pupils equal, sclera anicteric, oropharynx with no gross lesions  Cardiovascular: No abnormal audible heart sounds, well perfused, no edema  Respiratory:  No abnormal audible breath sounds, normal respiratory effort, no throacic deformity  GI:   Abdomen nondistended, nontender, no mass, no free fluid, no rebound or guarding. Musculoskeletal:  No skeletal deformity nor acute arthritis noted.   Neurological:  Motor and sensory function intact in upper extremeties  Psychiatric:  Normal affect, memory intact, appears to have insight into current illness  Lymphatic:  No cervical, supraclavicular, or periumbilic lymphadenopathy    Laboratory:    Recent Results (from the past 24 hour(s))   CBC W/O DIFF    Collection Time: 08/22/17  4:57 PM   Result Value Ref Range    WBC 7.1 3.4 - 10.8 x10E3/uL    RBC 2.70 (LL) 3.77 - 5.28 x10E6/uL    HGB 7.2 (L) 11.1 - 15.9 g/dL    HCT 23.5 (L) 34.0 - 46.6 %    MCV 87 79 - 97 fL    MCH 26.7 26.6 - 33.0 pg    MCHC 30.6 (L) 31.5 - 35.7 g/dL    RDW 17.4 (H) 12.3 - 15.4 %    PLATELET 891 (H) 345 - 379 [id number]/RN   METABOLIC PANEL, BASIC    Collection Time: 08/22/17  4:57 PM   Result Value Ref Range    Glucose 74 65 - 99 mg/dL    BUN 22 8 - 27 mg/dL    Creatinine 0.88 0.57 - 1.00 mg/dL    GFR est non-AA 65 >59 mL/min/1.73    GFR est AA 75 >59 mL/min/1.73    BUN/Creatinine ratio 25 12 - 28    Sodium 144 134 - 144 mmol/L    Potassium 4.3 3.5 - 5.2 mmol/L    Chloride 104 96 - 106 mmol/L    CO2 23 18 - 29 mmol/L    Calcium 9.2 8.7 - 10.3 mg/dL   EKG, 12 LEAD, INITIAL    Collection Time: 08/23/17  9:47 AM   Result Value Ref Range    Ventricular Rate 81 BPM    Atrial Rate 81 BPM    P-R Interval 112 ms    QRS Duration 70 ms    Q-T Interval 384 ms    QTC Calculation (Bezet) 446 ms    Calculated P Axis 20 degrees    Calculated R Axis 18 degrees    Calculated T Axis 42 degrees    Diagnosis       Sinus rhythm with frequent premature ventricular complexes in a pattern of   bigeminy  Nonspecific ST abnormality  Abnormal ECG    Confirmed by Marcel Watson MD., Twin ([id number]) on 8/23/2017 12:45:59 PM     CBC WITH AUTOMATED DIFF    Collection Time: 08/23/17 10:12 AM   Result Value Ref Range    WBC 7.3 3.6 - 11.0 K/uL    RBC 2.70 (L) 3.80 - 5.20 M/uL    HGB 7.0 (L) 11.5 - 16.0 g/dL    HCT 23.9 (L) 35.0 - 47.0 %    MCV 88.5 80.0 - 99.0 FL    MCH 25.9 (L) 26.0 - 34.0 PG    MCHC 29.3 (L) 30.0 - 36.5 g/dL    RDW 16.7 (H) 11.5 - 14.5 %    PLATELET 788 499 - 487 K/uL    NEUTROPHILS 70 32 - 75 %    LYMPHOCYTES 19 12 - 49 %    MONOCYTES 8 5 - 13 %    EOSINOPHILS 2 0 - 7 %    BASOPHILS 1 0 - 1 %    ABS. NEUTROPHILS 5.2 1.8 - 8.0 K/UL    ABS. LYMPHOCYTES 1.4 0.8 - 3.5 K/UL    ABS. MONOCYTES 0.6 0.0 - 1.0 K/UL    ABS. EOSINOPHILS 0.1 0.0 - 0.4 K/UL    ABS. BASOPHILS 0.0 0.0 - 0.1 K/UL   METABOLIC PANEL, COMPREHENSIVE    Collection Time: 08/23/17 10:12 AM   Result Value Ref Range    Sodium 145 136 - 145 mmol/L    Potassium 3.7 3.5 - 5.1 mmol/L    Chloride 107 97 - 108 mmol/L    CO2 30 21 - 32 mmol/L    Anion gap 8 5 - 15 mmol/L    Glucose 139 (H) 65 - 100 mg/dL    BUN 24 (H) 6 - 20 MG/DL    Creatinine 1.07 (H) 0.55 - 1.02 MG/DL    BUN/Creatinine ratio 22 (H) 12 - 20      GFR est AA >60 >60 ml/min/1.73m2    GFR est non-AA 50 (L) >60 ml/min/1.73m2    Calcium 9.2 8.5 - 10.1 MG/DL    Bilirubin, total 0.2 0.2 - 1.0 MG/DL    ALT (SGPT) 18 12 - 78 U/L    AST (SGOT) 11 (L) 15 - 37 U/L    Alk.  phosphatase 69 45 - 117 U/L    Protein, total 7.1 6.4 - 8.2 g/dL    Albumin 3.4 (L) 3.5 - 5.0 g/dL    Globulin 3.7 2.0 - 4.0 g/dL    A-G Ratio 0.9 (L) 1.1 - 2.2     PTT    Collection Time: 08/23/17 10:12 AM   Result Value Ref Range    aPTT 25.9 22.1 - 32.5 sec    aPTT, therapeutic range     58.0 - 77.0 SECS   PROTHROMBIN TIME + INR    Collection Time: 08/23/17 10:12 AM   Result Value Ref Range    INR 1.1 0.9 - 1.1      Prothrombin time 11.4 (H) 9.0 - 11.1 sec   TROPONIN I    Collection Time: 08/23/17 10:12 AM   Result Value Ref Range    Troponin-I, Qt. <0.04 <0.05 ng/mL   FERRITIN    Collection Time: 08/23/17 10:12 AM   Result Value Ref Range Ferritin 3 (L) 8 - 252 NG/ML   TYPE + CROSSMATCH    Collection Time: 08/23/17 10:12 AM   Result Value Ref Range    Crossmatch Expiration 08/26/2017     ABO/Rh(D) O NEGATIVE     Antibody screen NEG     Unit number [id number]     Blood component type RC LRIR AS1     Unit division 00     Status of unit ISSUED     Crossmatch result Compatible     Unit number [id number]     Blood component type RC LRIR AS1     Unit division 00     Status of unit ALLOCATED     Crossmatch result Compatible    OCCULT BLOOD, STOOL    Collection Time: 08/23/17 10:45 AM   Result Value Ref Range    Occult blood, stool POSITIVE (A) NEG     LD    Collection Time: 08/23/17 11:43 AM   Result Value Ref Range     81 - 246 U/L         Assessment/Plan:     Principal Problem:    Acute blood loss anemia (8/23/2017)    Active Problems:    Paroxysmal a-fib (HCC) (9/24/2015)      Symptomatic anemia (8/23/2017)      Dyspnea (8/23/2017)      Fatigue (8/23/2017)      Hypothyroid (8/23/2017)         See above narrative for full detail.   =

## 2017-08-23 NOTE — H&P
Dre Purdy [facility] [place] [address]  [address] [facility], [address] [place] [address]  [phone]    Admission History and Physical      NAME:  Casandra Rice   :   1945   MRN:  [id number]     PCP:  Calvin Barron NP     Date/Time:  2017         Subjective:     CHIEF COMPLAINT: dyspnea, fatigue, low blood count     HISTORY OF PRESENT ILLNESS:     The patient is a 75 yo hx of Pafib, hypothyroid, presented w/ dyspnea, fatigue, acute anemia. The patient c/o fatigue for about 3 weeks. Denied chest pain, cough, fevers, chills, nausea, vomiting, diarrhea, melena, or BRBPR. She saw her Cardiologist today and was found to have a Hgb of 7.0 (last Hgb was 12.9 in May). No Known Allergies    Prior to Admission medications    Medication Sig Start Date End Date Taking? Authorizing Provider   metoprolol tartrate (LOPRESSOR) 50 mg tablet Take 1 Tab by mouth two (2) times a day. 6/10/17  Yes Mindi Chapa MD   rivaroxaban (XARELTO) 20 mg tab tablet Take 1 Tab by mouth daily (with dinner). 6/10/17  Yes Mindi Chapa MD   levothyroxine (SYNTHROID) 88 mcg tablet Take 88 mcg by mouth Daily (before breakfast). Yes Historical Provider   aspirin delayed-release 81 mg tablet Take 81 mg by mouth daily.  Last dose per dr Antoniette Sicard   Yes Historical Provider       Past Medical History:   Diagnosis Date    Anemia     Atrial fibrillation ([address]     s/p pulm vein isolation     Atrial fibrillation with rapid ventricular response ([address] 2017    Depression     Hypothyroidism         Past Surgical History:   Procedure Laterality Date    CARDIAC SURG PROCEDURE UNLIST      cardioversionx4    HX AFIB ABLATION  2016    HX CATARACT REMOVAL Left     HX CHOLECYSTECTOMY  2016    HX ORTHOPAEDIC Bilateral     arthroscopic knee surgery       Social History   Substance Use Topics    Smoking status: Current Every Day Smoker     Packs/day: 1.00     Years: 50.00    Smokeless tobacco: Never Used    Alcohol use Yes Comment: social        Family History   Problem Relation Age of Onset    Heart Disease Father     Cancer Paternal Aunt         Review of Systems:  (bold if positive, if negative)    Gen:   fatigueEyes:  ENT:  CVS:  Pulm:  , dyspnea,GI:    :    MS:  Skin:  Psych:  Endo:    Hem:  Renal:    Neuro:          Objective:      VITALS:    Vital signs reviewed; most recent are:    Visit Vitals    /64 (BP 1 Location: Left arm, BP Patient Position: Sitting)    Pulse 88    Temp 98.3 °F (36.8 °C)    Resp 16    Ht 5' 5\" (1.651 m)    Wt 68.9 kg (152 lb)    SpO2 97%    BMI 25.29 kg/m2     SpO2 Readings from Last 6 Encounters:   08/23/17 97%   08/22/17 98%   06/07/17 96%   06/05/17 98%   05/22/17 97%   03/26/17 100%        No intake or output data in the 24 hours ending 08/23/17 1150     Exam:     Physical Exam:    Gen:  Well-developed, well-nourished, in no acute distress  HEENT:  Pink conjunctivae, PERRL, hearing intact to voice, pale mucous membranes  Neck:  Supple, without masses, thyroid non-tender  Resp:  No accessory muscle use, clear breath sounds without wheezes rales or rhonchi  Card:  No murmurs, normal S1, S2 without thrills, bruits or peripheral edema  Abd:  Soft, non-tender, non-distended, normoactive bowel sounds are present  Lymph:  No cervical adenopathy  Musc:  No cyanosis or clubbing  Skin:  No rashes  Neuro:  Cranial nerves 3-12 are grossly intact, follows commands appropriately  Psych:  Alert with good insight. Oriented to person, place, and time    Labs:    Recent Labs      08/23/17   1012   WBC  7.3   HGB  7.0*   HCT  23.9*   PLT  351     Recent Labs      08/23/17   1012   NA  145   K  3.7   CL  107   CO2  30   GLU  139*   BUN  24*   CREA  1.07*   CA  9.2   ALB  3.4*   TBILI  0.2   SGOT  11*   ALT  18     No results found for: GLUCPOC  No results for input(s): PH, PCO2, PO2, HCO3, FIO2 in the last 72 hours.   Recent Labs      08/23/17   1012   INR  1.1       Radiology and EKG reviewed: pending    **Old Records reviewed in [facility]**       Assessment/Plan:       Principal Problem:    73 yo hx of Pafib, hypothyroid, presented w/ dyspnea, fatigue, acute anemia    1) Acute symptomatic anemia/Acute blood loss anemia: likely has a GI bleed from xarelto/ASA. Will check iron, ferritin, LDH, haptoglobin, stool blood. Transfuse 1u RBCs. Consult GI    2) Paroxysmal a-fib: in NSR. Will hold xarelto/ASA due to above. Cont metoprolol    3) Hypothyroid: check TSH, cont synthroid    4) Tobacco abuse: counseled on cessation. Start nicotine patch    Code: Full     Risk of deterioration: high      Total time spent with patient: 60 min                  Care Plan discussed with: Patient, family, nursing.   I signed out to Dr. Lin Najera, who will resume care of patient    Discussed:  Care Plan    Prophylaxis:  SCD's    Probable Disposition:  Home w/Family           ___________________________________________________    Attending Physician: Gamaliel Christopher MD

## 2017-08-23 NOTE — ED NOTES
Report received from [place], [facility]. Assumed care of pt. Bed locked and in low position with call bell within reach. Using AIDET-Introduced self as Primary RN and plan discussed with pt with understanding was verbalized. Pt denies additional complaints at this time. White board updated with this nurse's name. Patient advised that medical information will be discussed and it is their own responsibility to tell nurse if such conversation should not take place in the presence of visitors. Pt verbalizes understanding.

## 2017-08-23 NOTE — ED NOTES
Pt amb to restroom. Denies dizziness, no SOB, no chest pain. Gait is steady and no assistance appeared to be needed. Blood transfusion has finished. Assisted pt to adjust on stretcher for comfort. Turns self. Lights out per pt request, door closed. Call bell in lap. VSS.

## 2017-08-23 NOTE — ED PROVIDER NOTES
HPI Comments: 74 y.o. female with past medical history significant for hypothyroidism and A-fib with rapid ventricular response who presents from home with chief complaint of abnormal lab results. Per pt, she has been experiencing ongoing weakness, palpitations and SOB over the past three weeks which have gradually worsened. She adds that she has also experienced an itchy sensation all over, yet has not noticed any rash. The pt reports that she was seen by Dr. Ramon Walker yesterday (8/22/2017) where she received blood testing. Per pt, she was called by [facility] this morning and informed that she had a hgb reading of 7.2. The pt makes it known that she is currently taking xarelto with her last intake occurring yesterday. She notes that she has been on Xarelto for years however was taken off of it over a year ago after being evaluated and informed of a bleed. The pt states she has been taking Xarelto regularly for the past year now. When asked when the pt last received a colonoscopy she states about a year ago which appeared normal. The pt denies fever, chills, N/V/D, CP, abd pain, lightheadedness, dizziness, headache and urinary symptoms. There are no other acute medical concerns at this time. Social hx: Current daily smoker, Current ETOH use  PCP: Kathia Pereyra NP    Note written by Anya Mireles, as dictated by Brock Trujillo MD 10:02 AM          The history is provided by the patient.         Past Medical History:   Diagnosis Date    Atrial fibrillation [facility])     s/p pulm vein isolation 2015    Atrial fibrillation with rapid ventricular response ([address] 5/20/2017    Hypothyroidism        Past Surgical History:   Procedure Laterality Date    CARDIAC SURG PROCEDURE UNLIST      cardioversionx4    HX AFIB ABLATION  2016    HX CATARACT REMOVAL Left     HX CHOLECYSTECTOMY  08/26/2016    HX ORTHOPAEDIC Bilateral     arthroscopic knee surgery         Family History:   Problem Relation Age of Onset    Heart Disease Father     Cancer Paternal Aunt        Social History     Social History    Marital status: SINGLE     Spouse name: N/A    Number of children: N/A    Years of education: N/A     Occupational History    Not on file. Social History Main Topics    Smoking status: Current Every Day Smoker     Packs/day: 1.00     Years: 50.00    Smokeless tobacco: Never Used    Alcohol use Yes      Comment: social    Drug use: No    Sexual activity: Not Currently     Partners: Male     Other Topics Concern    Not on file     Social History Narrative         ALLERGIES: Review of patient's allergies indicates no known allergies. Review of Systems   Constitutional: Negative for activity change, appetite change and fatigue. HENT: Negative. Negative for ear pain, facial swelling, sore throat and trouble swallowing. Eyes: Negative. Negative for pain, discharge and visual disturbance. Respiratory: Positive for shortness of breath ( present over the past three weeks ). Negative for chest tightness and wheezing. Cardiovascular: Positive for palpitations (present over the past three weeks). Negative for chest pain. Gastrointestinal: Negative. Negative for abdominal pain, blood in stool, nausea and vomiting. Endocrine: Negative. Genitourinary: Negative. Negative for difficulty urinating, flank pain and hematuria. Musculoskeletal: Negative. Negative for arthralgias, joint swelling, myalgias and neck pain. Skin: Negative. Negative for color change and rash. Allergic/Immunologic: Negative. Neurological: Positive for weakness (generalized, over the past three weeks). Negative for dizziness, numbness and headaches. Hematological: Negative. Negative for adenopathy. Does not bruise/bleed easily. Psychiatric/Behavioral: Negative. Negative for behavioral problems, confusion and sleep disturbance. All other systems reviewed and are negative.       Vitals:    08/23/17 0942   BP: 104/64   Pulse: 88   Resp: 16   Temp: 98.3 °F (36.8 °C)   SpO2: 97%   Weight: 68.9 kg (152 lb)   Height: 5' 5\" (1.651 m)            Physical Exam   Constitutional: She is oriented to person, place, and time. She appears well-developed and well-nourished. No distress. Pt appears very pale   HENT:   Head: Normocephalic and atraumatic. Eyes: Conjunctivae are normal. No scleral icterus. Neck: Neck supple. No tracheal deviation present. Cardiovascular: Normal rate, regular rhythm, normal heart sounds and intact distal pulses. Exam reveals no gallop and no friction rub. No murmur heard. Pulmonary/Chest: Effort normal and breath sounds normal. She has no wheezes. She has no rales. Abdominal: Soft. She exhibits no distension. There is no tenderness. There is no rebound and no guarding. Genitourinary:   Genitourinary Comments: Brown stool    Musculoskeletal: She exhibits no edema. Neurological: She is alert and oriented to person, place, and time. Skin: Skin is warm and dry. No rash noted. Psychiatric: She has a normal mood and affect. Nursing note and vitals reviewed.      Note written by Anya Singleton, as dictated by Pino Alvarado MD 10:02 AM    MDM  Number of Diagnoses or Management Options     Amount and/or Complexity of Data Reviewed  Clinical lab tests: ordered and reviewed  Tests in the radiology section of CPT®: ordered and reviewed  Tests in the medicine section of CPT®: ordered and reviewed  Discussion of test results with the performing providers: yes  Obtain history from someone other than the patient: yes  Discuss the patient with other providers: yes    Risk of Complications, Morbidity, and/or Mortality  Presenting problems: high  Diagnostic procedures: high  Management options: high    Total critical care time spend exclusive of procedures: 32  minutes  ED Course       Procedures    PROGRESS NOTE:  10:59 AM     Pt appears to have brown stool upon rectal exam. Has been sent to lab for hemoccult. Her blood appears normocytic. PROGRESS NOTE:  11:01 AM     Assement and plan: pt has acute drop of hemoglobin at 7.0 from 09.9 of uncertain etiology. Results could be due to occult blood loss. Will transfuse and admit to hospitalist for further workup. CONSULT NOTE:  11:04 AM Daily Bettencourt MD spoke with Dr. Hal Guzman, Consult for Hospitalist.  Discussed available diagnostic tests and clinical findings. He is in agreement with care plans as outlined. Dr. Hal Guzman will see and admit the pt. ED EKG interpretation:  Rhythm: Sinus rhythm with frequent premature ventricular complexes.  Rate (approx.): 81 bpm; Axis: normal; ST/T wave: non-specific changes    Note written by Anya Sanford, as dictated by Daily Bettencourt MD 9:47 AM

## 2017-08-23 NOTE — PROGRESS NOTES
Significant drop in Hgb, patient very symptomatic - MARY/fatigue but no sx of active bleeding. On aspirin and Xarelto. Spoke with her regarding results - asked her to Fox Medrano NP today. Tried to call her office but cannot get thru voice menu. Can stop Xarelto and/or aspirin if needed.

## 2017-08-24 VITALS
OXYGEN SATURATION: 98 % | HEART RATE: 63 BPM | WEIGHT: 152 LBS | BODY MASS INDEX: 25.33 KG/M2 | DIASTOLIC BLOOD PRESSURE: 62 MMHG | RESPIRATION RATE: 16 BRPM | TEMPERATURE: 98.4 F | HEIGHT: 65 IN | SYSTOLIC BLOOD PRESSURE: 110 MMHG

## 2017-08-24 LAB
ALBUMIN SERPL-MCNC: 2.9 G/DL (ref 3.5–5)
ALBUMIN/GLOB SERPL: 0.9 {RATIO} (ref 1.1–2.2)
ALP SERPL-CCNC: 58 U/L (ref 45–117)
ALT SERPL-CCNC: 13 U/L (ref 12–78)
ANION GAP SERPL CALC-SCNC: 6 MMOL/L (ref 5–15)
AST SERPL-CCNC: 10 U/L (ref 15–37)
BILIRUB DIRECT SERPL-MCNC: 0.1 MG/DL (ref 0–0.2)
BILIRUB SERPL-MCNC: 0.3 MG/DL (ref 0.2–1)
BUN SERPL-MCNC: 22 MG/DL (ref 6–20)
BUN/CREAT SERPL: 24 (ref 12–20)
CALCIUM SERPL-MCNC: 8.6 MG/DL (ref 8.5–10.1)
CHLORIDE SERPL-SCNC: 110 MMOL/L (ref 97–108)
CO2 SERPL-SCNC: 25 MMOL/L (ref 21–32)
CREAT SERPL-MCNC: 0.93 MG/DL (ref 0.55–1.02)
ERYTHROCYTE [DISTWIDTH] IN BLOOD BY AUTOMATED COUNT: 16.4 % (ref 11.5–14.5)
GLOBULIN SER CALC-MCNC: 3.1 G/DL (ref 2–4)
GLUCOSE SERPL-MCNC: 94 MG/DL (ref 65–100)
HCT VFR BLD AUTO: 23.4 % (ref 35–47)
HGB BLD-MCNC: 7 G/DL (ref 11.5–16)
INR PPP: 1.1 (ref 0.9–1.1)
MAGNESIUM SERPL-MCNC: 2 MG/DL (ref 1.6–2.4)
MCH RBC QN AUTO: 26.1 PG (ref 26–34)
MCHC RBC AUTO-ENTMCNC: 29.9 G/DL (ref 30–36.5)
MCV RBC AUTO: 87.3 FL (ref 80–99)
PHOSPHATE SERPL-MCNC: 3.8 MG/DL (ref 2.6–4.7)
PLATELET # BLD AUTO: 297 K/UL (ref 150–400)
POTASSIUM SERPL-SCNC: 4.4 MMOL/L (ref 3.5–5.1)
PROT SERPL-MCNC: 6 G/DL (ref 6.4–8.2)
PROTHROMBIN TIME: 10.8 SEC (ref 9–11.1)
RBC # BLD AUTO: 2.68 M/UL (ref 3.8–5.2)
SODIUM SERPL-SCNC: 141 MMOL/L (ref 136–145)
TSH SERPL DL<=0.05 MIU/L-ACNC: 5.85 UIU/ML (ref 0.36–3.74)
WBC # BLD AUTO: 6.7 K/UL (ref 3.6–11)

## 2017-08-24 PROCEDURE — 74011250637 HC RX REV CODE- 250/637: Performed by: INTERNAL MEDICINE

## 2017-08-24 PROCEDURE — 77030032490 HC SLV COMPR SCD KNE COVD -B

## 2017-08-24 PROCEDURE — 36415 COLL VENOUS BLD VENIPUNCTURE: CPT | Performed by: INTERNAL MEDICINE

## 2017-08-24 PROCEDURE — 36430 TRANSFUSION BLD/BLD COMPNT: CPT

## 2017-08-24 PROCEDURE — 83735 ASSAY OF MAGNESIUM: CPT | Performed by: INTERNAL MEDICINE

## 2017-08-24 PROCEDURE — P9016 RBC LEUKOCYTES REDUCED: HCPCS | Performed by: EMERGENCY MEDICINE

## 2017-08-24 PROCEDURE — 80076 HEPATIC FUNCTION PANEL: CPT | Performed by: INTERNAL MEDICINE

## 2017-08-24 PROCEDURE — 80048 BASIC METABOLIC PNL TOTAL CA: CPT | Performed by: INTERNAL MEDICINE

## 2017-08-24 PROCEDURE — 84100 ASSAY OF PHOSPHORUS: CPT | Performed by: INTERNAL MEDICINE

## 2017-08-24 PROCEDURE — 77030029131 HC ADMN ST IV BLD N DEHP ICUM -B

## 2017-08-24 PROCEDURE — P9040 RBC LEUKOREDUCED IRRADIATED: HCPCS | Performed by: EMERGENCY MEDICINE

## 2017-08-24 PROCEDURE — 85610 PROTHROMBIN TIME: CPT | Performed by: INTERNAL MEDICINE

## 2017-08-24 PROCEDURE — 85027 COMPLETE CBC AUTOMATED: CPT | Performed by: INTERNAL MEDICINE

## 2017-08-24 PROCEDURE — 84443 ASSAY THYROID STIM HORMONE: CPT | Performed by: INTERNAL MEDICINE

## 2017-08-24 RX ORDER — SODIUM CHLORIDE 9 MG/ML
250 INJECTION, SOLUTION INTRAVENOUS AS NEEDED
Status: DISCONTINUED | OUTPATIENT
Start: 2017-08-24 | End: 2017-08-24 | Stop reason: HOSPADM

## 2017-08-24 RX ORDER — LEVOTHYROXINE SODIUM 50 UG/1
100 TABLET ORAL
Status: DISCONTINUED | OUTPATIENT
Start: 2017-08-25 | End: 2017-08-24 | Stop reason: HOSPADM

## 2017-08-24 RX ADMIN — Medication 10 ML: at 15:18

## 2017-08-24 RX ADMIN — METOPROLOL TARTRATE 25 MG: 25 TABLET ORAL at 11:12

## 2017-08-24 NOTE — PROGRESS NOTES
8/24/2017   CARE MANAGEMENT NOTE:  CM is following pt for initial discharge planning. EMR reviewed. CM met with pt who was her own historian for this needs assessment. Reportedly, pt resides alone in a ground floor apt without entry steps. PTA, pt was ambulatory, indepn with ADLs although SOB. She provides her own transportation. Pt uses [facility] on [address]. She does not have home healthcare nor DME for home use. PCP is Saturnino Marc N.P whom she saw a few days ago. Plan is to return home when medically stable.   [place]

## 2017-08-24 NOTE — PROGRESS NOTES
Occupational Therapy Note:    Orders acknowledged. Reviewed chart and spoke with pt's nurse, noted pt's hemoglobin to be 7.0. Nurse reports pt is scheduled to receive 1 unit of blood today, requesting therapy be deferred until pt receives blood. Will continue to follow pt and attempt to see this afternoon as able.  Thank you for this referral.    Miguel Angel Aguila

## 2017-08-24 NOTE — PROGRESS NOTES
Kaylee Jones MD, [address] [facility]  [address]  Office [phone]      IMPRESSION and RECOMMENDATIONS     1. PAF:  Stable. No AF on tele. Once again, would not resume xarelto/ASA until source of bleeding can be identified and treated. If this is not possible, then would consider antiarrhythmics to theoretically reduce CVA risk and/or Watchman device. This can be readdressed at her upcoming appt with Dr. Catracho Stephen. Stable for discharge from a cardiac standpoint. Will see prn. Future Appointments  Date Time Provider Jaylin Locki   9/8/2017 1:00 PM Giovani Fraire MD [facility] SCHED                                   Subjective:       Pt feels better, but H/H didn't change after 1U PRBCs. Objective:   Patient Vitals for the past 16 hrs:   BP Temp Pulse Resp SpO2   08/24/17 0346 138/64 98.7 °F (37.1 °C) 62 15 96 %   08/23/17 2322 - - 62 - -   08/23/17 2301 136/58 98.6 °F (37 °C) 71 16 98 %   08/23/17 1953 139/60 98.3 °F (36.8 °C) 78 15 99 %       HEENT Exam:     WNL         Lung Exam:     The patient is not dyspneic. Breath sounds are heard equally in all lung fields. There are no wheezes, rales, rhonchi, or rubs heard on auscultation. Heart Exam:     The rhythm is regular. The PMI is in the 5th intercostal space of the MCL. Apical impulse is normal. S1 is regular. S2 is physiologic. There is no S3, S4 gallop, murmur, click, or rub. Abdomen Exam:     Benign. Extremities Exam:     No cyanosis, clubbing, edema. Distal pulses intact.            Lab Results   Component Value Date/Time    Glucose 94 08/24/2017 01:50 AM    Sodium 141 08/24/2017 01:50 AM    Potassium 4.4 08/24/2017 01:50 AM    Chloride 110 08/24/2017 01:50 AM    CO2 25 08/24/2017 01:50 AM    BUN 22 08/24/2017 01:50 AM    Creatinine 0.93 08/24/2017 01:50 AM    Calcium 8.6 08/24/2017 01:50 AM     Recent Labs      08/24/17   0150  08/23/17   1012 WBC  6.7  7.3   HGB  7.0*  7.0*   HCT  23.4*  23.9*   PLT  297  351     Recent Labs      08/24/17   0150  08/23/17   1012   SGOT  10*  11*   ALT  13  18   AP  58  69   TBILI  0.3  0.2   TP  6.0*  7.1   ALB  2.9*  3.4*   GLOB  3.1  3.7     Recent Labs      08/24/17   0150  08/23/17   1012   INR  1.1  1.1   PTP  10.8  11.4*   APTT   --   25.9      No results for input(s): CPK, CKMB, TNIPOC in the last 72 hours.     No lab exists for component: TROPONINI, ITNL  Recent Labs      08/23/17   1012   TROIQ  <0.04     No results found for: CHOL, CHOLX, CHLST, CHOLV, HDL, LDL, LDLC, DLDLP, TGLX, TRIGL, TRIGP, CHHD, CHHDX

## 2017-08-24 NOTE — PROGRESS NOTES
Gastroenterology Progress Note    August 24, 2017  Admit Date: 8/23/2017         Narrative Assessment and Plan   · Iron deficiency anemia  · Atrial fibrillation on anticoagulation    Plan   - Hgb 7 and currently receiving blood transfusion   - plan is for outpatient EGD/Colonoscopy with pillcam to follow  - recommendations is NO anticoagulation at time of discharge due to bleeding risk  - please refer to Dr. Schuyler Spatz note as documented earlier for further details  ----  OK for discharge  I will arrange EGD/colonoscopy  No anticoagulation on discharge please  Wil Duron MD     Subjective:   · Patient up in bed. Does not have any complaints at this time. Denies abdominal pain, nausea or vomiting. She had 2 bowel movements this morning with no melena or hematochezia. ROS:  The previous review of systems on initial consultation / H&P is noted and reviewed. Specific changes noted above in HPI.     Current Medications:     Current Facility-Administered Medications   Medication Dose Route Frequency    [START ON 8/25/2017] levothyroxine (SYNTHROID) tablet 100 mcg  100 mcg Oral ACB    0.9% sodium chloride infusion 250 mL  250 mL IntraVENous PRN    metoprolol tartrate (LOPRESSOR) tablet 25 mg  25 mg Oral BID    0.9% sodium chloride with KCl 20 mEq/L infusion   IntraVENous CONTINUOUS    sodium chloride (NS) flush 5-10 mL  5-10 mL IntraVENous Q8H    sodium chloride (NS) flush 5-10 mL  5-10 mL IntraVENous PRN    acetaminophen (TYLENOL) tablet 650 mg  650 mg Oral Q4H PRN    HYDROcodone-acetaminophen (NORCO) 5-325 mg per tablet 1 Tab  1 Tab Oral Q4H PRN    HYDROmorphone (PF) (DILAUDID) injection 0.5 mg  0.5 mg IntraVENous Q4H PRN    naloxone (NARCAN) injection 0.4 mg  0.4 mg IntraVENous PRN    diphenhydrAMINE (BENADRYL) injection 12.5 mg  12.5 mg IntraVENous Q4H PRN    ondansetron (ZOFRAN) injection 4 mg  4 mg IntraVENous Q6H PRN    docusate sodium (COLACE) capsule 100 mg  100 mg Oral BID    nicotine (NICODERM CQ) 21 mg/24 hr patch 1 Patch  1 Patch TransDERmal Q24H    iron sucrose (VENOFER) 200 mg in 0.9% sodium chloride 100 mL IVPB  200 mg IntraVENous Q24H       Objective:     VITALS:   Last 24hrs VS reviewed since prior progress note. Most recent are:  Visit Vitals    /71 (BP 1 Location: Left arm)    Pulse 70    Temp 98.2 °F (36.8 °C)    Resp 16    Ht 5' 5\" (1.651 m)    Wt 68.9 kg (152 lb)    SpO2 96%    BMI 25.29 kg/m2     Temp (24hrs), Av.5 °F (36.9 °C), Min:98.1 °F (36.7 °C), Max:98.9 °F (37.2 °C)      Intake/Output Summary (Last 24 hours) at 17 1317  Last data filed at 17 1216   Gross per 24 hour   Intake          1248.75 ml   Output                1 ml   Net          1247.75 ml       EXAM:  General: Comfortable, no distress    Lungs:  No abnormal audible breath sounds. Speaking in complete sentences  Heart:  Irregular rhythm  Abdomen: Nondistended, nontender. No mass, guarding or rebound  Neurologic:  Cranial nerves grossly intact, moves all 4 extremities  Psych:   Good insight. Not anxious nor agitated    Lab Data Reviewed:   Recent Labs      17   0150  17   1012  17   1657   WBC  6.7  7.3  7.1   HGB  7.0*  7.0*  7.2*   HCT  23.4*  23.9*  23.5*   PLT  297  351  402*     Recent Labs      17   0150  17   1012  17   1657   NA  141  145  144   K  4.4  3.7  4.3   CL  110*  107  104   CO2  25  30  23   GLU  94  139*  74   BUN  22*  24*  22   CREA  0.93  1.07*  0.88   CA  8.6  9.2  9.2   MG  2.0   --    --    PHOS  3.8   --    --    ALB  2.9*  3.4*   --    TBILI  0.3  0.2   --    SGOT  10*  11*   --    ALT  13  18   --    INR  1.1  1.1   --      No results found for: GLUCPOC  No results for input(s): PH, PCO2, PO2, HCO3, FIO2 in the last 72 hours.   Recent Labs      17   0150  17   1012   INR  1.1  1.1           Assessment:   (See above)  Principal Problem:    Acute blood loss anemia (2017)    Active Problems: Paroxysmal a-fib (HCC) (9/24/2015)      Symptomatic anemia (8/23/2017)      Dyspnea (8/23/2017)      Fatigue (8/23/2017)      Hypothyroid (8/23/2017)        Plan:   (See above)    Signed By:  Sandra Beck PA-C  8/24/2017  1:17 PM

## 2017-08-24 NOTE — DISCHARGE INSTRUCTIONS
Patient Discharge Instructions    Toni Johnson / [id number] : 1945    Admitted 2017 Discharged: 2017 12:09 PM     ACUTE DIAGNOSES:  Acute blood loss anemia    CHRONIC MEDICAL DIAGNOSES:  Problem List as of 2017  Date Reviewed: 2017          Codes Class Noted - Resolved    * (Principal)Acute blood loss anemia ICD-10-CM: D62  ICD-9-CM: 285.1  2017 - Present        Symptomatic anemia ICD-10-CM: D64.9  ICD-9-CM: 285.9  2017 - Present        Dyspnea ICD-10-CM: R06.00  ICD-9-CM: 786.09  2017 - Present        Fatigue ICD-10-CM: R53.83  ICD-9-CM: 780.79  2017 - Present        Hypothyroid (Chronic) ICD-10-CM: E03.9  ICD-9-CM: 244.9  2017 - Present        Cigarette nicotine dependence without complication [address]: U35.107  ICD-9-CM: 305.1  6/10/2017 - Present        Dental infection ICD-10-CM: K04.7  ICD-9-CM: 522.4  2017 - Present        Calculus of gallbladder with chronic cholecystitis without obstruction ICD-10-CM: K80.10  ICD-9-CM: 574.10  8/15/2016 - Present        S/P ablation of atrial fibrillation ICD-10-CM: Z98.890, Z86.79  ICD-9-CM: V45.89  8/15/2016 - Present        Hashimoto's thyroiditis ICD-10-CM: E06.3  ICD-9-CM: 245.2  8/15/2016 - Present        Irritable bowel syndrome with diarrhea ICD-10-CM: K58.0  ICD-9-CM: 564.1  8/15/2016 - Present        Paroxysmal a-fib (HCC) ICD-10-CM: I48.0  ICD-9-CM: 427.31  2015 - Present        RESOLVED: Atrial fibrillation with rapid ventricular response ([address] ICD-10-CM: I48.91  ICD-9-CM: 427.31  2017 - 6/10/2017        RESOLVED: Cataract ICD-10-CM: H26.9  ICD-9-CM: 366.9  2014 - 2014              DISCHARGE MEDICATIONS:         · It is important that you take the medication exactly as they are prescribed. · Keep your medication in the bottles provided by the pharmacist and keep a list of the medication names, dosages, and times to be taken in your wallet.    · Do not take other medications without consulting your doctor. DIET:  Regular Diet    ACTIVITY: Activity as tolerated    ADDITIONAL INFORMATION: If you experience any of the following symptoms then please call your primary care physician or return to the emergency room if you cannot get hold of your doctor: Fever, chills, nausea, vomiting, diarrhea, change in mentation, falling, bleeding, shortness of breath. FOLLOW UP CARE:  Dr. Govea Or, NP  you are to call and set up an appointment to see them with in 1 week. Follow-up with specialists at directed by them      Information obtained by :  I understand that if any problems occur once I am at home I am to contact my physician. I understand and acknowledge receipt of the instructions indicated above.                                                                                                                                            Physician's or R.N.'s Signature                                                                  Date/Time                                                                                                                                              Patient or Representative Signature                                                          Date/Time  Follow up with Jessica Reid MD on September 8th, 2017 at [address] [place] [address]  [phone]

## 2017-08-24 NOTE — PROGRESS NOTES
GI note - I spoke to her on the phone as I am off site. Hgb 7  She had 2 BM since admission - no visible blood, no melena. I offered inpatient endoscopic evaluation which would require prep today and another night in hospital, she declined. Clinically it is safe for her to be discharged later today after her blood transfusion. I will arrange EGD and colonoscopy next week with pillcam to follow. She should not restart anticoagulation on discharge. We (She, I and Dr. Kapil Gallegos) will need to come to a final decision about long term anticoagulation based on results of endoscopic / pillcam study.     Robert Goodrich MD

## 2017-08-24 NOTE — PROGRESS NOTES
Orders received and chart reviewed. Patient to get blood this morning due to low Hgb at 7.0. Will see for PT following transfusion.

## 2017-08-24 NOTE — PROGRESS NOTES
Occupational Therapy Note:    Chart reviewed. Spoke to patient and nursing. Patient currently receiving 2nd unit of blood. Patient confirmed that she was completely independent prior to this admission and that she does not need PT/OT. Nursing reports patient is up ad karen in room and performing toileting and grooming without assistance. No history of falls. Will complete order and remove patient from OT list. Patient scheduled for discharge after transfusion. Please re-consult if status changes.     Thanks,    [place]

## 2017-08-24 NOTE — PROGRESS NOTES
Daily Progress Note: 8/24/2017  Sofia Rivero NP    Assessment/Plan:   1) Acute symptomatic anemia/Acute blood loss anemia: likely has a GI bleed from xarelto/ASA. Will check iron, ferritin, LDH, haptoglobin, stool blood. Transfused 1u RBCs 8/23. Consulted GI- plan was for out patient Endoscopy. Hb 7.0 this am. Transfuse another unit and then discharge.     2) Paroxysmal a-fib: in NSR. Will hold xarelto/ASA due to above. Cont metoprolol     3) Hypothyroid: check TSH, cont synthroid     4) Tobacco abuse: counseled on cessation.   Start nicotine patch     Code: Full      Problem List:  Problem List as of 8/24/2017  Date Reviewed: 8/23/2017          Codes Class Noted - Resolved    * (Principal)Acute blood loss anemia ICD-10-CM: D62  ICD-9-CM: 285.1  8/23/2017 - Present        Symptomatic anemia ICD-10-CM: D64.9  ICD-9-CM: 285.9  8/23/2017 - Present        Dyspnea ICD-10-CM: R06.00  ICD-9-CM: 786.09  8/23/2017 - Present        Fatigue ICD-10-CM: R53.83  ICD-9-CM: 780.79  8/23/2017 - Present        Hypothyroid (Chronic) ICD-10-CM: E03.9  ICD-9-CM: 244.9  8/23/2017 - Present        Cigarette nicotine dependence without complication HLP-51-CJ: V78.574  ICD-9-CM: 305.1  6/10/2017 - Present        Dental infection ICD-10-CM: K04.7  ICD-9-CM: 522.4  5/20/2017 - Present        Calculus of gallbladder with chronic cholecystitis without obstruction ICD-10-CM: K80.10  ICD-9-CM: 574.10  8/15/2016 - Present        S/P ablation of atrial fibrillation ICD-10-CM: Z98.890, Z86.79  ICD-9-CM: V45.89  8/15/2016 - Present        Hashimoto's thyroiditis ICD-10-CM: E06.3  ICD-9-CM: 245.2  8/15/2016 - Present        Irritable bowel syndrome with diarrhea ICD-10-CM: K58.0  ICD-9-CM: 564.1  8/15/2016 - Present        Paroxysmal a-fib ([facility] ICD-10-CM: I48.0  ICD-9-CM: 427.31  9/24/2015 - Present        RESOLVED: Atrial fibrillation with rapid ventricular response ([facility] ICD-10-CM: I48.91  ICD-9-CM: 427.31  5/20/2017 - 6/10/2017        RESOLVED: Cataract ICD-10-CM: H26.9  ICD-9-CM: 366.9  2014 - 2014              Subjective:    73 yo hx of Pafib, hypothyroid, presented w/ dyspnea, fatigue, acute anemia. The patient c/o fatigue for about 3 weeks. Denied chest pain, cough, fevers, chills, nausea, vomiting, diarrhea, melena, or BRBPR. She saw her Cardiologist today and was found to have a Hgb of 7.0 (last Hgb was 12.9 in May). (Dr Gurmeet Atkinson)    :  Received 1 unit of PRCs  - Hb remains at 7.0.  TSH lower at 5.25 - will increase synthroid to 100mcg per day. Will transfuse another unit. Was seen by GI and plan is for outpatient endoscopy. Seen by Cards and will hold anticoagulation until work up has been completed.        Review of Systems:   A comprehensive review of systems was negative except for that written in the HPI. Objective:   Physical Exam:     Visit Vitals    /64    Pulse 62    Temp 98.7 °F (37.1 °C)    Resp 15    Ht 5' 5\" (1.651 m)    Wt 68.9 kg (152 lb)    SpO2 96%    BMI 25.29 kg/m2      O2 Device: Room air    Temp (24hrs), Av.3 °F (36.8 °C), Min:97.8 °F (36.6 °C), Max:98.9 °F (37.2 °C)    1901 -  0700  In: 888.8 [I.V.:888.8]  Out: 1 [Urine:1]    07 - 1900  In: 360   Out: -     General:  Alert, cooperative, no distress, appears stated age. Head:  Normocephalic, without obvious abnormality, atraumatic. Eyes:  Conjunctivae/corneas clear. PERRL, EOMs intact. Throat: Lips, mucosa, and tongue moist..   Neck: Supple, symmetrical, trachea midline, no adenopathy, thyroid: no enlargement/tenderness/nodules, no carotid bruit and no JVD. Lungs:   Clear to auscultation bilaterally. Chest wall:  No tenderness or deformity. Heart:  Regular rate and rhythm, S1, S2 normal, no murmur, click, rub or gallop. Abdomen:   Soft, non-tender. Bowel sounds normal. No masses,  No organomegaly. Extremities: no cyanosis or edema. No calf tenderness or cords.      Pulses: 2+ and symmetric all extremities. Skin: Skin color, texture, turgor normal. No rashes or lesions   Neurologic: CNII-XII intact. Alert and oriented X 3. Fine motor of hands and fingers normal.   equal.  No cogwheeling or rigidity. Gait not tested at this time. Sensation grossly normal to touch.   Gross motor of extremities normal.       Data Review:       Recent Days:  Recent Labs      08/24/17   0150  08/23/17   1012  08/22/17   1657   WBC  6.7  7.3  7.1   HGB  7.0*  7.0*  7.2*   HCT  23.4*  23.9*  23.5*   PLT  297  351  402*     Recent Labs      08/24/17   0150  08/23/17   1012  08/22/17   1657   NA  141  145  144   K  4.4  3.7  4.3   CL  110*  107  104   CO2  25  30  23   GLU  94  139*  74   BUN  22*  24*  22   CREA  0.93  1.07*  0.88   CA  8.6  9.2  9.2   MG  2.0   --    --    PHOS  3.8   --    --    ALB  2.9*  3.4*   --    TBILI  0.3  0.2   --    SGOT  10*  11*   --    ALT  13  18   --    INR  1.1  1.1   --        24 Hour Results:  Recent Results (from the past 24 hour(s))   EKG, 12 LEAD, INITIAL    Collection Time: 08/23/17  9:47 AM   Result Value Ref Range    Ventricular Rate 81 BPM    Atrial Rate 81 BPM    P-R Interval 112 ms    QRS Duration 70 ms    Q-T Interval 384 ms    QTC Calculation (Bezet) 446 ms    Calculated P Axis 20 degrees    Calculated R Axis 18 degrees    Calculated T Axis 42 degrees    Diagnosis       Sinus rhythm with frequent premature ventricular complexes in a pattern of   bigeminy  Nonspecific ST abnormality  Abnormal ECG    Confirmed by Matthew Welsh MD., [place] ([id number]) on 8/23/2017 12:45:59 PM     CBC WITH AUTOMATED DIFF    Collection Time: 08/23/17 10:12 AM   Result Value Ref Range    WBC 7.3 3.6 - 11.0 K/uL    RBC 2.70 (L) 3.80 - 5.20 M/uL    HGB 7.0 (L) 11.5 - 16.0 g/dL    HCT 23.9 (L) 35.0 - 47.0 %    MCV 88.5 80.0 - 99.0 FL    MCH 25.9 (L) 26.0 - 34.0 PG    MCHC 29.3 (L) 30.0 - 36.5 g/dL    RDW 16.7 (H) 11.5 - 14.5 %    PLATELET 425 648 - 723 K/uL    NEUTROPHILS 70 32 - 75 % LYMPHOCYTES 19 12 - 49 %    MONOCYTES 8 5 - 13 %    EOSINOPHILS 2 0 - 7 %    BASOPHILS 1 0 - 1 %    ABS. NEUTROPHILS 5.2 1.8 - 8.0 K/UL    ABS. LYMPHOCYTES 1.4 0.8 - 3.5 K/UL    ABS. MONOCYTES 0.6 0.0 - 1.0 K/UL    ABS. EOSINOPHILS 0.1 0.0 - 0.4 K/UL    ABS. BASOPHILS 0.0 0.0 - 0.1 K/UL   METABOLIC PANEL, COMPREHENSIVE    Collection Time: 08/23/17 10:12 AM   Result Value Ref Range    Sodium 145 136 - 145 mmol/L    Potassium 3.7 3.5 - 5.1 mmol/L    Chloride 107 97 - 108 mmol/L    CO2 30 21 - 32 mmol/L    Anion gap 8 5 - 15 mmol/L    Glucose 139 (H) 65 - 100 mg/dL    BUN 24 (H) 6 - 20 MG/DL    Creatinine 1.07 (H) 0.55 - 1.02 MG/DL    BUN/Creatinine ratio 22 (H) 12 - 20      GFR est AA >60 >60 ml/min/1.73m2    GFR est non-AA 50 (L) >60 ml/min/1.73m2    Calcium 9.2 8.5 - 10.1 MG/DL    Bilirubin, total 0.2 0.2 - 1.0 MG/DL    ALT (SGPT) 18 12 - 78 U/L    AST (SGOT) 11 (L) 15 - 37 U/L    Alk.  phosphatase 69 45 - 117 U/L    Protein, total 7.1 6.4 - 8.2 g/dL    Albumin 3.4 (L) 3.5 - 5.0 g/dL    Globulin 3.7 2.0 - 4.0 g/dL    A-G Ratio 0.9 (L) 1.1 - 2.2     PTT    Collection Time: 08/23/17 10:12 AM   Result Value Ref Range    aPTT 25.9 22.1 - 32.5 sec    aPTT, therapeutic range     58.0 - 77.0 SECS   PROTHROMBIN TIME + INR    Collection Time: 08/23/17 10:12 AM   Result Value Ref Range    INR 1.1 0.9 - 1.1      Prothrombin time 11.4 (H) 9.0 - 11.1 sec   TROPONIN I    Collection Time: 08/23/17 10:12 AM   Result Value Ref Range    Troponin-I, Qt. <0.04 <0.05 ng/mL   FERRITIN    Collection Time: 08/23/17 10:12 AM   Result Value Ref Range    Ferritin 3 (L) 8 - 252 NG/ML   TYPE + CROSSMATCH    Collection Time: 08/23/17 10:12 AM   Result Value Ref Range    Crossmatch Expiration 08/26/2017     ABO/Rh(D) O NEGATIVE     Antibody screen NEG     Unit number [id number]     Blood component type RC LRIR AS1     Unit division 00     Status of unit ISSUED     Crossmatch result Compatible     Unit number [id number]     Blood component type RC LRIR AS1     Unit division 00     Status of unit ALLOCATED     Crossmatch result Compatible    OCCULT BLOOD, STOOL    Collection Time: 08/23/17 10:45 AM   Result Value Ref Range    Occult blood, stool POSITIVE (A) NEG     IRON PROFILE    Collection Time: 08/23/17 11:43 AM   Result Value Ref Range    Iron 15 (L) 35 - 150 ug/dL    TIBC 425 250 - 450 ug/dL    Iron % saturation 4 (L) 20 - 50 %   VITAMIN B12    Collection Time: 08/23/17 11:43 AM   Result Value Ref Range    Vitamin B12 395 211 - 911 pg/mL   FOLATE    Collection Time: 08/23/17 11:43 AM   Result Value Ref Range    Folate 36.8 (H) 5.0 - 21.0 ng/mL   LD    Collection Time: 08/23/17 11:43 AM   Result Value Ref Range     81 - 246 U/L   HAPTOGLOBIN    Collection Time: 08/23/17 11:43 AM   Result Value Ref Range    Haptoglobin 227 (H) 30 - 132 mg/dL   METABOLIC PANEL, BASIC    Collection Time: 08/24/17  1:50 AM   Result Value Ref Range    Sodium 141 136 - 145 mmol/L    Potassium 4.4 3.5 - 5.1 mmol/L    Chloride 110 (H) 97 - 108 mmol/L    CO2 25 21 - 32 mmol/L    Anion gap 6 5 - 15 mmol/L    Glucose 94 65 - 100 mg/dL    BUN 22 (H) 6 - 20 MG/DL    Creatinine 0.93 0.55 - 1.02 MG/DL    BUN/Creatinine ratio 24 (H) 12 - 20      GFR est AA >60 >60 ml/min/1.73m2    GFR est non-AA 59 (L) >60 ml/min/1.73m2    Calcium 8.6 8.5 - 10.1 MG/DL   CBC W/O DIFF    Collection Time: 08/24/17  1:50 AM   Result Value Ref Range    WBC 6.7 3.6 - 11.0 K/uL    RBC 2.68 (L) 3.80 - 5.20 M/uL    HGB 7.0 (L) 11.5 - 16.0 g/dL    HCT 23.4 (L) 35.0 - 47.0 %    MCV 87.3 80.0 - 99.0 FL    MCH 26.1 26.0 - 34.0 PG    MCHC 29.9 (L) 30.0 - 36.5 g/dL    RDW 16.4 (H) 11.5 - 14.5 %    PLATELET 672 041 - 195 K/uL   MAGNESIUM    Collection Time: 08/24/17  1:50 AM   Result Value Ref Range    Magnesium 2.0 1.6 - 2.4 mg/dL   PHOSPHORUS    Collection Time: 08/24/17  1:50 AM   Result Value Ref Range    Phosphorus 3.8 2.6 - 4.7 MG/DL   HEPATIC FUNCTION PANEL    Collection Time: 08/24/17  1:50 AM Result Value Ref Range    Protein, total 6.0 (L) 6.4 - 8.2 g/dL    Albumin 2.9 (L) 3.5 - 5.0 g/dL    Globulin 3.1 2.0 - 4.0 g/dL    A-G Ratio 0.9 (L) 1.1 - 2.2      Bilirubin, total 0.3 0.2 - 1.0 MG/DL    Bilirubin, direct 0.1 0.0 - 0.2 MG/DL    Alk. phosphatase 58 45 - 117 U/L    AST (SGOT) 10 (L) 15 - 37 U/L    ALT (SGPT) 13 12 - 78 U/L   PROTHROMBIN TIME + INR    Collection Time: 08/24/17  1:50 AM   Result Value Ref Range    INR 1.1 0.9 - 1.1      Prothrombin time 10.8 9.0 - 11.1 sec   TSH 3RD GENERATION    Collection Time: 08/24/17  1:50 AM   Result Value Ref Range    TSH 5.85 (H) 0.36 - 3.74 uIU/mL     Medications reviewed  Current Facility-Administered Medications   Medication Dose Route Frequency    levothyroxine (SYNTHROID) tablet 88 mcg  88 mcg Oral ACB    metoprolol tartrate (LOPRESSOR) tablet 25 mg  25 mg Oral BID    0.9% sodium chloride with KCl 20 mEq/L infusion   IntraVENous CONTINUOUS    sodium chloride (NS) flush 5-10 mL  5-10 mL IntraVENous Q8H    sodium chloride (NS) flush 5-10 mL  5-10 mL IntraVENous PRN    acetaminophen (TYLENOL) tablet 650 mg  650 mg Oral Q4H PRN    HYDROcodone-acetaminophen (NORCO) 5-325 mg per tablet 1 Tab  1 Tab Oral Q4H PRN    HYDROmorphone (PF) (DILAUDID) injection 0.5 mg  0.5 mg IntraVENous Q4H PRN    naloxone (NARCAN) injection 0.4 mg  0.4 mg IntraVENous PRN    diphenhydrAMINE (BENADRYL) injection 12.5 mg  12.5 mg IntraVENous Q4H PRN    ondansetron (ZOFRAN) injection 4 mg  4 mg IntraVENous Q6H PRN    docusate sodium (COLACE) capsule 100 mg  100 mg Oral BID    nicotine (NICODERM CQ) 21 mg/24 hr patch 1 Patch  1 Patch TransDERmal Q24H    iron sucrose (VENOFER) 200 mg in 0.9% sodium chloride 100 mL IVPB  200 mg IntraVENous Q24H       Care Plan discussed with: Patient/Family  Total time spent with patient: 30 minutes.   Deyanira Crum MD

## 2017-08-24 NOTE — DISCHARGE SUMMARY
Physician Discharge Summary     Patient ID:    Lisa Solo  [id number]  32 y.o.  1945  Bakari Downs NP    Admit date: 8/23/2017    Discharge date and time: 8/24/2017    Admission Diagnoses: Acute blood loss anemia    Chronic Diagnoses:    Problem List as of 8/24/2017  Date Reviewed: 8/24/2017          Codes Class Noted - Resolved    * (Principal)Acute blood loss anemia ICD-10-CM: D62  ICD-9-CM: 285.1  8/23/2017 - Present        Symptomatic anemia ICD-10-CM: D64.9  ICD-9-CM: 285.9  8/23/2017 - Present        Dyspnea ICD-10-CM: R06.00  ICD-9-CM: 786.09  8/23/2017 - Present        Fatigue ICD-10-CM: R53.83  ICD-9-CM: 780.79  8/23/2017 - Present        Hypothyroid (Chronic) ICD-10-CM: E03.9  ICD-9-CM: 244.9  8/23/2017 - Present        Cigarette nicotine dependence without complication EKO-64-RW: K45.335  ICD-9-CM: 305.1  6/10/2017 - Present        Dental infection ICD-10-CM: K04.7  ICD-9-CM: 522.4  5/20/2017 - Present        Calculus of gallbladder with chronic cholecystitis without obstruction ICD-10-CM: K80.10  ICD-9-CM: 574.10  8/15/2016 - Present        S/P ablation of atrial fibrillation ICD-10-CM: Z98.890, Z86.79  ICD-9-CM: V45.89  8/15/2016 - Present        Hashimoto's thyroiditis ICD-10-CM: E06.3  ICD-9-CM: 245.2  8/15/2016 - Present        Irritable bowel syndrome with diarrhea ICD-10-CM: K58.0  ICD-9-CM: 564.1  8/15/2016 - Present        Paroxysmal a-fib ([facility] ICD-10-CM: I48.0  ICD-9-CM: 427.31  9/24/2015 - Present        RESOLVED: Atrial fibrillation with rapid ventricular response ([address] ICD-10-CM: I48.91  ICD-9-CM: 427.31  5/20/2017 - 6/10/2017        RESOLVED: Cataract ICD-10-CM: H26.9  ICD-9-CM: 366.9  8/19/2014 - 8/19/2014              Discharge Medications:   Current Discharge Medication List      CONTINUE these medications which have NOT CHANGED    Details   metoprolol tartrate (LOPRESSOR) 50 mg tablet Take 1 Tab by mouth two (2) times a day.   Qty: 180 Tab, Refills: 3      levothyroxine (SYNTHROID) 88 mcg tablet Take 88 mcg by mouth Daily (before breakfast). STOP taking these medications       rivaroxaban (XARELTO) 20 mg tab tablet Comments:   Reason for Stopping:         aspirin delayed-release 81 mg tablet Comments:   Reason for Stopping: Follow up Care:    1. Lashae Peterson NP with in 1 weeks  2. Cardiology and GI specialists as directed. Diet:  Regular Diet    Disposition:  Home. Advanced Directive:    Discharge Exam:  [See today's progress note.]    CONSULTATIONS: Cardiology and GI    Significant Diagnostic Studies:   Recent Labs      08/24/17   0150  08/23/17   1012   WBC  6.7  7.3   HGB  7.0*  7.0*   HCT  23.4*  23.9*   PLT  297  351     Recent Labs      08/24/17   0150  08/23/17   1012  08/22/17   1657   NA  141  145  144   K  4.4  3.7  4.3   CL  110*  107  104   CO2  25  30  23   BUN  22*  24*  22   CREA  0.93  1.07*  0.88   GLU  94  139*  74   CA  8.6  9.2  9.2   MG  2.0   --    --    PHOS  3.8   --    --      Recent Labs      08/24/17   0150  08/23/17   1012   SGOT  10*  11*   ALT  13  18   AP  58  69   TBILI  0.3  0.2   TP  6.0*  7.1   ALB  2.9*  3.4*   GLOB  3.1  3.7     Recent Labs      08/24/17   0150  08/23/17   1012   INR  1.1  1.1   PTP  10.8  11.4*   APTT   --   25.9      Recent Labs      08/23/17   1143  08/23/17   1012   TIBC  425   --    PSAT  4*   --    FERR   --   3*      Lab Results   Component Value Date/Time    TSH 5.85 08/24/2017 01:50 AM    Triiodothyronine (T3), free 3.1 04/01/2014 08:30 AM    T4, Free 1.4 05/20/2017 03:40 PM    T4, Total 12.8 04/01/2014 08:30 AM       HOSPITAL COURSE & TREATMENT RENDERED:   Assessment/Plan:   1) Acute symptomatic anemia/Acute blood loss anemia: likely has a GI bleed from xarelto/ASA. Nikhil Vaughn check iron, ferritin, LDH, haptoglobin, stool blood.  Transfused 1u RBCs 8/23.  Consulted GI- plan was for out patient Endoscopy.  Hb 7.0 this am. Transfuse another unit and then discharge.      2) Paroxysmal a-fib: in NSR.  Will hold xarelto/ASA due to above.  Cont metoprolol      3) Hypothyroid: check TSH, cont synthroid      4) Tobacco abuse: counseled on cessation.  Start nicotine patch      Code: Full         Subjective:    73 yo hx of Pafib, hypothyroid, presented w/ dyspnea, fatigue, acute anemia.  The patient c/o fatigue for about 3 weeks.  Denied chest pain, cough, fevers, chills, nausea, vomiting, diarrhea, melena, or BRBPR.  She saw her Cardiologist today and was found to have a Hgb of 7.0 (last Hgb was 12.9 in May).  (Dr Mcbride)     :  Received 1 unit of PRCs  - Hb remains at 7.0.  TSH lower at 5.25 - will increase synthroid to 100mcg per day. Will transfuse another unit. Was seen by GI and plan is for outpatient endoscopy. Seen by Cards and will hold anticoagulation until work up has been completed.         Review of Systems:   A comprehensive review of systems was negative except for that written in the HPI.     Objective:   Physical Exam:           Visit Vitals    /64    Pulse 62    Temp 98.7 °F (37.1 °C)    Resp 15    Ht 5' 5\" (1.651 m)    Wt 68.9 kg (152 lb)    SpO2 96%    BMI 25.29 kg/m2      O2 Device: Room air     Temp (24hrs), Av.3 °F (36.8 °C), Min:97.8 °F (36.6 °C), Max:98.9 °F (37.2 °C)    1901 -  0700  In: 888.8 [I.V.:888.8]  Out: 1 [Urine:1]    07 - 1900  In: 360   Out: -      General:  Alert, cooperative, no distress, appears stated age. Head:  Normocephalic, without obvious abnormality, atraumatic. Eyes:  Conjunctivae/corneas clear. PERRL, EOMs intact. Throat: Lips, mucosa, and tongue moist..   Neck: Supple, symmetrical, trachea midline, no adenopathy, thyroid: no enlargement/tenderness/nodules, no carotid bruit and no JVD. Lungs:   Clear to auscultation bilaterally. Chest wall:  No tenderness or deformity. Heart:  Regular rate and rhythm, S1, S2 normal, no murmur, click, rub or gallop. Abdomen:   Soft, non-tender.  Bowel sounds normal. No masses,  No organomegaly. Extremities: no cyanosis or edema. No calf tenderness or cords. Pulses: 2+ and symmetric all extremities. Skin: Skin color, texture, turgor normal. No rashes or lesions   Neurologic: CNII-XII intact. Alert and oriented X 3. Fine motor of hands and fingers normal.   equal.  No cogwheeling or rigidity. Gait not tested at this time. Sensation grossly normal to touch.   Gross motor of extremities normal.           Signed:  Yadiel Jacob MD  8/24/2017  12:09 PM

## 2017-08-24 NOTE — PROGRESS NOTES
Bedside shift change report given to Rosealee Baumgarten, RN (oncoming nurse) by Sylvia Santiago RN (offgoing nurse). Report included the following information SBAR, Kardex, Procedure Summary, Intake/Output, MAR and Recent Results.

## 2017-08-25 LAB
ABO + RH BLD: NORMAL
BLD PROD TYP BPU: NORMAL
BLD PROD TYP BPU: NORMAL
BLOOD GROUP ANTIBODIES SERPL: NORMAL
BPU ID: NORMAL
BPU ID: NORMAL
CROSSMATCH RESULT,%XM: NORMAL
CROSSMATCH RESULT,%XM: NORMAL
SPECIMEN EXP DATE BLD: NORMAL
STATUS OF UNIT,%ST: NORMAL
STATUS OF UNIT,%ST: NORMAL
UNIT DIVISION, %UDIV: 0
UNIT DIVISION, %UDIV: 0

## 2017-08-25 NOTE — PROGRESS NOTES
I have reviewed discharge instructions with the patient: reviewed s/s to report, meds, f/up appointment. The patient verbalized understanding. IV removed.

## 2017-09-05 RX ORDER — CITALOPRAM 20 MG/1
20 TABLET, FILM COATED ORAL DAILY
COMMUNITY
End: 2017-11-03

## 2017-09-05 NOTE — PERIOP NOTES
[address] [place] [address] Dr Nolasco Preprocedure Instructions      1. On the day of your surgery, please report to registration located on the 2nd floor of the  [facility]. yes    2. You must have a responsible adult to drive you to the hospital, stay at the hospital during your procedure and drive you home. If they leave your procedure will not be started (no exceptions). yes    3. Do not have anything to eat or drink (including water, gum, mints, coffee, and juice) after midnight. This does not apply to the medications you were instructed to take by your physician. yesIf you are currently taking Plavix, Coumadin, Aspirin, or other blood-thinning agents, contact your physician for special instructions. not applicable,    4. If you are having a procedure that requires bowel prep: We recommend that you have only clear liquids the day before your procedure and begin your bowel prep by 5:00 pm.  You may continue to drink clear liquids until midnight. If for any reason you are not able to complete your prep please notify your physician immediately. yes    5. Have a list of all current medications, including vitamins, herbal supplements and any other over the counter medications. yes    6. If you wear glasses, contacts, dentures and/or hearing aids, they may be removed prior to procedure, please bring a case to store them in. yes    7. You should understand that if you do not follow these instructions your procedure may be cancelled. If your physical condition changes (I.e. fever, cold or flu) please contact your doctor as soon as possible. 8. It is important that you be on time.   If for any reason you are unable to keep your appointment please call [phone] the day of or your physicians office prior to your scheduled procedure

## 2017-09-06 ENCOUNTER — ANESTHESIA EVENT (OUTPATIENT)
Dept: ENDOSCOPY | Age: 72
End: 2017-09-06
Payer: MEDICARE

## 2017-09-06 ENCOUNTER — ANESTHESIA (OUTPATIENT)
Dept: ENDOSCOPY | Age: 72
End: 2017-09-06
Payer: MEDICARE

## 2017-09-06 ENCOUNTER — HOSPITAL ENCOUNTER (OUTPATIENT)
Age: 72
Setting detail: OUTPATIENT SURGERY
Discharge: HOME | End: 2017-09-06
Attending: SPECIALIST | Admitting: SPECIALIST
Payer: MEDICARE

## 2017-09-06 VITALS
TEMPERATURE: 97.5 F | HEIGHT: 65 IN | BODY MASS INDEX: 25.33 KG/M2 | DIASTOLIC BLOOD PRESSURE: 70 MMHG | RESPIRATION RATE: 20 BRPM | HEART RATE: 52 BPM | WEIGHT: 152 LBS | OXYGEN SATURATION: 100 % | SYSTOLIC BLOOD PRESSURE: 118 MMHG

## 2017-09-06 PROCEDURE — 74011250637 HC RX REV CODE- 250/637: Performed by: SPECIALIST

## 2017-09-06 PROCEDURE — 74011000250 HC RX REV CODE- 250

## 2017-09-06 PROCEDURE — 76040000019: Performed by: SPECIALIST

## 2017-09-06 PROCEDURE — 74011250636 HC RX REV CODE- 250/636

## 2017-09-06 PROCEDURE — 77030013992 HC SNR POLYP ENDOSC BSC -B: Performed by: SPECIALIST

## 2017-09-06 PROCEDURE — 76060000031 HC ANESTHESIA FIRST 0.5 HR: Performed by: SPECIALIST

## 2017-09-06 PROCEDURE — 77030009426 HC FCPS BIOP ENDOSC BSC -B: Performed by: SPECIALIST

## 2017-09-06 PROCEDURE — 88305 TISSUE EXAM BY PATHOLOGIST: CPT | Performed by: SPECIALIST

## 2017-09-06 RX ORDER — PROPOFOL 10 MG/ML
INJECTION, EMULSION INTRAVENOUS
Status: DISCONTINUED | OUTPATIENT
Start: 2017-09-06 | End: 2017-09-06 | Stop reason: HOSPADM

## 2017-09-06 RX ORDER — LIDOCAINE HYDROCHLORIDE 20 MG/ML
INJECTION, SOLUTION EPIDURAL; INFILTRATION; INTRACAUDAL; PERINEURAL AS NEEDED
Status: DISCONTINUED | OUTPATIENT
Start: 2017-09-06 | End: 2017-09-06 | Stop reason: HOSPADM

## 2017-09-06 RX ORDER — MIDAZOLAM HYDROCHLORIDE 1 MG/ML
.25-5 INJECTION, SOLUTION INTRAMUSCULAR; INTRAVENOUS
Status: DISCONTINUED | OUTPATIENT
Start: 2017-09-06 | End: 2017-09-06 | Stop reason: HOSPADM

## 2017-09-06 RX ORDER — SODIUM CHLORIDE 9 MG/ML
50 INJECTION, SOLUTION INTRAVENOUS CONTINUOUS
Status: DISCONTINUED | OUTPATIENT
Start: 2017-09-06 | End: 2017-09-06 | Stop reason: HOSPADM

## 2017-09-06 RX ORDER — FENTANYL CITRATE 50 UG/ML
100 INJECTION, SOLUTION INTRAMUSCULAR; INTRAVENOUS
Status: DISCONTINUED | OUTPATIENT
Start: 2017-09-06 | End: 2017-09-06 | Stop reason: HOSPADM

## 2017-09-06 RX ORDER — FLUMAZENIL 0.1 MG/ML
0.2 INJECTION INTRAVENOUS
Status: DISCONTINUED | OUTPATIENT
Start: 2017-09-06 | End: 2017-09-06 | Stop reason: HOSPADM

## 2017-09-06 RX ORDER — DEXTROMETHORPHAN/PSEUDOEPHED 2.5-7.5/.8
1.2 DROPS ORAL
Status: DISCONTINUED | OUTPATIENT
Start: 2017-09-06 | End: 2017-09-06 | Stop reason: HOSPADM

## 2017-09-06 RX ORDER — PROPOFOL 10 MG/ML
INJECTION, EMULSION INTRAVENOUS AS NEEDED
Status: DISCONTINUED | OUTPATIENT
Start: 2017-09-06 | End: 2017-09-06 | Stop reason: HOSPADM

## 2017-09-06 RX ORDER — NALOXONE HYDROCHLORIDE 0.4 MG/ML
0.4 INJECTION, SOLUTION INTRAMUSCULAR; INTRAVENOUS; SUBCUTANEOUS
Status: DISCONTINUED | OUTPATIENT
Start: 2017-09-06 | End: 2017-09-06 | Stop reason: HOSPADM

## 2017-09-06 RX ORDER — SODIUM CHLORIDE 9 MG/ML
INJECTION, SOLUTION INTRAVENOUS
Status: DISCONTINUED | OUTPATIENT
Start: 2017-09-06 | End: 2017-09-06 | Stop reason: HOSPADM

## 2017-09-06 RX ADMIN — LIDOCAINE HYDROCHLORIDE 40 MG: 20 INJECTION, SOLUTION EPIDURAL; INFILTRATION; INTRACAUDAL; PERINEURAL at 11:01

## 2017-09-06 RX ADMIN — PROPOFOL 80 MG: 10 INJECTION, EMULSION INTRAVENOUS at 11:01

## 2017-09-06 RX ADMIN — SODIUM CHLORIDE: 9 INJECTION, SOLUTION INTRAVENOUS at 10:15

## 2017-09-06 RX ADMIN — PROPOFOL 140 MCG/KG/MIN: 10 INJECTION, EMULSION INTRAVENOUS at 11:01

## 2017-09-06 RX ADMIN — SIMETHICONE 80 MG: 20 SUSPENSION/ DROPS ORAL at 11:10

## 2017-09-06 NOTE — PROCEDURES
[address] [facility] Hospital[facility] Lopez MD  [phone]      2017    Esophagogastroduodenoscopy & Colonoscopy Procedure Note  Eliseo Ochoa  : 1945  Kevin Gurrola Medical Record Number: [id number]      Indications:    Iron deficiency anemia Iron deficiency anemia   Referring Physician:  Gisell Mcnamara NP  Anesthesia/Sedation: Conscious Sedation/Moderate Sedation/MAC  Endoscopist:  Dr. Amisha Quezada  Complications:  None  Estimated Blood Loss:  None    Permit:  The indications, risks, benefits and alternatives were reviewed with the patient or their decision maker who was provided an opportunity to ask questions and all questions were answered. The specific risks of esophagogastroduodenoscopy with conscious sedation were reviewed, including but not limited to anesthetic complication, bleeding, adverse drug reaction, missed lesion, infection, IV site reactions, and intestinal perforation which would lead to the need for surgical repair. Alternatives to EGD and colonoscopy including radiographic imaging, observation without testing, or laboratory testing were reviewed as well as the limitations of those alternatives discussed. After considering the options and having all their questions answered, the patient or their decision maker provided both verbal and written consent to proceed. -----------EGD------------   Procedure in Detail:  After obtaining informed consent, positioning of the patient in the left lateral decubitus position, and conduction of a pre-procedure pause or \"time out\" the endoscope was introduced into the mouth and advanced to the duodenum. A careful inspection was made, and findings or interventions are described below.     Findings:   Esophagus:normal  Stomach: normal   Duodenum/jejunum: normal        ----------Colonoscopy-----------    Procedure in Detail:  After obtaining informed consent, positioning of the patient in the left lateral decubitus position, and conduction of a pre-procedure pause or \"time out\" the endoscope was introduced into the anus and advanced to the cecum, which was identified by the ileocecal valve and appendiceal orifice. The quality of the colonic preparation was good. A careful inspection was made as the colonoscope was withdrawn, findings and interventions are described below. Findings:   Small 2mm cecum polyp removed with cold forceps. There is diverticulosis in the sigmoid colon without complications such as bleeding, inflammatory change, or luminal narrowing. In the rectum, medium internal hemorrhoids are noted without bleeding.      ------------------------------  Specimens:    See above    Complications:   None; patient tolerated the procedure well. Impressions:  EGD:  Normal  Colonoscopy: Small cecum polyp and diverticula/hemorrhoids  No findings sufficient to explain her anemia. Recommendations:     - Await pathology.  -Pillcam wireless video small intestinal endoscopy will be arranged. Thank you for entrusting me with this patient's care. Please do not hesitate to contact me with any questions or if I can be of assistance with any of your other patients' GI needs.     Signed By: Sylvia Montes MD                        September 6, 2017

## 2017-09-06 NOTE — INTERVAL H&P NOTE
Pre-Endoscopy H&P Update  Chief complaint/HPI/ROS:  The indication for the procedure, the patient's history and the patient's current medications are reviewed prior to the procedure and that data is reported on the H&P to which this document is attached. Any significant complaints with regard to organ systems will be noted, and if not mentioned then a review of systems is not contributory. Past Medical History:   Diagnosis Date    Anemia     Atrial fibrillation (HCC)     s/p pulm vein isolation 2015    Atrial fibrillation with rapid ventricular response ([address] 5/20/2017    Depression     Hypothyroidism       Past Surgical History:   Procedure Laterality Date    CARDIAC SURG PROCEDURE UNLIST      cardioversionx4    HX AFIB ABLATION  2016    HX CATARACT REMOVAL Left     HX CHOLECYSTECTOMY  08/26/2016    HX ORTHOPAEDIC Bilateral     arthroscopic knee surgery     Social   Social History   Substance Use Topics    Smoking status: Current Every Day Smoker     Packs/day: 1.00     Years: 50.00    Smokeless tobacco: Never Used    Alcohol use Yes      Comment: social      Family History   Problem Relation Age of Onset    Heart Disease Father     Cancer Paternal Aunt       No Known Allergies   Prior to Admission Medications   Prescriptions Last Dose Informant Patient Reported? Taking? RIVAROXABAN (XARELTO PO) 8/23/2017  Yes Yes   Sig: Take  by mouth.   citalopram (CELEXA) 20 mg tablet 9/6/2017 at Unknown time  Yes Yes   Sig: Take 20 mg by mouth daily. Indications: ANXIETY WITH DEPRESSION   levothyroxine (SYNTHROID) 88 mcg tablet 9/6/2017 at Unknown time Other Yes Yes   Sig: Take 88 mcg by mouth Daily (before breakfast). metoprolol tartrate (LOPRESSOR) 50 mg tablet 9/6/2017 at Unknown time Other No Yes   Sig: Take 1 Tab by mouth two (2) times a day. Facility-Administered Medications: None       PHYSICAL EXAM:  The patient is examined immediately prior to the procedure.   Visit Vitals    /68    Pulse (!) 55    Temp 98.8 °F (37.1 °C)    Resp 12    Ht 5' 5\" (1.651 m)    Wt 68.9 kg (152 lb)    SpO2 98%    Breastfeeding No    BMI 25.29 kg/m2     Gen: Appears comfortable, no distress. Pulm: comfortable respirations with no abnormal audible breath sounds  CV: heart regular, well perfused  GI: abdomen flat. PLAN:  Informed consent discussion held, patient afforded an opportunity to ask questions and all questions answered. After being advised of the risks, benefits, and alternatives, the patient requested that we proceed and indicated so on a written consent form. Will proceed with procedure as planned.   Armand Oneil MD

## 2017-09-06 NOTE — IP AVS SNAPSHOT
[address] [place] [address] 
[phone] Patient: Deronda Hodgkins MRN: [id number] IVK:[id number]/60/[id number] You are allergic to the following No active allergies Recent Documentation Height Weight Breastfeeding? BMI OB Status Smoking Status 1.651 m 68.9 kg No 25.29 kg/m2 Postmenopausal Current Every Day Smoker Emergency Contacts Name Discharge Info Relation Home Work Mobile [address] CAREGIVER [3] Child [2] [phone] About your hospitalization You were admitted on:  September 6, 2017 You last received care in the:  [facility] ENDOSCOPY You were discharged on:  September 6, 2017 Unit phone number:  [phone] Why you were hospitalized Your primary diagnosis was:  Not on File Providers Seen During Your Hospitalizations Provider Role Specialty Primary office phone Ernesto Fuentes MD Attending Provider Gastroenterology [phone] Your Primary Care Physician (PCP) Primary Care Physician Office Phone Office Fax Saurav Wall [phone] Follow-up Information None Your Appointments Friday September 08, 2017  1:00 PM EDT  
ESTABLISHED PATIENT with Merlinda Sellar, MD  
[facility] ([address]) [address]  
[phone] Current Discharge Medication List  
  
CONTINUE these medications which have NOT CHANGED Dose & Instructions Dispensing Information Comments Morning Noon Evening Bedtime  
 citalopram 20 mg tablet Commonly known as:  Drena Ritesh Your last dose was: Your next dose is:    
   
   
 Dose:  20 mg Take 20 mg by mouth daily. Indications: ANXIETY WITH DEPRESSION Refills:  0  
     
   
   
   
  
 levothyroxine 88 mcg tablet Commonly known as:  SYNTHROID  
   
 Your last dose was: Your next dose is:    
   
   
 Dose:  88 mcg Take 88 mcg by mouth Daily (before breakfast). Refills:  0  
     
   
   
   
  
 metoprolol tartrate 50 mg tablet Commonly known as:  LOPRESSOR Your last dose was: Your next dose is:    
   
   
 Dose:  50 mg Take 1 Tab by mouth two (2) times a day. Quantity:  180 Tab Refills:  3 XARELTO PO Your last dose was: Your next dose is: Take  by mouth. Refills:  0 Discharge Instructions [address] Basilio Bates. Ladan Elias, [address] [place] [address] 
[phone] September 6, 2017 Arian Like YOB: 1945 COLONOSCOPY DISCHARGE INSTRUCTIONS If there is redness at IV site you should apply warm compress to area. If redness or soreness persist contact Dr. Ladan Elias' or your primary care doctor. There may be a slight amount of blood passed from the rectum. Gaseous discomfort may develop, but walking, belching will help relieve this. You may not operate a vehicle for 12 hours You may not operate machinery or dangerous appliances for rest of today You may not drink alcoholic beverages for 12 hours Avoid making any critical decisions for 24 hours DIET: 
You may resume your normal diet, but some patients find that heavy or large meals may lead to indigestion or vomiting. I suggest a light meal as first food intake. MEDICATIONS: 
The use of some over-the-counter pain medication may lead to bleeding after colon biopsies or polyp removal.  Tylenol (also called acetaminophen) is safe to take even if you have had colonoscopy with polyp removal.  Based on the procedure you had today you may not safely take aspirin or aspirin-like products for the next ten (10) days.   Remember that Tylenol (also called acetaminophen) is safe to take after colonoscopy even if you have had biopsies or polyps removed. ACTIVITY: 
You may resume your normal household activities, but it is recommended that you spend the remainder of the day resting -  avoid any strenuous activity. CALL DR. Sheela Cortez' OFFICE IF: Increasing pain, nausea, vomiting Abdominal distension (swelling) Significant new or increased bleeding (oral or rectal) Fever/Chills Chest pain/shortness of breath Additional instructions:  
No aspirin 10 days We found one small/ tiny polyp in the colon but otherwise nothing else I removed the polyp and the next step is that you do the pillcam test. 
My office will contact you to arrange It was an honor to be your doctor today. Please let me or my office staff know if you have any feedback about today's procedure. Houston Baker MD 
 
Colonoscopy saves lives, and can prevent colon cancer. Everyone aged 48 or older needs colonoscopy. Tell your family and friends: get the test! 
 
 
 
 
Discharge Orders None Introducing [facility] SERVICES! [employer] introduces [employer] patient portal. Now you can access parts of your medical record, email your doctor's office, and request medication refills online. 1. In your internet browser, go to [email or web address] 2. Click on the First Time User? Click Here link in the Sign In box. You will see the New Member Sign Up page. 3. Enter your [employer] [facility] Access Code exactly as it appears below. You will not need to use this code after youve completed the sign-up process. If you do not sign up before the expiration date, you must request a new code. · [employer] [facility] Access Code: [email or web address] Expires: 9/27/2017  3:03 PM 
 
4. Enter the last four digits of your Social Security Number (xxxx) and Date of Birth (mm/dd/yyyy) as indicated and click Submit. You will be taken to the next sign-up page. 5. Create a [employer] ID. This will be your [employer] login ID and cannot be changed, so think of one that is secure and easy to remember. 6. Create a [employer] password. You can change your password at any time. 7. Enter your Password Reset Question and Answer. This can be used at a later time if you forget your password. 8. Enter your e-mail address. You will receive e-mail notification when new information is available in [address] 9. Click Sign Up. You can now view and download portions of your medical record. 10. Click the Download Summary menu link to download a portable copy of your medical information. If you have questions, please visit the Frequently Asked Questions section of the [employer] website. Remember, [employer] is NOT to be used for urgent needs. For medical emergencies, dial 911. Now available from your iPhone and Android! General Information Please provide this summary of care documentation to your next provider. Patient Signature:  ____________________________________________________________ Date:  ____________________________________________________________  
  
Rigoberto Finger Provider Signature:  ____________________________________________________________ Date:  ____________________________________________________________

## 2017-09-06 NOTE — ROUTINE PROCESS
[facility]  1945  [id number]    Situation:  Verbal report received from: Little Larson RN  Procedure: Procedure(s):  ESOPHAGOGASTRODUODENOSCOPY (EGD)  COLONOSCOPY  ENDOSCOPIC POLYPECTOMY    Background:    Preoperative diagnosis: anemia  Postoperative diagnosis: Diverticulosis, hemorrhoids  negative EGD    :  Dr. Chasidy Landaverde  Assistant(s): Endoscopy RN-1: Little Larson RN    Specimens:   ID Type Source Tests Collected by Time Destination   1 : Cecal polyp Preservative Cecum  Fernanda Ferrell MD 9/6/2017 1113 Pathology     H. Pylori  no    Assessment:  Intra-procedure medications     Anesthesia gave intra-procedure sedation and medications, see anesthesia flow sheet yes    Intravenous fluids: NS@ KVO     Vital signs stable     Abdominal assessment: round and soft     Recommendation:  Discharge patient per MD order. Family or Friend   Permission to share finding with family or friend yes    Endoscopy discharge instructions have been reviewed and given to patient and daughter. The patient and daughter verbalized understanding and acceptance of instructions.

## 2017-09-06 NOTE — H&P
74 y.o. female for open access colonoscopy for screening   Additional data for completion of the targeted pre-endoscopy H&P will be provided under 'H&P interval notes'. Please see that document which will be attached to this. Dean Trimble MD   She has AF and on anticoagulation had anemia. Plan is EGD, colonoscopy then pillcam.  She has withheld anticoagulation and would benefit from repeat h/h if that hasn't been done recently.   Dean Trimble MD

## 2017-09-06 NOTE — ANESTHESIA POSTPROCEDURE EVALUATION
Post-Anesthesia Evaluation and Assessment    Patient: Arian Lilly MRN: [id number]  SSN: [id number]    YOB: 1945  Age: 74 y.o. Sex: female       Cardiovascular Function/Vital Signs  Visit Vitals    /62    Pulse (!) 54    Temp 36.4 °C (97.5 °F)    Resp 16    Ht 5' 5\" (1.651 m)    Wt 68.9 kg (152 lb)    SpO2 99%    Breastfeeding No    BMI 25.29 kg/m2       Patient is status post MAC anesthesia for Procedure(s):  ESOPHAGOGASTRODUODENOSCOPY (EGD)  COLONOSCOPY  ENDOSCOPIC POLYPECTOMY. Nausea/Vomiting: None    Postoperative hydration reviewed and adequate. Pain:  Pain Scale 1: Numeric (0 - 10) (09/06/17 1127)  Pain Intensity 1: 0 (09/06/17 1127)   Managed    Neurological Status: At baseline    Mental Status and Level of Consciousness: Arousable    Pulmonary Status:   O2 Device: Room air (09/06/17 1127)   Adequate oxygenation and airway patent    Complications related to anesthesia: None    Post-anesthesia assessment completed.  No concerns    Signed By: Christian Ross MD     September 6, 2017

## 2017-09-06 NOTE — DISCHARGE INSTRUCTIONS
[facility] AMY Ackerman MD  [phone]      September 6, 2017    Casandra Colon  YOB: 1945    COLONOSCOPY DISCHARGE INSTRUCTIONS    If there is redness at IV site you should apply warm compress to area. If redness or soreness persist contact Dr. Yessy Ackerman' or your primary care doctor. There may be a slight amount of blood passed from the rectum. Gaseous discomfort may develop, but walking, belching will help relieve this. You may not operate a vehicle for 12 hours  You may not operate machinery or dangerous appliances for rest of today  You may not drink alcoholic beverages for 12 hours  Avoid making any critical decisions for 24 hours    DIET:  You may resume your normal diet, but some patients find that heavy or large meals may lead to indigestion or vomiting. I suggest a light meal as first food intake. MEDICATIONS:  The use of some over-the-counter pain medication may lead to bleeding after colon biopsies or polyp removal.  Tylenol (also called acetaminophen) is safe to take even if you have had colonoscopy with polyp removal.  Based on the procedure you had today you may not safely take aspirin or aspirin-like products for the next ten (10) days. Remember that Tylenol (also called acetaminophen) is safe to take after colonoscopy even if you have had biopsies or polyps removed. ACTIVITY:  You may resume your normal household activities, but it is recommended that you spend the remainder of the day resting -  avoid any strenuous activity.     CALL DR. Thao Washington' OFFICE IF:  Increasing pain, nausea, vomiting  Abdominal distension (swelling)  Significant new or increased bleeding (oral or rectal)  Fever/Chills  Chest pain/shortness of breath                       Additional instructions:   No aspirin 10 days  We found one small/ tiny polyp in the colon but otherwise nothing else  I removed the polyp and the next step is that you do the pillcam test.  My office will contact you to arrange     It was an honor to be your doctor today. Please let me or my office staff know if you have any feedback about today's procedure. Myla Cohen MD    Colonoscopy saves lives, and can prevent colon cancer. Everyone aged 48 or older needs colonoscopy.   Tell your family and friends: get the test!

## 2017-09-06 NOTE — PERIOP NOTES
Patient tolerated procedure without problems. Abdomen soft and patient arousable and voices no complaints Report received from CRNA, see anesthesia note. Patient transported to endoscopy recovery area. Bedside report given to [employer] [place], RN.

## 2017-09-06 NOTE — ANESTHESIA PREPROCEDURE EVALUATION
Anesthetic History   No history of anesthetic complications            Review of Systems / Medical History  Patient summary reviewed, nursing notes reviewed and pertinent labs reviewed    Pulmonary  Within defined limits                 Neuro/Psych   Within defined limits           Cardiovascular  Within defined limits          Dysrhythmias : atrial fibrillation      Exercise tolerance: >4 METS     GI/Hepatic/Renal  Within defined limits              Endo/Other  Within defined limits    Hypothyroidism  Anemia     Other Findings   Comments: Hgb 7           Physical Exam    Airway  Mallampati: II    Neck ROM: normal range of motion   Mouth opening: Normal     Cardiovascular  Regular rate and rhythm,  S1 and S2 normal,  no murmur, click, rub, or gallop  Rhythm: regular  Rate: normal         Dental  No notable dental hx       Pulmonary  Breath sounds clear to auscultation               Abdominal  GI exam deferred       Other Findings            Anesthetic Plan    ASA: 2  Anesthesia type: MAC          Induction: Intravenous  Anesthetic plan and risks discussed with: Patient

## 2017-09-08 ENCOUNTER — OFFICE VISIT (OUTPATIENT)
Dept: CARDIOLOGY CLINIC | Age: 72
End: 2017-09-08

## 2017-09-08 VITALS
OXYGEN SATURATION: 99 % | SYSTOLIC BLOOD PRESSURE: 120 MMHG | HEART RATE: 64 BPM | WEIGHT: 154 LBS | RESPIRATION RATE: 16 BRPM | BODY MASS INDEX: 25.66 KG/M2 | DIASTOLIC BLOOD PRESSURE: 70 MMHG | HEIGHT: 65 IN

## 2017-09-08 DIAGNOSIS — Z86.79 S/P ABLATION OF ATRIAL FIBRILLATION: ICD-10-CM

## 2017-09-08 DIAGNOSIS — I48.0 PAROXYSMAL A-FIB (HCC): Primary | ICD-10-CM

## 2017-09-08 DIAGNOSIS — D62 ACUTE BLOOD LOSS ANEMIA: ICD-10-CM

## 2017-09-08 DIAGNOSIS — Z98.890 S/P ABLATION OF ATRIAL FIBRILLATION: ICD-10-CM

## 2017-09-08 DIAGNOSIS — D64.9 SYMPTOMATIC ANEMIA: ICD-10-CM

## 2017-09-08 NOTE — PROGRESS NOTES
Laureen Mattson [address], [address]  [address]  [place], [address] [facility]    Office [phone]  Fax [phone]  Cell [phone]        Jojo Ness is a 74 y.o. female last seen 2 weeks ago. Assessment  Encounter Diagnoses   Name Primary?  Paroxysmal a-fib (HCC) Yes    S/P ablation of atrial fibrillation     Acute blood loss anemia     Symptomatic anemia        Recommendations:    Jojo Ness has iron deficiency anemia without evidence of blood loss by recent endoscopic evaluation. It would not be prudent to resume anticoagulation without further investigation with PillCam. I have asked her to touch base with Dr. Cherrie Bonilla to discuss further. If this is negative, would suggest hematology evaluation. She has had no sxs of recurrent AF. I will refer her to Dr. Rachel Kevin to discuss rhythm control strategies. If she is indeed felt to be high risk for recurrent GI bleeding, would consider implantation of Watchman. Her pulse remains regular today. Subjective:    She was found to have severe anemia with hemoglobin of 7. Endoscopic evaluation by Dr. Cherrie Bonilla was negative. A PillCam evaluation is pending. She was transfused in the hospital and recent hemoglobin was 10. She was taken off Xarelto with the diagnosis of anemia. She has not been placed on iron. Her MARY is much improved following transfusion. Patient has not seen anyone since her appointment with Dr. Cherrie Bonilla and is unsure when she is supposed to follow up next for her anemia. She notes that Dr. Mechelle Mcgowan recommended following up with a hematologist bu she has not made an appointment. She denies any blood in her stool, exertional chest pain, palpitations, syncope, orthopnea, edema or paroxysmal nocturnal dyspnea. Cardiac risk factors   HTN no  DM no  Smoking yes- 1/2 pack per day.    Family hx of CAD yes    Cardiac testing  Lexiscan Cardiolite 5/22/17- normal perfusion, LVEF 76%  Echo 5/21/16 LVEF 60%, mild MR, moderate LAE    Past Medical History:   Diagnosis Date    Anemia     Atrial fibrillation (HCC)     s/p pulm vein isolation 2015    Atrial fibrillation with rapid ventricular response ([facility]) 5/20/2017    Depression     Hypothyroidism         Current Outpatient Prescriptions   Medication Sig Dispense Refill    citalopram (CELEXA) 20 mg tablet Take 20 mg by mouth daily. Indications: ANXIETY WITH DEPRESSION      metoprolol tartrate (LOPRESSOR) 50 mg tablet Take 1 Tab by mouth two (2) times a day. 180 Tab 3    levothyroxine (SYNTHROID) 88 mcg tablet Take 88 mcg by mouth Daily (before breakfast).  RIVAROXABAN (XARELTO PO) Take  by mouth. No Known Allergies       Review of Systems  Constitutional: Negative for fever, chills and diaphoresis. Respiratory: Negative for cough, hemoptysis, sputum production and wheezing. Positive for MARY. Cardiovascular: Negative for chest pain,  orthopnea, claudication, leg swelling and PND. Gastrointestinal: Negative for heartburn, nausea, vomiting, blood in stool and melena. Genitourinary: Negative for dysuria and flank pain. Musculoskeletal: Negative for joint pain and back pain. Skin: Negative for rash. Neurological: Negative for focal weakness, seizures, loss of consciousness, weakness and headaches. Endo/Heme/Allergies: Does not bruise/bleed easily. Psychiatric/Behavioral: Negative for memory loss. The patient does not have insomnia. Physical Exam    Visit Vitals    /70 (BP 1 Location: Left arm, BP Patient Position: Sitting)    Pulse 64    Resp 16    Ht 5' 5\" (1.651 m)    Wt 154 lb (69.9 kg)    SpO2 99%    BMI 25.63 kg/m2     Wt Readings from Last 3 Encounters:   09/08/17 154 lb (69.9 kg)   09/06/17 152 lb (68.9 kg)   08/23/17 152 lb (68.9 kg)      General - well developed well nourished but pale  Neck - JVP normal, thyroid nl  Cardiac - normal S1, S2, no murmurs, rubs or gallops.  No clicks  Vascular - carotids without bruits, radials, femorals and pedal pulses equal bilateral  Lungs - clear to auscultation bilaterals, no rales, wheezing or rhonchi  Abd - soft nontender, no HSM, no abd bruits  Extremities - no edema  Skin - no rash  Neuro - nonfocal  Psych - normal mood and affect    Cardiographics  Lexiscan Cardiolite 05/22/17- normal perfusion, LVEF 76%  Echo 05/21/17- LVEF 60%, moderate LAE mild MR   EKG 06/05/17- SR, 56 intemittent PVCs, otherwise normal   EKG 8/22/17- SR 69, ventricular bigeminy    Written by Karthik Lake, as dictated by Sabi Isbell MD.   Lebron Nunes

## 2017-09-08 NOTE — MR AVS SNAPSHOT
Visit Information Date & Time Provider Department Dept. Phone Encounter #  
 9/8/2017  1:00 PM Yolie Oates MD [facility] [phone] [id number] Your Appointments 10/11/2017  9:40 AM  
New Patient with Merry Tse MD  
[facility] ([facility]) Appt Note: per Dr. Atul Ley dx-?  
 320 [facility] 600 1007 [address] [address] [address] Upcoming Health Maintenance Date Due ZOSTER VACCINE AGE 60> 3/16/2003 GLAUCOMA SCREENING Q2Y 5/16/2008 OSTEOPOROSIS SCREENING (DEXA) 5/16/2008 Pneumococcal 65+ Low/Medium Risk (1 of 2 - PCV13) 5/16/2008 MEDICARE YEARLY EXAM 5/16/2008 INFLUENZA AGE 9 TO ADULT 8/1/2017 FOBT Q 1 YEAR AGE 50-75 8/23/2018 DTaP/Tdap/Td series (2 - Td) 6/7/2027 Allergies as of 9/8/2017  Review Complete On: 9/8/2017 By: James Crawford No Known Allergies Current Immunizations  Never Reviewed Name Date Tdap 6/7/2017 12:05 AM  
  
 Not reviewed this visit You Were Diagnosed With   
  
 Codes Comments Paroxysmal a-fib (HCC)    -  Primary ICD-10-CM: I48.0 ICD-9-CM: 427.31 S/P ablation of atrial fibrillation     ICD-10-CM: Z98.890, Z86.79 
ICD-9-CM: V45.89 Acute blood loss anemia     ICD-10-CM: D62 
ICD-9-CM: 285.1 Symptomatic anemia     ICD-10-CM: D64.9 ICD-9-CM: 893. 9 Vitals BP Pulse Resp Height(growth percentile) Weight(growth percentile) SpO2  
 120/70 (BP 1 Location: Left arm, BP Patient Position: Sitting) 64 16 5' 5\" (1.651 m) 154 lb (69.9 kg) 99% BMI OB Status Smoking Status 25.63 kg/m2 Postmenopausal Current Every Day Smoker BMI and BSA Data Body Mass Index Body Surface Area  
 25.63 kg/m 2 1.79 m 2 Preferred Pharmacy Pharmacy Name Phone [facility] [address] AT [address] [phone] Your Updated Medication List  
  
   
This list is accurate as of: 9/8/17  1:34 PM.  Always use your most recent med list.  
  
  
  
  
 citalopram 20 mg tablet Commonly known as:  Jessica Kashif Take 20 mg by mouth daily. Indications: ANXIETY WITH DEPRESSION  
  
 levothyroxine 88 mcg tablet Commonly known as:  SYNTHROID Take 88 mcg by mouth Daily (before breakfast). metoprolol tartrate 50 mg tablet Commonly known as:  LOPRESSOR Take 1 Tab by mouth two (2) times a day. XARELTO PO Take  by mouth. Patient Instructions See Dr. Aiyana Lane in 3-4 weeks. See Dr. Gilmer Mark to discuss PillCam.   
 
 
  
Introducing [facility]! Suzy Ayala introduces Exepron patient portal. Now you can access parts of your medical record, email your doctor's office, and request medication refills online. 1. In your internet browser, go to [email or web address] 2. Click on the First Time User? Click Here link in the Sign In box. You will see the New Member Sign Up page. 3. Enter your Exepron Access Code exactly as it appears below. You will not need to use this code after youve completed the sign-up process. If you do not sign up before the expiration date, you must request a new code. · Exepron Access Code: [email or web address] Expires: 9/27/2017  3:03 PM 
 
4. Enter the last four digits of your Social Security Number (xxxx) and Date of Birth (mm/dd/yyyy) as indicated and click Submit. You will be taken to the next sign-up page. 5. Create a Exepron ID. This will be your Exepron login ID and cannot be changed, so think of one that is secure and easy to remember. 6. Create a Exepron password. You can change your password at any time. 7. Enter your Password Reset Question and Answer. This can be used at a later time if you forget your password. 8. Enter your e-mail address. You will receive e-mail notification when new information is available in [address] 9. Click Sign Up. You can now view and download portions of your medical record. 10. Click the Download Summary menu link to download a portable copy of your medical information. If you have questions, please visit the Frequently Asked Questions section of the Relevant e-solution website. Remember, Relevant e-solution is NOT to be used for urgent needs. For medical emergencies, dial 911. Now available from your iPhone and Android! Please provide this summary of care documentation to your next provider. Your primary care clinician is listed as Garcia Coins. If you have any questions after today's visit, please call [phone].

## 2017-09-15 ENCOUNTER — TELEPHONE (OUTPATIENT)
Dept: CARDIOLOGY CLINIC | Age: 72
End: 2017-09-15

## 2017-09-15 NOTE — TELEPHONE ENCOUNTER
Pt would like to speak with you regarding being off her blood thinner. Pt would like to try and get in to see Dr. Pat Bauer within the next two weeks to speak about this. Pt can be reached at [phone].     Thank you,  Malachi Gold

## 2017-09-15 NOTE — TELEPHONE ENCOUNTER
Patient states she recently saw Dr. Natalie Purdy and has been cleared by him. She is scheduled for a pillcam 9/21/17. Will request records. HGB drawn 9/15/17 11.3.

## 2017-09-18 ENCOUNTER — TELEPHONE (OUTPATIENT)
Dept: CARDIOLOGY CLINIC | Age: 72
End: 2017-09-18

## 2017-09-18 NOTE — TELEPHONE ENCOUNTER
Patient states that she would like to discuss her concerns about being off of the blood thinner. States that she would like to speak with someone today. Can be reached at [phone]. Thanks!

## 2017-09-18 NOTE — TELEPHONE ENCOUNTER
Patient inquiring if she can take a baby aspirin while we wait for the pillcam results. Per MD, will await pillcam results prior to making any changes. She voices understanding.

## 2017-09-26 NOTE — TELEPHONE ENCOUNTER
Report received. Per MD, patient is to remain off anticoagulation and follow-up with Dr. Amy Acosta.

## 2017-10-11 ENCOUNTER — CLINICAL SUPPORT (OUTPATIENT)
Dept: CARDIOLOGY CLINIC | Age: 72
End: 2017-10-11

## 2017-10-11 ENCOUNTER — OFFICE VISIT (OUTPATIENT)
Dept: CARDIOLOGY CLINIC | Age: 72
End: 2017-10-11

## 2017-10-11 VITALS
HEART RATE: 50 BPM | OXYGEN SATURATION: 95 % | SYSTOLIC BLOOD PRESSURE: 110 MMHG | BODY MASS INDEX: 25.66 KG/M2 | WEIGHT: 154 LBS | DIASTOLIC BLOOD PRESSURE: 80 MMHG | HEIGHT: 65 IN

## 2017-10-11 DIAGNOSIS — I48.0 PAF (PAROXYSMAL ATRIAL FIBRILLATION) (HCC): Primary | ICD-10-CM

## 2017-10-11 DIAGNOSIS — I48.0 PAROXYSMAL A-FIB (HCC): Primary | ICD-10-CM

## 2017-10-11 NOTE — PROGRESS NOTES
Applied 48 hr [employer] holter per Dr Di Luque dx: AF, palps, fatigue. Pt has #[id number] & is due back on Fri 10/13/17.

## 2017-10-11 NOTE — PATIENT INSTRUCTIONS
Treatment Plan:  · 48 hour cardiac monitor  · Recommendation for further treatment following monitor review    Holter Monitoring: About This Test  What is it? A Holter monitor is a small machine that records the electrical activity of your heart. You wear it for 24 to 48 hours while you do all your normal activities. The monitor has wires that attach to small electrode pads. These pads are taped to your chest.  This kind of machine has many different names. It is sometimes called an ambulatory monitor, an ambulatory electrocardiogram, or an ambulatory EKG. It can also be called a 24-hour EKG or a cardiac event monitor. Why is this test done? You may have this test to find out if you have a problem with your heart. Many heart problems can only be noticed when you are doing something. They may happen when you exercise, eat, have sex, or sleep. Or they may happen when you have a bowel movement or you feel stressed. Your Holter monitor will record the way your heart beats during all of these activities. Holter monitoring also will:  · Look for what may cause chest pain, dizziness, or fainting. · Check to see if treatment for an irregular heartbeat is working. How can you prepare for the test?  · Before the test, talk to your doctor about all your health problems. Tell him or her about all the medicines and vitamins you take. · Take a shower or bath before the pads are put onto your chest. You must not get the pads wet during the test.  · Wear a loose blouse or shirt. · Do not wear jewelry or clothes with metal buttons or kofi. · If you are a woman, do not wear an underwire bra. What happens before the test?  · Areas of your chest may be shaved and cleaned. · The electrode pads are attached to your chest with a paste or gel. · You wear the monitor on a strap over your shoulder or around your waist. It uses batteries and does not weigh much.   What happens during the test?  · You need to record your activities and symptoms. You will write down the time your symptoms started. And you will write down what type of activity you were doing. · You can use the clock on the monitor to help you keep track of the time your symptoms started. · When you sleep, try to stay on your back with the monitor at your side. This will prevent the pads from coming off. What else should you know about the test?  · When you wear the monitor, try to stay away from magnets, metal detectors, high-voltage areas, garage door openers, microwave ovens, and electric blankets. · Do not use an electric toothbrush or shaver. · The pads may make your skin itch a little. And your skin may look or feel irritated after the pads are removed. How long does the test take? · You usually wear the monitor for 24 to 48 hours. What happens after the test?  · You may return to the doctor's office or hospital to have the pads removed. Or you may be able to take them off yourself. · You will return the Holter monitor to your doctor's office or hospital.  · You can go back to your usual activities right away. Where can you learn more? Go to http://martínez-renny.info/. Enter Y929 in the search box to learn more about \"Holter Monitoring: About This Test.\"  Current as of: April 3, 2017  Content Version: 11.3  © 4043-2024 [employer]. Care instructions adapted under license by [employer] (which disclaims liability or warranty for this information). If you have questions about a medical condition or this instruction, always ask your healthcare professional. [place] [address] any warranty or liability for your use of this information.

## 2017-10-11 NOTE — PROGRESS NOTES
HISTORY OF PRESENTING ILLNESS      Alfredo Vazquez is a 74 y.o. female with paroxysmal atrial fibrillation status post pulmonary vein isolation in 1 in [place], hypothyroidism and severe anemia. She was previously on rivaroxaban for CVA risk reduction of her atrial fibrillation however this was discontinued 1 month ago. She has undergone multiple iron transfusions for anemia in the past including 3 transfusions last month. She continues to express palpitations with her atrial fibrillation on a daily basis. She is currently on metoprolol and her resting heart rate is 50 bpm.  EKG today shows sinus rhythm with a PVC. Nuclear stress test in 5/2017 demonstrated preserved LV function without ischemia. She is felt to be at elevated risk for bleeding. Her HASBLED is 2.         ACTIVE PROBLEM LIST     Patient Active Problem List    Diagnosis Date Noted    Acute blood loss anemia 08/23/2017    Symptomatic anemia 08/23/2017    Dyspnea 08/23/2017    Fatigue 08/23/2017    Hypothyroid 08/23/2017    Cigarette nicotine dependence without complication 80/56/6491    Dental infection 05/20/2017    Calculus of gallbladder with chronic cholecystitis without obstruction 08/15/2016    S/P ablation of atrial fibrillation 08/15/2016    Hashimoto's thyroiditis 08/15/2016    Irritable bowel syndrome with diarrhea 08/15/2016    Paroxysmal a-fib ([address] 09/24/2015           PAST MEDICAL HISTORY     Past Medical History:   Diagnosis Date    Anemia     Atrial fibrillation ([address]     s/p pulm vein isolation 2015    Atrial fibrillation with rapid ventricular response ([address] 5/20/2017    Depression     Hypothyroidism            PAST SURGICAL HISTORY     Past Surgical History:   Procedure Laterality Date    CARDIAC SURG PROCEDURE UNLIST      cardioversionx4    COLONOSCOPY N/A 9/6/2017    [facility] HX AFIB ABLATION  2016    HX CATARACT REMOVAL Left     HX CHOLECYSTECTOMY  08/26/2016    HX ORTHOPAEDIC Bilateral     arthroscopic knee surgery          ALLERGIES     No Known Allergies       FAMILY HISTORY     Family History   Problem Relation Age of Onset    Heart Disease Father     Cancer Paternal Aunt     negative for cardiac disease       SOCIAL HISTORY     Social History     Social History    Marital status: SINGLE     Spouse name: N/A    Number of children: N/A    Years of education: N/A     Social History Main Topics    Smoking status: Current Every Day Smoker     Packs/day: 1.00     Years: 50.00    Smokeless tobacco: Never Used    Alcohol use Yes      Comment: social    Drug use: No    Sexual activity: Not Currently     Partners: Male     Other Topics Concern    Not on file     Social History Narrative         MEDICATIONS     Current Outpatient Prescriptions   Medication Sig    RIVAROXABAN (XARELTO PO) Take  by mouth.  citalopram (CELEXA) 20 mg tablet Take 20 mg by mouth daily. Indications: ANXIETY WITH DEPRESSION    metoprolol tartrate (LOPRESSOR) 50 mg tablet Take 1 Tab by mouth two (2) times a day.  levothyroxine (SYNTHROID) 88 mcg tablet Take 88 mcg by mouth Daily (before breakfast). No current facility-administered medications for this visit. I have reviewed the nurses notes, vitals, problem list, allergy list, medical history, family, social history and medications. REVIEW OF SYMPTOMS      General: Pt denies excessive weight gain or loss. Pt is able to conduct ADL's  HEENT: Denies blurred vision, headaches, hearing loss, epistaxis and difficulty swallowing. Respiratory: Denies cough, congestion, shortness of breath, MARY, wheezing or stridor.   Cardiovascular: Denies precordial pain, palpitations, edema or PND  Gastrointestinal: Denies poor appetite, indigestion, abdominal pain or blood in stool  Genitourinary: Denies hematuria, dysuria, increased urinary frequency  Musculoskeletal: Denies joint pain or swelling from muscles or joints  Neurologic: Denies tremor, paresthesias, headache, or sensory motor disturbance  Psychiatric: Denies confusion, insomnia, depression  Integumentray: Denies rash, itching or ulcers. Hematologic: Denies easy bruising, bleeding     PHYSICAL EXAMINATION      There were no vitals filed for this visit. General: Well developed, in no acute distress. HEENT: No jaundice, oral mucosa moist, no oral ulcers  Neck: Supple, no stiffness, no lymphadenopathy, supple  Heart:  Normal S1/S2 negative S3 or S4. Regular, no murmur, gallop or rub, no jugular venous distention  Respiratory: Clear bilaterally x 4, no wheezing or rales  Abdomen:   Soft, non-tender, bowel sounds are active.   Extremities:  No edema, normal cap refill, no cyanosis. Musculoskeletal: No clubbing, no deformities  Neuro: A&Ox3, speech clear, gait stable, cooperative, no focal neurologic deficits  Skin: Skin color is normal. No rashes or lesions. Non diaphoretic, moist.  Vascular: 2+ pulses symmetric in all extremities       DIAGNOSTIC DATA      EKG: Sinus rhythm with PVC       LABORATORY DATA      Lab Results   Component Value Date/Time    WBC 6.7 08/24/2017 01:50 AM    HGB 7.0 08/24/2017 01:50 AM    HCT 23.4 08/24/2017 01:50 AM    PLATELET 169 86/54/0805 01:50 AM    MCV 87.3 08/24/2017 01:50 AM      Lab Results   Component Value Date/Time    Sodium 141 08/24/2017 01:50 AM    Potassium 4.4 08/24/2017 01:50 AM    Chloride 110 08/24/2017 01:50 AM    CO2 25 08/24/2017 01:50 AM    Anion gap 6 08/24/2017 01:50 AM    Glucose 94 08/24/2017 01:50 AM    BUN 22 08/24/2017 01:50 AM    Creatinine 0.93 08/24/2017 01:50 AM    BUN/Creatinine ratio 24 08/24/2017 01:50 AM    GFR est AA >60 08/24/2017 01:50 AM    GFR est non-AA 59 08/24/2017 01:50 AM    Calcium 8.6 08/24/2017 01:50 AM    Bilirubin, total 0.3 08/24/2017 01:50 AM    AST (SGOT) 10 08/24/2017 01:50 AM    Alk.  phosphatase 58 08/24/2017 01:50 AM    Protein, total 6.0 08/24/2017 01:50 AM    Albumin 2.9 08/24/2017 01:50 AM    Globulin 3.1 08/24/2017 01:50 AM    A-G Ratio 0.9 08/24/2017 01:50 AM    ALT (SGPT) 13 08/24/2017 01:50 AM           ASSESSMENT      1. Atrial fibrillation    A. Paroxysmal   B. PVI 2015   C. HASBLED 2  2. Severe anemia  3. Hypothyroidism  4. Premature ventricular contractions  5. Diverticulosis       PLAN     Patient is at elevated risk for stroke as well as bleeding. Her anticoagulant has been discontinued and she is felt to be at high risk for reinitiation of this. Will consider for left atrial appendage occlusion to mitigate her stroke risk. We will plan for MARIA L to evaluate left atrial appendage size/morphology with Dr. Paulette Lea. Will arrange for a 40 hour Holter monitor to evaluate whether her palpitations are secondary to her atrial fibrillation or an alternate etiology. Should she be observed to have atrial fibrillation, would recommend a trial of antiarrhythmic drug therapy rather than AF ablation given she will be intolerant of anticoagulation post ablation. Will consider initiation of flecainide 50 mg twice daily and lower her metoprolol dose in half should AF be identified on her Holter monitor. Should she have worsening bradycardia, a pacemaker may be considered in the future. FOLLOW-UP   To be determined        Thank you,  Katharine Chong NP and Dr. Paulette Lea for involving me in the care of this extraordinarily pleasant female. Please do not hesitate to contact me for further questions/concerns.          Mary Osorio MD  Cardiac Electrophysiology / Cardiology    [address]  [address] [place] [address], [facility], [address]  [phone] / [phone] Fax   [phone] / [phone] Fax

## 2017-10-24 RX ORDER — METOPROLOL TARTRATE 50 MG/1
50 TABLET ORAL 2 TIMES DAILY
Qty: 180 TAB | Refills: 3 | Status: SHIPPED | OUTPATIENT
Start: 2017-10-24 | End: 2017-10-25 | Stop reason: SDUPTHER

## 2017-10-25 RX ORDER — METOPROLOL TARTRATE 25 MG/1
25 TABLET, FILM COATED ORAL 2 TIMES DAILY
Qty: 60 TAB | Refills: 3
Start: 2017-10-25 | End: 2017-11-03 | Stop reason: ALTCHOICE

## 2017-10-25 RX ORDER — FLECAINIDE ACETATE 50 MG/1
50 TABLET ORAL 2 TIMES DAILY
Qty: 60 TAB | Refills: 3 | Status: SHIPPED | OUTPATIENT
Start: 2017-10-25 | End: 2017-11-03

## 2017-10-25 NOTE — TELEPHONE ENCOUNTER
Per Dr. Celina Mcfarlane, decrease metoprolol to 25mg twice daily and start flecainide 50mg twice daily. Instructed patient on new medication instructions. Understanding expressed.

## 2017-11-03 ENCOUNTER — OFFICE VISIT (OUTPATIENT)
Dept: CARDIOLOGY CLINIC | Age: 72
End: 2017-11-03

## 2017-11-03 VITALS
BODY MASS INDEX: 26.06 KG/M2 | HEIGHT: 65 IN | OXYGEN SATURATION: 99 % | WEIGHT: 156.4 LBS | HEART RATE: 60 BPM | RESPIRATION RATE: 16 BRPM | SYSTOLIC BLOOD PRESSURE: 122 MMHG | DIASTOLIC BLOOD PRESSURE: 74 MMHG

## 2017-11-03 DIAGNOSIS — I48.0 PAROXYSMAL A-FIB (HCC): Primary | ICD-10-CM

## 2017-11-03 NOTE — PATIENT INSTRUCTIONS
Treatment Plan:  · Discontinue metoprolol (Lopressor). · Echocardiogram in 1 year. · Follow up in in 1 year. · Contact our office with any concerns.

## 2017-11-03 NOTE — PROGRESS NOTES
HISTORY OF PRESENTING ILLNESS      Ewelina Bedoya is a 74 y.o. female with paroxysmal atrial fibrillation status post pulmonary vein isolation in 1 in [place], hypothyroidism and severe anemia. She was previously on rivaroxaban for CVA risk reduction of her atrial fibrillation however this was discontinued 1 month ago. She has undergone multiple iron transfusions for anemia in the past including 3 transfusions last month. She continues to express palpitations with her atrial fibrillation on a daily basis. She is currently on metoprolol and her resting heart rate is 50 bpm.  EKG last visit demonstrated sinus rhythm with a PVC. Nuclear stress test in 5/2017 demonstrated preserved LV function without ischemia. She is felt to be at elevated risk for bleeding. Her HASBLED is 2. She underwent holter monitor which demonstrated 20% PVC burden and failed to reveal atrial fibrillation. Flecainide 50mg BID was ordered however she is hesitant to start this after researching side effects. She continues to feel palpitations.       ACTIVE PROBLEM LIST     Patient Active Problem List    Diagnosis Date Noted    Acute blood loss anemia 08/23/2017    Symptomatic anemia 08/23/2017    Dyspnea 08/23/2017    Fatigue 08/23/2017    Hypothyroid 08/23/2017    Cigarette nicotine dependence without complication 90/98/9140    Dental infection 05/20/2017    Calculus of gallbladder with chronic cholecystitis without obstruction 08/15/2016    S/P ablation of atrial fibrillation 08/15/2016    Hashimoto's thyroiditis 08/15/2016    Irritable bowel syndrome with diarrhea 08/15/2016    Paroxysmal a-fib ([address] 09/24/2015           PAST MEDICAL HISTORY     Past Medical History:   Diagnosis Date    Anemia     Atrial fibrillation ([address]     s/p pulm vein isolation 2015    Atrial fibrillation with rapid ventricular response ([address] 5/20/2017    Depression     Hypothyroidism            PAST SURGICAL HISTORY     Past Surgical History:   Procedure Laterality Date    CARDIAC SURG PROCEDURE UNLIST      cardioversionx4    COLONOSCOPY N/A 9/6/2017    COLONOSCOPY performed by Migue Chau MD at [facility] HX AFIB ABLATION  2016    HX CATARACT REMOVAL Left     HX CHOLECYSTECTOMY  08/26/2016    HX ORTHOPAEDIC Bilateral     arthroscopic knee surgery          ALLERGIES     No Known Allergies       FAMILY HISTORY     Family History   Problem Relation Age of Onset    Heart Disease Father     Cancer Paternal Aunt     negative for cardiac disease       SOCIAL HISTORY     Social History     Social History    Marital status: SINGLE     Spouse name: N/A    Number of children: N/A    Years of education: N/A     Social History Main Topics    Smoking status: Current Every Day Smoker     Packs/day: 1.00     Years: 50.00    Smokeless tobacco: Never Used      Comment: about . 75 packs per day    Alcohol use Yes      Comment: rare    Drug use: No    Sexual activity: Not Currently     Partners: Male     Other Topics Concern    None     Social History Narrative         MEDICATIONS     Current Outpatient Prescriptions   Medication Sig    metoprolol tartrate (LOPRESSOR) 25 mg tablet Take 1 Tab by mouth two (2) times a day.  levothyroxine (SYNTHROID) 88 mcg tablet Take 88 mcg by mouth Daily (before breakfast). No current facility-administered medications for this visit. I have reviewed the nurses notes, vitals, problem list, allergy list, medical history, family, social history and medications. REVIEW OF SYMPTOMS      General: Pt denies excessive weight gain or loss. Pt is able to conduct ADL's  HEENT: Denies blurred vision, headaches, hearing loss, epistaxis and difficulty swallowing. Respiratory: Denies cough, congestion, shortness of breath, MARY, wheezing or stridor.   Cardiovascular: +palpitations, Denies precordial pain,  edema or PND  Gastrointestinal: Denies poor appetite, indigestion, abdominal pain or blood in stool  Genitourinary: Denies hematuria, dysuria, increased urinary frequency  Musculoskeletal: Denies joint pain or swelling from muscles or joints  Neurologic: Denies tremor, paresthesias, headache, or sensory motor disturbance  Psychiatric: Denies confusion, insomnia, depression  Integumentray: Denies rash, itching or ulcers. Hematologic: Denies easy bruising, bleeding       PHYSICAL EXAMINATION      Vitals:    11/03/17 0820   BP: 122/74   Pulse: 60   Resp: 16   SpO2: 99%   Weight: 156 lb 6.4 oz (70.9 kg)   Height: 5' 5\" (1.651 m)     General: Well developed, in no acute distress. HEENT: No jaundice, oral mucosa moist, no oral ulcers  Neck: Supple, no stiffness, no lymphadenopathy, supple  Heart:  Normal S1/S2 negative S3 or S4. Regular, no murmur, gallop or rub, no jugular venous distention  Respiratory: Clear bilaterally x 4, no wheezing or rales  Abdomen:   Soft, non-tender, bowel sounds are active.   Extremities:  No edema, normal cap refill, no cyanosis. Musculoskeletal: No clubbing, no deformities  Neuro: A&Ox3, speech clear, gait stable, cooperative, no focal neurologic deficits  Skin: Skin color is normal. No rashes or lesions.  Non diaphoretic, moist.  Vascular: 2+ pulses symmetric in all extremities       DIAGNOSTIC DATA      EKG:        LABORATORY DATA      Lab Results   Component Value Date/Time    WBC 6.7 08/24/2017 01:50 AM    HGB 7.0 08/24/2017 01:50 AM    HCT 23.4 08/24/2017 01:50 AM    PLATELET 818 62/42/8998 01:50 AM    MCV 87.3 08/24/2017 01:50 AM      Lab Results   Component Value Date/Time    Sodium 141 08/24/2017 01:50 AM    Potassium 4.4 08/24/2017 01:50 AM    Chloride 110 08/24/2017 01:50 AM    CO2 25 08/24/2017 01:50 AM    Anion gap 6 08/24/2017 01:50 AM    Glucose 94 08/24/2017 01:50 AM    BUN 22 08/24/2017 01:50 AM    Creatinine 0.93 08/24/2017 01:50 AM    BUN/Creatinine ratio 24 08/24/2017 01:50 AM    GFR est AA >60 08/24/2017 01:50 AM    GFR est non-AA 59 08/24/2017 01:50 AM Calcium 8.6 08/24/2017 01:50 AM    Bilirubin, total 0.3 08/24/2017 01:50 AM    AST (SGOT) 10 08/24/2017 01:50 AM    Alk. phosphatase 58 08/24/2017 01:50 AM    Protein, total 6.0 08/24/2017 01:50 AM    Albumin 2.9 08/24/2017 01:50 AM    Globulin 3.1 08/24/2017 01:50 AM    A-G Ratio 0.9 08/24/2017 01:50 AM    ALT (SGPT) 13 08/24/2017 01:50 AM           ASSESSMENT      1. Atrial fibrillation                                         A. Paroxysmal                        B. PVI 2015                        C. HASBLED 2  2. Severe anemia  3. Hypothyroidism  4. Premature ventricular contractions   A. Symptomatic  5. Diverticulosis        PLAN     Lengthy discussion regarding her metoprolol including option of discontinuing metoprolol, continuing current dose of metoprolol versus lower metoprolol dose/addition of flecainide for PVC suppression given that she has symptomatic PVCs and an elevated burden placing her at risk for development of a cardiomyopathy. She wishes to discontinue metoprolol at this time and monitor clinically. Educated patient regarding signs and symptoms of heart failure. She will notify us if she observes these signs/symptoms. Recommend echocardiogram for surveillance yearly. Should she decide to choose medical therapy will consider adding Flecainide 50mg BID and decreasing metoprolol dose with repeat monitor to evaluate for bradycardia. We also discussed her stroke risk versus bleeding risk in the setting of AF and recent anemia requiring transfusions. Will reevaluate her candidacy for Watchman in May 2018 when her CHADSVASC score will increase to 1 (age 74). FOLLOW-UP     1 year     Thank you, Katharine Chong NP and Dr. Paulette Lea for allowing me to participate in the care of this female. Please do not hesitate to contact me for further questions/concerns.        LORIN Solano MD        [address] [place] [address] [address] [facility], [facility], [address] [place] [address],[address]  Rodolfo Villavicencio [place]  [phone] / [phone] Fax   [phone] / [phone] Fax

## 2017-11-03 NOTE — PROGRESS NOTES
Visit Vitals    /74 (BP 1 Location: Left arm, BP Patient Position: Sitting)    Pulse 60    Resp 16    Ht 5' 5\" (1.651 m)    Wt 156 lb 6.4 oz (70.9 kg)    SpO2 99%    BMI 26.03 kg/m2

## 2017-11-03 NOTE — MR AVS SNAPSHOT
Visit Information Date & Time Provider Department Dept. Phone Encounter #  
 11/3/2017  8:20 AM Bhakti Larsen MD [facility] Sandrine Ledezma [phone] [id number] Your Appointments 11/28/2017 11:00 AM  
HOLTER MONITOR with HOLTER, STFRANCIS  
[facility] (HANS HOBBS) Appt Note: 48 hr holter dr hillman. Rani Teran [address] [facility] [address]  
[phone]  
  
   
 [address] [facility] [address] [place] [address]  
  
    
 12/8/2017 10:40 AM  
ESTABLISHED PATIENT with Bhakti Larsen MD  
[facility] (Aryan Mirza) Appt Note: fu appt sll [address] [facility] [address] [place] [address] [id number] [place] [address] Upcoming Health Maintenance Date Due ZOSTER VACCINE AGE 60> 3/16/2003 GLAUCOMA SCREENING Q2Y 5/16/2008 OSTEOPOROSIS SCREENING (DEXA) 5/16/2008 Pneumococcal 65+ Low/Medium Risk (1 of 2 - PCV13) 5/16/2008 MEDICARE YEARLY EXAM 5/16/2008 INFLUENZA AGE 9 TO ADULT 8/1/2017 FOBT Q 1 YEAR AGE 50-75 8/23/2018 DTaP/Tdap/Td series (2 - Td) 6/7/2027 Allergies as of 11/3/2017  Review Complete On: 11/3/2017 By: Bhakti Larsen MD  
 No Known Allergies Current Immunizations  Never Reviewed Name Date Tdap 6/7/2017 12:05 AM  
  
 Not reviewed this visit You Were Diagnosed With   
  
 Codes Comments Paroxysmal a-fib (HCC)    -  Primary ICD-10-CM: I48.0 ICD-9-CM: 427.31 Vitals BP Pulse Resp Height(growth percentile) Weight(growth percentile) SpO2  
 122/74 (BP 1 Location: Left arm, BP Patient Position: Sitting) 60 16 5' 5\" (1.651 m) 156 lb 6.4 oz (70.9 kg) 99% BMI OB Status Smoking Status 26.03 kg/m2 Postmenopausal Current Every Day Smoker Vitals History BMI and BSA Data Body Mass Index Body Surface Area  26.03 kg/m 2 1.8 m 2  
  
 Preferred Pharmacy Pharmacy Name Phone [facility] [address]  [phone] Your Updated Medication List  
  
   
This list is accurate as of: 11/3/17  9:30 AM.  Always use your most recent med list.  
  
  
  
  
 levothyroxine 88 mcg tablet Commonly known as:  SYNTHROID Take 88 mcg by mouth Daily (before breakfast). metoprolol tartrate 25 mg tablet Commonly known as:  LOPRESSOR Take 1 Tab by mouth two (2) times a day. Introducing [facility] & HEALTH SERVICES! Candy Marquez introduces Issuu patient portal. Now you can access parts of your medical record, email your doctor's office, and request medication refills online. 1. In your internet browser, go to [email or web address] 2. Click on the First Time User? Click Here link in the Sign In box. You will see the New Member Sign Up page. 3. Enter your Issuu Access Code exactly as it appears below. You will not need to use this code after youve completed the sign-up process. If you do not sign up before the expiration date, you must request a new code. · Issuu Access Code: [email or web address] Expires: 1/15/2018  8:47 AM 
 
4. Enter the last four digits of your Social Security Number (xxxx) and Date of Birth (mm/dd/yyyy) as indicated and click Submit. You will be taken to the next sign-up page. 5. Create a Issuu ID. This will be your Issuu login ID and cannot be changed, so think of one that is secure and easy to remember. 6. Create a Issuu password. You can change your password at any time. 7. Enter your Password Reset Question and Answer. This can be used at a later time if you forget your password. 8. Enter your e-mail address. You will receive e-mail notification when new information is available in [address] 9. Click Sign Up. You can now view and download portions of your medical record. 10. Click the Download Summary menu link to download a portable copy of your medical information. If you have questions, please visit the Frequently Asked Questions section of the Neronote website. Remember, Neronote is NOT to be used for urgent needs. For medical emergencies, dial 911. Now available from your iPhone and Android! Please provide this summary of care documentation to your next provider. Your primary care clinician is listed as [facility]. If you have any questions after today's visit, please call [phone].

## 2018-03-26 ENCOUNTER — OFFICE VISIT (OUTPATIENT)
Dept: CARDIOLOGY CLINIC | Age: 73
End: 2018-03-26

## 2018-03-26 VITALS
WEIGHT: 154 LBS | DIASTOLIC BLOOD PRESSURE: 80 MMHG | HEART RATE: 67 BPM | OXYGEN SATURATION: 94 % | SYSTOLIC BLOOD PRESSURE: 130 MMHG | HEIGHT: 65 IN | BODY MASS INDEX: 25.66 KG/M2

## 2018-03-26 DIAGNOSIS — I48.0 PAROXYSMAL A-FIB (HCC): Primary | ICD-10-CM

## 2018-03-26 DIAGNOSIS — D64.9 SYMPTOMATIC ANEMIA: ICD-10-CM

## 2018-03-26 DIAGNOSIS — Z86.79 S/P ABLATION OF ATRIAL FIBRILLATION: ICD-10-CM

## 2018-03-26 DIAGNOSIS — Z98.890 S/P ABLATION OF ATRIAL FIBRILLATION: ICD-10-CM

## 2018-03-26 DIAGNOSIS — F17.210 CIGARETTE NICOTINE DEPENDENCE WITHOUT COMPLICATION: ICD-10-CM

## 2018-03-26 RX ORDER — ASPIRIN 81 MG/1
81 TABLET ORAL DAILY
Status: ON HOLD | COMMUNITY
End: 2019-07-16 | Stop reason: ALTCHOICE

## 2018-03-26 NOTE — PROGRESS NOTES
Laureen Hutchison, [address]  [address]  [place], [address] [place]    Office [phone]  Fax [phone]  Cell [phone]        Moris Rich is a 74 y.o. female last seen 6 months ago. Assessment  Encounter Diagnoses   Name Primary?  Paroxysmal a-fib (HCC) Yes    S/P ablation of atrial fibrillation     Cigarette nicotine dependence without complication     Symptomatic anemia        Recommendations:    Moris Rich has paroxsymal AF s/p AF ablation 2015 with symptomatic recurrence last year. Anticoagulation was suspended due to GI loss, uncertain etiology. She has no recurrent sxs and her pulse is regular today. She has no structural or ischemic heart disease other than moderate LAE by echo 2 years ago. We discussed the role of Watchman Device. She would be a good candidate. Will arrange for a follow up with Dr. Patrice Norton in 3 months. Chronic fatigue/anemia, noncardiac. She is a long term smoker. I do not have results of recent hemoglobin or TSH. Subjective:    Mrs. Rei Cuevas does notes longstanding persistent mild dizziness and lightheadedness which she has learned to deal with. She does has occasional palpitations. She is still being followed by Dr. Amadou Menendez, most recently HGB was normalized with iron infusions, no reports available. She denies any blood in her stool, exertional chest pain, palpitations, syncope, orthopnea, edema or paroxysmal nocturnal dyspnea. She is smoking 1/2 PPD. Cardiac risk factors   HTN no  DM no  Smoking yes- 1/2 pack per day.    Family hx of CAD yes    Cardiac testing  Lexiscan Cardiolite 5/22/17- normal perfusion, LVEF 76%  Echo 5/21/16 LVEF 60%, mild MR, moderate LAE    Past Medical History:   Diagnosis Date    Anemia     Atrial fibrillation (HCC)     s/p pulm vein isolation 2015    Atrial fibrillation with rapid ventricular response ([address] 5/20/2017    Depression     Hypothyroidism         Current Outpatient Prescriptions   Medication Sig Dispense Refill    aspirin delayed-release 81 mg tablet Take  by mouth daily.  levothyroxine (SYNTHROID) 88 mcg tablet Take 88 mcg by mouth Daily (before breakfast). No Known Allergies       Review of Systems  Constitutional: Negative for fever, chills and diaphoresis. Respiratory: Negative for cough, hemoptysis, sputum production and wheezing. Positive for MARY. Cardiovascular: Negative for chest pain,  orthopnea, claudication, leg swelling and PND. Gastrointestinal: Negative for heartburn, nausea, vomiting, blood in stool and melena. Genitourinary: Negative for dysuria and flank pain. Musculoskeletal: Negative for joint pain and back pain. Skin: Negative for rash. Neurological: Negative for focal weakness, seizures, loss of consciousness, weakness and headaches. Endo/Heme/Allergies: Does not bruise/bleed easily. Psychiatric/Behavioral: Negative for memory loss. The patient does not have insomnia. Physical Exam    Visit Vitals    /80 (BP 1 Location: Left arm, BP Patient Position: Sitting)    Pulse 67    Ht 5' 5\" (1.651 m)    Wt 154 lb (69.9 kg)    SpO2 94%    BMI 25.63 kg/m2     Wt Readings from Last 3 Encounters:   03/26/18 154 lb (69.9 kg)   11/03/17 156 lb 6.4 oz (70.9 kg)   10/11/17 154 lb (69.9 kg)      General - well developed well nourished but pale  Neck - JVP normal, thyroid nl  Cardiac - normal S1, S2, no murmurs, rubs or gallops.  No clicks  Vascular - carotids without bruits, radials, femorals and pedal pulses equal bilateral  Lungs - clear to auscultation bilaterals, no rales, wheezing or rhonchi  Abd - soft nontender, no HSM, no abd bruits  Extremities - no edema  Skin - no rash  Neuro - nonfocal  Psych - normal mood and affect    Cardiographics  Lexiscan Cardiolite 05/22/17- normal perfusion, LVEF 76%  Echo 05/21/17- LVEF 60%, moderate LAE mild MR   EKG 06/05/17- SR, 56 intemittent PVCs, otherwise normal   EKG 8/22/17- SR 69, ventricular bigeminy    Written by Leah Lassiter, as dictated by Mireya Epstein MD.   Mireya Epstein MD

## 2018-03-26 NOTE — MR AVS SNAPSHOT
[address] [facility] 600 [address] [facility] 
[phone] Patient: Mona Sol MRN: [id number] BOH:1/70/2001 Visit Information Date & Time Provider Department Dept. Phone Encounter #  
 3/26/2018 10:40 AM Sherren Cruise, MD [facility] Liz Solo [phone] [id number] Your Appointments 11/14/2018  2:00 PM  
ECHO CARDIOGRAMS 2D with DENISSE HERNÁNDEZ  
[facility] ([address] [place] [address]) Appt Note: fu appt sll; echo at 10am per dr hillman w/ ov at 11am; echo at 2pm fu appt  [facility] 3:20pm 2/20/18 lm for pt of appt time and day change from 11/14/18  [facility] [address] [facility] [address] [facility]  
[phone]  
  
   
 [address] [address]  
  
    
 11/14/2018  3:20 PM  
ESTABLISHED PATIENT with Tova Juarez MD  
[facility] ([address] [place] [address]) Appt Note: echo at 2pm fu appt  [facility] 3:20pm 2/20/18 lm for pt of appt time and day change from 11/14/18  [facility] [address] [facility] [address] [place] [address]  
[address] [facility] [address] [place] [address] Upcoming Health Maintenance Date Due ZOSTER VACCINE AGE 60> 3/16/2003 GLAUCOMA SCREENING Q2Y 5/16/2008 Bone Densitometry (Dexa) Screening 5/16/2008 Pneumococcal 65+ Low/Medium Risk (1 of 2 - PCV13) 5/16/2008 Influenza Age 5 to Adult 8/1/2017 MEDICARE YEARLY EXAM 3/14/2018 BREAST CANCER SCRN MAMMOGRAM 8/1/2018 FOBT Q 1 YEAR AGE 50-75 8/23/2018 DTaP/Tdap/Td series (2 - Td) 6/7/2027 Allergies as of 3/26/2018  Review Complete On: 3/26/2018 By: Kory Metz  
 No Known Allergies Current Immunizations  Never Reviewed Name Date Tdap 6/7/2017 12:05 AM  
  
 Not reviewed this visit You Were Diagnosed With   
  
 Codes Comments Paroxysmal a-fib (HCC)    -  Primary ICD-10-CM: I48.0 ICD-9-CM: 427.31 S/P ablation of atrial fibrillation     ICD-10-CM: Z98.890, Z86.79 
ICD-9-CM: V45.89 Vitals BP Pulse Height(growth percentile) Weight(growth percentile) SpO2 BMI  
 130/80 (BP 1 Location: Left arm, BP Patient Position: Sitting) 67 5' 5\" (1.651 m) 154 lb (69.9 kg) 94% 25.63 kg/m2 OB Status Smoking Status Postmenopausal Current Every Day Smoker Vitals History BMI and BSA Data Body Mass Index Body Surface Area  
 25.63 kg/m 2 1.79 m 2 Preferred Pharmacy Pharmacy Name Phone [facility] [address] AT  [address] [phone] Your Updated Medication List  
  
   
This list is accurate as of 3/26/18 11:05 AM.  Always use your most recent med list.  
  
  
  
  
 aspirin delayed-release 81 mg tablet Take  by mouth daily. levothyroxine 88 mcg tablet Commonly known as:  SYNTHROID Take 88 mcg by mouth Daily (before breakfast). Introducing [facility] & HEALTH SERVICES! [facility] introduces ALGAentis patient portal. Now you can access parts of your medical record, email your doctor's office, and request medication refills online. 1. In your internet browser, go to [email or web address] 2. Click on the First Time User? Click Here link in the Sign In box. You will see the New Member Sign Up page. 3. Enter your ALGAentis Access Code exactly as it appears below. You will not need to use this code after youve completed the sign-up process. If you do not sign up before the expiration date, you must request a new code. · AL[employer]is Access Code: [email or web address] Expires: 6/24/2018 11:05 AM 
 
4. Enter the last four digits of your Social Security Number (xxxx) and Date of Birth (mm/dd/yyyy) as indicated and click Submit. You will be taken to the next sign-up page. 5. Create a US HealthVest ID. This will be your US HealthVest login ID and cannot be changed, so think of one that is secure and easy to remember. 6. Create a US HealthVest password. You can change your password at any time. 7. Enter your Password Reset Question and Answer. This can be used at a later time if you forget your password. 8. Enter your e-mail address. You will receive e-mail notification when new information is available in [address] 9. Click Sign Up. You can now view and download portions of your medical record. 10. Click the Download Summary menu link to download a portable copy of your medical information. If you have questions, please visit the Frequently Asked Questions section of the US HealthVest website. Remember, US HealthVest is NOT to be used for urgent needs. For medical emergencies, dial 911. Now available from your iPhone and Android! Please provide this summary of care documentation to your next provider. Your primary care clinician is listed as Susann Bamberger. If you have any questions after today's visit, please call [phone].

## 2018-03-26 NOTE — PROGRESS NOTES
Visit Vitals    /80 (BP 1 Location: Left arm, BP Patient Position: Sitting)    Pulse 67    Ht 5' 5\" (1.651 m)    Wt 154 lb (69.9 kg)    SpO2 94%    BMI 25.63 kg/m2

## 2018-03-27 PROBLEM — R53.83 FATIGUE: Status: RESOLVED | Noted: 2017-08-23 | Resolved: 2018-03-27

## 2018-03-27 PROBLEM — R06.00 DYSPNEA: Status: RESOLVED | Noted: 2017-08-23 | Resolved: 2018-03-27

## 2018-03-27 PROBLEM — D62 ACUTE BLOOD LOSS ANEMIA: Status: RESOLVED | Noted: 2017-08-23 | Resolved: 2018-03-27

## 2018-06-27 ENCOUNTER — OFFICE VISIT (OUTPATIENT)
Dept: CARDIOLOGY CLINIC | Age: 73
End: 2018-06-27

## 2018-06-27 VITALS
SYSTOLIC BLOOD PRESSURE: 136 MMHG | WEIGHT: 153.2 LBS | DIASTOLIC BLOOD PRESSURE: 82 MMHG | HEIGHT: 65 IN | BODY MASS INDEX: 25.52 KG/M2 | OXYGEN SATURATION: 96 % | RESPIRATION RATE: 16 BRPM | HEART RATE: 60 BPM

## 2018-06-27 DIAGNOSIS — I48.0 PAROXYSMAL A-FIB (HCC): Primary | ICD-10-CM

## 2018-06-27 RX ORDER — GLUCOSAMINE SULFATE 1500 MG
1000 POWDER IN PACKET (EA) ORAL DAILY
COMMUNITY
End: 2018-09-24

## 2018-06-27 NOTE — PROGRESS NOTES
Visit Vitals    /82 (BP 1 Location: Left arm, BP Patient Position: Sitting)    Pulse 60    Resp 16    Ht 5' 5\" (1.651 m)    Wt 153 lb 3.2 oz (69.5 kg)    SpO2 96%    BMI 25.49 kg/m2     Medication changes made  per verbal order of Dr. Bo Laureano

## 2018-06-27 NOTE — PROGRESS NOTES
HISTORY OF PRESENTING ILLNESS      Valencia Coy is a 74 y.o. female with  paroxysmal atrial fibrillation status post pulmonary vein isolation in 1 in [place], hypothyroidism and severe anemia.  She was previously on rivaroxaban for CVA risk reduction of her atrial fibrillation however this was discontinued 1 month ago. Keyur Reynoso has undergone multiple iron transfusions for anemia in the past including 3 transfusions last month.  She continues to express palpitations with her atrial fibrillation on a daily basis.  She is currently on metoprolol and her resting heart rate is 50 bpm.  EKG last visit demonstrated sinus rhythm with a PVC. Nuclear stress test in 5/2017 demonstrated preserved LV function without ischemia.  She is felt to be at elevated risk for bleeding. Her HASBLED is 2. She underwent holter monitor which demonstrated 20% PVC burden and failed to reveal atrial fibrillation. Flecainide 50mg BID was ordered however she was hesitant to start this. She reports today that she has not experienced recurrent palpitations. Her CHADSVASC is now 3.         ACTIVE PROBLEM LIST     Patient Active Problem List    Diagnosis Date Noted    Symptomatic anemia 08/23/2017    Hypothyroid 08/23/2017    Cigarette nicotine dependence without complication 15/65/8324    Dental infection 05/20/2017    Calculus of gallbladder with chronic cholecystitis without obstruction 08/15/2016    S/P ablation of atrial fibrillation 08/15/2016    Hashimoto's thyroiditis 08/15/2016    Irritable bowel syndrome with diarrhea 08/15/2016    Paroxysmal A-fib ([address] 09/24/2015           PAST MEDICAL HISTORY     Past Medical History:   Diagnosis Date    Anemia     Atrial fibrillation ([address]     s/p pulm vein isolation 2015    Atrial fibrillation with rapid ventricular response ([address] 5/20/2017    Depression     Hypothyroidism            PAST SURGICAL HISTORY     Past Surgical History:   Procedure Laterality Date    CARDIAC SURG PROCEDURE UNLIST      cardioversionx4    COLONOSCOPY N/A 9/6/2017    COLONOSCOPY performed by Sylvia Montes [facility] at [facility] HX AFIB ABLATION  2016    HX CATARACT REMOVAL Left     HX CHOLECYSTECTOMY  08/26/2016    HX ORTHOPAEDIC Bilateral     arthroscopic knee surgery          ALLERGIES     No Known Allergies       FAMILY HISTORY     Family History   Problem Relation Age of Onset    Heart Disease Father     Cancer Paternal Aunt     negative for cardiac disease       SOCIAL HISTORY     Social History     Social History    Marital status: SINGLE     Spouse name: N/A    Number of children: N/A    Years of education: N/A     Social History Main Topics    Smoking status: Current Every Day Smoker     Packs/day: 1.00     Years: 50.00    Smokeless tobacco: Never Used      Comment: about . 75 packs per day    Alcohol use Yes      Comment: rare    Drug use: No    Sexual activity: Not Currently     Partners: Male     Other Topics Concern    Not on file     Social History Narrative         MEDICATIONS     Current Outpatient Prescriptions   Medication Sig    aspirin delayed-release 81 mg tablet Take  by mouth daily.  levothyroxine (SYNTHROID) 88 mcg tablet Take 88 mcg by mouth Daily (before breakfast). No current facility-administered medications for this visit. I have reviewed the nurses notes, vitals, problem list, allergy list, medical history, family, social history and medications. REVIEW OF SYMPTOMS      General: Pt denies excessive weight gain or loss. Pt is able to conduct ADL's  HEENT: Denies blurred vision, headaches, hearing loss, epistaxis and difficulty swallowing. Respiratory: Denies cough, congestion, shortness of breath, MARY, wheezing or stridor.   Cardiovascular: Denies precordial pain, palpitations, edema or PND  Gastrointestinal: Denies poor appetite, indigestion, abdominal pain or blood in stool  Genitourinary: Denies hematuria, dysuria, increased urinary frequency  Musculoskeletal: Denies joint pain or swelling from muscles or joints  Neurologic: Denies tremor, paresthesias, headache, or sensory motor disturbance  Psychiatric: Denies confusion, insomnia, depression  Integumentray: Denies rash, itching or ulcers. Hematologic: Denies easy bruising, bleeding       PHYSICAL EXAMINATION      There were no vitals filed for this visit. General: Well developed, in no acute distress. HEENT: No jaundice, oral mucosa moist, no oral ulcers  Neck: Supple, no stiffness, no lymphadenopathy, supple  Heart:  Normal S1/S2 negative S3 or S4. Regular, no murmur, gallop or rub, no jugular venous distention  Respiratory: Clear bilaterally x 4, no wheezing or rales  Abdomen:   Soft, non-tender, bowel sounds are active.   Extremities:  No edema, normal cap refill, no cyanosis. Musculoskeletal: No clubbing, no deformities  Neuro: A&Ox3, speech clear, gait stable, cooperative, no focal neurologic deficits  Skin: Skin color is normal. No rashes or lesions. Non diaphoretic, moist.  Vascular: 2+ pulses symmetric in all extremities       DIAGNOSTIC DATA      EKG:        LABORATORY DATA      Lab Results   Component Value Date/Time    WBC 6.7 08/24/2017 01:50 AM    HGB 7.0 (L) 08/24/2017 01:50 AM    HCT 23.4 (L) 08/24/2017 01:50 AM    PLATELET 154 91/30/5186 01:50 AM    MCV 87.3 08/24/2017 01:50 AM      Lab Results   Component Value Date/Time    Sodium 141 08/24/2017 01:50 AM    Potassium 4.4 08/24/2017 01:50 AM    Chloride 110 (H) 08/24/2017 01:50 AM    CO2 25 08/24/2017 01:50 AM    Anion gap 6 08/24/2017 01:50 AM    Glucose 94 08/24/2017 01:50 AM    BUN 22 (H) 08/24/2017 01:50 AM    Creatinine 0.93 08/24/2017 01:50 AM    BUN/Creatinine ratio 24 (H) 08/24/2017 01:50 AM    GFR est AA >60 08/24/2017 01:50 AM    GFR est non-AA 59 (L) 08/24/2017 01:50 AM    Calcium 8.6 08/24/2017 01:50 AM    Bilirubin, total 0.3 08/24/2017 01:50 AM    AST (SGOT) 10 (L) 08/24/2017 01:50 AM    Alk.  phosphatase 58 08/24/2017 01:50 AM    Protein, total 6.0 (L) 08/24/2017 01:50 AM    Albumin 2.9 (L) 08/24/2017 01:50 AM    Globulin 3.1 08/24/2017 01:50 AM    A-G Ratio 0.9 (L) 08/24/2017 01:50 AM    ALT (SGPT) 13 08/24/2017 01:50 AM           ASSESSMENT      1. Atrial fibrillation                                         D. Paroxysmal                        R. PVI 2015                        C. HASBLED 2  2. Severe anemia  3. Hypothyroidism  4. Premature ventricular contractions                        A.  Symptomatic  5. Diverticulosis       PLAN     Recommend considering WATCHMAN given intolerance to anticoagulation in the setting of anemia/hx multiple transfusions in the past. She will consider and notify us of how she wishes to proceed. Thank you, Fab Prabhakar NP and Dr. Yaya Larry for allowing me to participate in the care of this extraordinarily pleasant female. Please do not hesitate to contact me for further questions/concerns.          Srini Ceballos MD  Cardiac Electrophysiology / Cardiology    [address]  [address] [place] [address], [facility], [address]  [phone] / [phone] Fax   [phone] / [phone] Fax

## 2018-07-06 ENCOUNTER — TELEPHONE (OUTPATIENT)
Dept: CARDIOLOGY CLINIC | Age: 73
End: 2018-07-06

## 2018-07-09 NOTE — TELEPHONE ENCOUNTER
Will schedule for August 16th or July 26th pending approval from [facility] cath lab.

## 2018-07-10 NOTE — TELEPHONE ENCOUNTER
Returned call. Patient states Dr. Isa Ly told her she would have to be on anticoagulant 45 days prior to [facility] procedure. Informed patient standard procedure is anticoagulation 45 days post procedure. Will clarify with Dr. Isa Ly instructions for patient. Patient wishes to hold off procedure until September. Will clarify instructions and contact patient.

## 2018-07-11 NOTE — TELEPHONE ENCOUNTER
Called patient. Left voicemail message. Confirmed with Dr. Radha Delgado anticoagulation is for post procedure. Patient also discussed a September procedure date. Will return call to discuss.

## 2018-07-12 NOTE — TELEPHONE ENCOUNTER
Spoke with patient. Reviewed anticoagulation post Watchman procedure. Understanding expressed. Wishes to wait until September for procedure. Will cancel Watchman for August 16th and reschedule with September availability at [facility].

## 2018-08-31 ENCOUNTER — TELEPHONE (OUTPATIENT)
Dept: CARDIOLOGY CLINIC | Age: 73
End: 2018-08-31

## 2018-08-31 NOTE — TELEPHONE ENCOUNTER
Patient is calling regarding the procedure she was to get scheduled for that has not yet been scheduled  Phone: [phone]

## 2018-09-06 DIAGNOSIS — I48.0 PAROXYSMAL A-FIB (HCC): Primary | ICD-10-CM

## 2018-09-06 NOTE — TELEPHONE ENCOUNTER
Spoke with patient. Ordered CTA preop for Watchman.   Patient requests Watchman arrival time to be after 9:00am.

## 2018-09-14 ENCOUNTER — TELEPHONE (OUTPATIENT)
Dept: CARDIOLOGY CLINIC | Age: 73
End: 2018-09-14

## 2018-09-19 ENCOUNTER — TELEPHONE (OUTPATIENT)
Dept: CARDIOLOGY CLINIC | Age: 73
End: 2018-09-19

## 2018-09-19 NOTE — TELEPHONE ENCOUNTER
Called patient. Left voicemail message. Will not know arrival time of procedure until Monday, 9/24. Will notify patient of arrival time on/after Monday and if she can drive herself to the procedure. Patient will need a  to take her home.

## 2018-09-19 NOTE — TELEPHONE ENCOUNTER
Pt called in regards to her procedure. She is wanting to know the time as well as transportation before or after the surgery.  She wanted to know if she can take herself   Phone# [phone]

## 2018-09-21 NOTE — TELEPHONE ENCOUNTER
Pt called again in regards to previous conversation she had with nurse. Pt stated that she has no transportation for her this coming Monday for procedure and wasn't sure what to do. Pt stated she would like for nurse to return call back.    Phone# [phone]

## 2018-09-24 ENCOUNTER — HOSPITAL ENCOUNTER (OUTPATIENT)
Dept: PREADMISSION TESTING | Age: 73
Discharge: HOME | End: 2018-09-24
Attending: INTERNAL MEDICINE
Payer: MEDICARE

## 2018-09-24 ENCOUNTER — HOSPITAL ENCOUNTER (OUTPATIENT)
Dept: GENERAL RADIOLOGY | Age: 73
Discharge: HOME | End: 2018-09-24
Attending: INTERNAL MEDICINE
Payer: MEDICARE

## 2018-09-24 VITALS
RESPIRATION RATE: 18 BRPM | OXYGEN SATURATION: 98 % | HEART RATE: 70 BPM | TEMPERATURE: 98 F | DIASTOLIC BLOOD PRESSURE: 73 MMHG | BODY MASS INDEX: 26.3 KG/M2 | SYSTOLIC BLOOD PRESSURE: 138 MMHG | HEIGHT: 65 IN | WEIGHT: 157.85 LBS

## 2018-09-24 LAB
ANION GAP SERPL CALC-SCNC: 5 MMOL/L (ref 5–15)
APPEARANCE UR: CLEAR
ATRIAL RATE: 63 BPM
BACTERIA URNS QL MICRO: ABNORMAL /HPF
BILIRUB UR QL: NEGATIVE
BUN SERPL-MCNC: 23 MG/DL (ref 6–20)
BUN/CREAT SERPL: 29 (ref 12–20)
CALCIUM SERPL-MCNC: 9 MG/DL (ref 8.5–10.1)
CALCULATED P AXIS, ECG09: 43 DEGREES
CALCULATED R AXIS, ECG10: 12 DEGREES
CALCULATED T AXIS, ECG11: 48 DEGREES
CHLORIDE SERPL-SCNC: 106 MMOL/L (ref 97–108)
CO2 SERPL-SCNC: 28 MMOL/L (ref 21–32)
COLOR UR: ABNORMAL
CREAT SERPL-MCNC: 0.79 MG/DL (ref 0.55–1.02)
DIAGNOSIS, 93000: NORMAL
EPITH CASTS URNS QL MICRO: ABNORMAL /LPF
ERYTHROCYTE [DISTWIDTH] IN BLOOD BY AUTOMATED COUNT: 15.5 % (ref 11.5–14.5)
GLUCOSE SERPL-MCNC: 92 MG/DL (ref 65–100)
GLUCOSE UR STRIP.AUTO-MCNC: NEGATIVE MG/DL
HCT VFR BLD AUTO: 39.8 % (ref 35–47)
HGB BLD-MCNC: 13.2 G/DL (ref 11.5–16)
HGB UR QL STRIP: ABNORMAL
HYALINE CASTS URNS QL MICRO: ABNORMAL /LPF (ref 0–5)
KETONES UR QL STRIP.AUTO: NEGATIVE MG/DL
LEUKOCYTE ESTERASE UR QL STRIP.AUTO: ABNORMAL
MCH RBC QN AUTO: 31.1 PG (ref 26–34)
MCHC RBC AUTO-ENTMCNC: 33.2 G/DL (ref 30–36.5)
MCV RBC AUTO: 93.9 FL (ref 80–99)
NITRITE UR QL STRIP.AUTO: NEGATIVE
NRBC # BLD: 0 K/UL (ref 0–0.01)
NRBC BLD-RTO: 0 PER 100 WBC
P-R INTERVAL, ECG05: 120 MS
PH UR STRIP: 5 [PH] (ref 5–8)
PLATELET # BLD AUTO: 261 K/UL (ref 150–400)
PMV BLD AUTO: 10.8 FL (ref 8.9–12.9)
POTASSIUM SERPL-SCNC: 4.3 MMOL/L (ref 3.5–5.1)
PROT UR STRIP-MCNC: NEGATIVE MG/DL
Q-T INTERVAL, ECG07: 402 MS
QRS DURATION, ECG06: 78 MS
QTC CALCULATION (BEZET), ECG08: 411 MS
RBC # BLD AUTO: 4.24 M/UL (ref 3.8–5.2)
RBC #/AREA URNS HPF: ABNORMAL /HPF (ref 0–5)
SODIUM SERPL-SCNC: 139 MMOL/L (ref 136–145)
SP GR UR REFRACTOMETRY: 1.02 (ref 1–1.03)
UA: UC IF INDICATED,UAUC: ABNORMAL
UROBILINOGEN UR QL STRIP.AUTO: 0.2 EU/DL (ref 0.2–1)
VENTRICULAR RATE, ECG03: 63 BPM
WBC # BLD AUTO: 6.6 K/UL (ref 3.6–11)
WBC URNS QL MICRO: ABNORMAL /HPF (ref 0–4)

## 2018-09-24 PROCEDURE — 36415 COLL VENOUS BLD VENIPUNCTURE: CPT | Performed by: INTERNAL MEDICINE

## 2018-09-24 PROCEDURE — 80048 BASIC METABOLIC PNL TOTAL CA: CPT | Performed by: INTERNAL MEDICINE

## 2018-09-24 PROCEDURE — 71046 X-RAY EXAM CHEST 2 VIEWS: CPT

## 2018-09-24 PROCEDURE — 93005 ELECTROCARDIOGRAM TRACING: CPT

## 2018-09-24 PROCEDURE — 81001 URINALYSIS AUTO W/SCOPE: CPT | Performed by: INTERNAL MEDICINE

## 2018-09-24 PROCEDURE — 85027 COMPLETE CBC AUTOMATED: CPT | Performed by: INTERNAL MEDICINE

## 2018-09-24 PROCEDURE — 87086 URINE CULTURE/COLONY COUNT: CPT | Performed by: INTERNAL MEDICINE

## 2018-09-24 NOTE — TELEPHONE ENCOUNTER
Returned call. Per patient, she is having difficulty finding transportation home after the Watchman procedure on 9/28. Inquired if she will be able to take a cab home. I told her it should be okay, the main concern is that she does not drive herself. I will also verify this with Dr. Km Tapia and hospital personnel. Understanding expressed.

## 2018-09-25 ENCOUNTER — PATIENT OUTREACH (OUTPATIENT)
Dept: CARDIOLOGY CLINIC | Age: 73
End: 2018-09-25

## 2018-09-25 ENCOUNTER — HOSPITAL ENCOUNTER (OUTPATIENT)
Dept: CT IMAGING | Age: 73
Discharge: HOME | End: 2018-09-25
Attending: INTERNAL MEDICINE
Payer: MEDICARE

## 2018-09-25 DIAGNOSIS — I48.0 PAROXYSMAL A-FIB (HCC): ICD-10-CM

## 2018-09-25 LAB
BACTERIA SPEC CULT: ABNORMAL
BACTERIA SPEC CULT: ABNORMAL
SERVICE CMNT-IMP: ABNORMAL

## 2018-09-25 PROCEDURE — 71275 CT ANGIOGRAPHY CHEST: CPT

## 2018-09-25 PROCEDURE — 74011636320 HC RX REV CODE- 636/320: Performed by: INTERNAL MEDICINE

## 2018-09-25 PROCEDURE — 74011250637 HC RX REV CODE- 250/637: Performed by: RADIOLOGY

## 2018-09-25 RX ORDER — ONDANSETRON 4 MG/1
4 TABLET, ORALLY DISINTEGRATING ORAL
Status: DISCONTINUED | OUTPATIENT
Start: 2018-09-25 | End: 2018-09-25 | Stop reason: ALTCHOICE

## 2018-09-25 RX ADMIN — ONDANSETRON 4 MG: 4 TABLET, ORALLY DISINTEGRATING ORAL at 08:31

## 2018-09-25 RX ADMIN — IOPAMIDOL 100 ML: 755 INJECTION, SOLUTION INTRAVENOUS at 09:19

## 2018-09-25 NOTE — PROGRESS NOTES
Pt encountered in Patient access area prior to CT scan with contrast.  Pt is alert and oriented x 3, denies complaint at present. Pt decsribed that last time she had a scan with contrast, 2 or 3 years ago,  she became dizzy and nauseous and did have a small emesis. Discussed with CT and Dr Felicia Johnston and pt medicated with Zofran 4 mg SL tablet. 200 Checked on patient who is sitting in CT patient dressing room and who denies complaint at present. Pt instructed to pull call cord should she have any difficulty. CT tech [facility] made aware of patient's location and time of Zofran administration.

## 2018-09-26 ENCOUNTER — TELEPHONE (OUTPATIENT)
Dept: CARDIOLOGY CLINIC | Age: 73
End: 2018-09-26

## 2018-09-26 LAB
BACTERIA SPEC CULT: NORMAL
CC UR VC: NORMAL
SERVICE CMNT-IMP: NORMAL

## 2018-09-26 RX ORDER — MUPIROCIN 20 MG/G
OINTMENT TOPICAL 2 TIMES DAILY
Qty: 22 G | Refills: 0 | Status: SHIPPED | OUTPATIENT
Start: 2018-09-26 | End: 2018-10-01

## 2018-09-26 RX ORDER — MUPIROCIN 20 MG/G
OINTMENT TOPICAL 2 TIMES DAILY
COMMUNITY
Start: 2018-09-26 | End: 2018-10-01

## 2018-09-26 NOTE — TELEPHONE ENCOUNTER
Spoke with Ms. Puri to inform her of cancelled Watchman procedure for tomorrow. Will coordinate re-scheduling.

## 2018-10-12 ENCOUNTER — OFFICE VISIT (OUTPATIENT)
Dept: CARDIOLOGY CLINIC | Age: 73
End: 2018-10-12

## 2018-10-12 ENCOUNTER — HOSPITAL ENCOUNTER (OUTPATIENT)
Dept: LAB | Age: 73
Discharge: HOME | End: 2018-10-12
Payer: MEDICARE

## 2018-10-12 VITALS
BODY MASS INDEX: 26.16 KG/M2 | SYSTOLIC BLOOD PRESSURE: 136 MMHG | HEART RATE: 60 BPM | RESPIRATION RATE: 16 BRPM | OXYGEN SATURATION: 99 % | WEIGHT: 157 LBS | HEIGHT: 65 IN | DIASTOLIC BLOOD PRESSURE: 88 MMHG

## 2018-10-12 DIAGNOSIS — E03.9 HYPOTHYROIDISM, UNSPECIFIED TYPE: Chronic | ICD-10-CM

## 2018-10-12 DIAGNOSIS — Z86.79 S/P ABLATION OF ATRIAL FIBRILLATION: ICD-10-CM

## 2018-10-12 DIAGNOSIS — D64.9 SYMPTOMATIC ANEMIA: ICD-10-CM

## 2018-10-12 DIAGNOSIS — I48.0 PAROXYSMAL A-FIB (HCC): Primary | ICD-10-CM

## 2018-10-12 DIAGNOSIS — Z98.890 S/P ABLATION OF ATRIAL FIBRILLATION: ICD-10-CM

## 2018-10-12 PROCEDURE — 85027 COMPLETE CBC AUTOMATED: CPT

## 2018-10-12 PROCEDURE — 84443 ASSAY THYROID STIM HORMONE: CPT

## 2018-10-12 PROCEDURE — 80053 COMPREHEN METABOLIC PANEL: CPT

## 2018-10-12 PROCEDURE — 36415 COLL VENOUS BLD VENIPUNCTURE: CPT

## 2018-10-12 RX ORDER — METOPROLOL SUCCINATE 25 MG/1
12.5 TABLET, EXTENDED RELEASE ORAL DAILY
Qty: 30 TAB | Refills: 1 | Status: SHIPPED | OUTPATIENT
Start: 2018-10-12 | End: 2018-11-14 | Stop reason: ALTCHOICE

## 2018-10-12 NOTE — PROGRESS NOTES
Visit Vitals    /88 (BP 1 Location: Left arm, BP Patient Position: Sitting)    Pulse 60    Resp 16    Ht 5' 4.5\" (1.638 m)    Wt 157 lb (71.2 kg)    SpO2 99%    BMI 26.53 kg/m2     Patient complaints of: SOB, PALPITATIONS, lightheadedness and feels shakey

## 2018-10-12 NOTE — PROGRESS NOTES
HISTORY OF PRESENTING ILLNESS      Annie Mi is a 74 y.o. female paroxysmal atrial fibrillation status post pulmonary vein isolation in 1 in [place], hypothyroidism and severe anemia.  She was previously on rivaroxaban for CVA risk reduction of her atrial fibrillation however this was discontinued 1 month ago. Surinder Espinoza has undergone multiple iron transfusions for anemia in the past including 3 transfusions last month. Nuclear stress test in 5/2017 demonstrated preserved LV function without ischemia.  She is felt to be at elevated risk for bleeding. Her HASBLED is 2 with a CHADSVASC of 3. She underwent holter monitor which demonstrated 20% PVC burden and failed to reveal atrial fibrillation. She was offered Flecainide 50mg BID however she was hesitant to start this. She was planned for Watchman; however, this was postponed. She stopped metoprolol approximately one year ago as she was not having symptoms at that time. Previous visits, she's reports cessation in palpitations and was feeling well overall. For the past 3-4 days, she has had return of palpitations, shortness of breath and an overall weak/\"shakey\" feeling. She has felt similar symptoms with previous anemia and also has a history of hypothyroidism.        ACTIVE PROBLEM LIST     Patient Active Problem List    Diagnosis Date Noted    Symptomatic anemia 08/23/2017    Hypothyroid 08/23/2017    Cigarette nicotine dependence without complication 65/21/9720    Dental infection 05/20/2017    Calculus of gallbladder with chronic cholecystitis without obstruction 08/15/2016    S/P ablation of atrial fibrillation 08/15/2016    Hashimoto's thyroiditis 08/15/2016    Irritable bowel syndrome with diarrhea 08/15/2016    Paroxysmal A-fib ([address] 09/24/2015           PAST MEDICAL HISTORY     Past Medical History:   Diagnosis Date    Anemia     Atrial fibrillation (HCC)     s/p pulm vein isolation 2015    [facility] 5/20/2017    Depression     GERD (gastroesophageal reflux disease)     Hypothyroidism     Ill-defined condition     bleeding in liver while on Xarelto           PAST SURGICAL HISTORY     Past Surgical History:   Procedure Laterality Date    CARDIAC SURG PROCEDURE UNLIST      cardioversionx4    COLONOSCOPY N/A 9/6/2017    COLONOSCOPY performed by Tin Cox MD at [address] HX AFIB ABLATION  2016    HX CATARACT REMOVAL Bilateral     HX CHOLECYSTECTOMY  08/26/2016    HX ORTHOPAEDIC Bilateral     arthroscopic knee surgery          ALLERGIES     No Known Allergies       FAMILY HISTORY     Family History   Problem Relation Age of Onset    Heart Disease Father     Cancer Paternal Aunt     negative for cardiac disease       SOCIAL HISTORY     Social History     Social History    Marital status: SINGLE     Spouse name: N/A    Number of children: N/A    Years of education: N/A     Social History Main Topics    Smoking status: Current Every Day Smoker     Packs/day: 0.50     Years: 50.00    Smokeless tobacco: Never Used    Alcohol use No    Drug use: No    Sexual activity: Not Currently     Partners: Male     Other Topics Concern    None     Social History Narrative         MEDICATIONS     Current Outpatient Prescriptions   Medication Sig    metoprolol succinate (TOPROL-XL) 25 mg XL tablet Take 0.5 Tabs by mouth daily.  aspirin delayed-release 81 mg tablet Take 81 mg by mouth daily.  levothyroxine (SYNTHROID) 88 mcg tablet Take 88 mcg by mouth Daily (before breakfast). No current facility-administered medications for this visit. I have reviewed the nurses notes, vitals, problem list, allergy list, medical history, family, social history and medications. REVIEW OF SYMPTOMS      General: +shakiness/weakness, Pt denies excessive weight gain or loss.  Pt is able to conduct ADL's  HEENT: Denies blurred vision, headaches, hearing loss, epistaxis and difficulty swallowing. Respiratory: +sob, Denies cough, congestion,  wheezing or stridor. Cardiovascular: +palpitations, Denies precordial pain, edema or PND  Gastrointestinal: Denies poor appetite, indigestion, abdominal pain or blood in stool  Genitourinary: Denies hematuria, dysuria, increased urinary frequency  Musculoskeletal: Denies joint pain or swelling from muscles or joints  Neurologic: Denies tremor, paresthesias, headache, or sensory motor disturbance  Psychiatric: Denies confusion, insomnia, depression  Integumentray: Denies rash, itching or ulcers. Hematologic: Denies easy bruising, bleeding       PHYSICAL EXAMINATION      Vitals:    10/12/18 1020   BP: 136/88   Pulse: 60   Resp: 16   SpO2: 99%   Weight: 157 lb (71.2 kg)   Height: 5' 4.5\" (1.638 m)     General: Well developed, in no acute distress. HEENT: No jaundice, oral mucosa moist, no oral ulcers  Neck: Supple, no stiffness, no lymphadenopathy, supple  Heart:  Normal S1/S2 negative S3 or S4. Regular, no murmur, gallop or rub, no jugular venous distention  Respiratory: Clear bilaterally x 4, no wheezing or rales  Abdomen:   Soft, non-tender, bowel sounds are active.   Extremities:  No edema, normal cap refill, no cyanosis. Musculoskeletal: No clubbing, no deformities  Neuro: A&Ox3, speech clear, gait stable, cooperative, no focal neurologic deficits  Skin: Skin color is normal. No rashes or lesions.  Non diaphoretic, moist.  Vascular: 2+ pulses symmetric in all extremities       DIAGNOSTIC DATA      EKG: sinus rhythm with PACs       LABORATORY DATA      Lab Results   Component Value Date/Time    WBC 6.6 09/24/2018 02:14 PM    HGB 13.2 09/24/2018 02:14 PM    HCT 39.8 09/24/2018 02:14 PM    PLATELET 550 41/69/9054 02:14 PM    MCV 93.9 09/24/2018 02:14 PM      Lab Results   Component Value Date/Time    Sodium 139 09/24/2018 02:14 PM    Potassium 4.3 09/24/2018 02:14 PM    Chloride 106 09/24/2018 02:14 PM    CO2 28 09/24/2018 02:14 PM    Anion gap 5 09/24/2018 02:14 PM    Glucose 92 09/24/2018 02:14 PM    BUN 23 (H) 09/24/2018 02:14 PM    Creatinine 0.79 09/24/2018 02:14 PM    BUN/Creatinine ratio 29 (H) 09/24/2018 02:14 PM    GFR est AA >60 09/24/2018 02:14 PM    GFR est non-AA >60 09/24/2018 02:14 PM    Calcium 9.0 09/24/2018 02:14 PM    Bilirubin, total 0.3 08/24/2017 01:50 AM    AST (SGOT) 10 (L) 08/24/2017 01:50 AM    Alk. phosphatase 58 08/24/2017 01:50 AM    Protein, total 6.0 (L) 08/24/2017 01:50 AM    Albumin 2.9 (L) 08/24/2017 01:50 AM    Globulin 3.1 08/24/2017 01:50 AM    A-G Ratio 0.9 (L) 08/24/2017 01:50 AM    ALT (SGPT) 13 08/24/2017 01:50 AM           ASSESSMENT      1. Atrial fibrillation                                         H. Paroxysmal                        N. PVI 2015                        C. HASBLED 2  2. Severe anemia  3. Hypothyroidism  4. Premature ventricular contractions                        A.  Symptomatic  5. Diverticulosis       PLAN     Will obtain labwork CMP, CBC, TSH and re-start metoprolol 12.5mg daily. Follow up with Dr. Eh Gonzáles for further titration of BB therapy, consider holter monitor for extend rhythm evaluation should symptoms persist. Plan to re-schedule watchman procedure. FOLLOW-UP   3-4 weeks    Thank you, Kathia Cook NP and Dr. Stringer [facility] for allowing me to participate in the care of this extraordinarily pleasant female. Please do not hesitate to contact me for further questions/concerns.        Boaz Pickrell, NP    [address]  [address], [address] [facility], [address]  [place], [address]  [phone] / [phone] Fax   [phone] / [phone] Fax

## 2018-10-14 LAB
ALBUMIN SERPL-MCNC: 4.2 G/DL (ref 3.5–4.8)
ALBUMIN/GLOB SERPL: 1.4 {RATIO} (ref 1.2–2.2)
ALP SERPL-CCNC: 88 IU/L (ref 39–117)
ALT SERPL-CCNC: 7 IU/L (ref 0–32)
AST SERPL-CCNC: 17 IU/L (ref 0–40)
BILIRUB SERPL-MCNC: 0.3 MG/DL (ref 0–1.2)
BUN SERPL-MCNC: 18 MG/DL (ref 8–27)
BUN/CREAT SERPL: 20 (ref 12–28)
CALCIUM SERPL-MCNC: 9.8 MG/DL (ref 8.7–10.3)
CHLORIDE SERPL-SCNC: 103 MMOL/L (ref 96–106)
CO2 SERPL-SCNC: 24 MMOL/L (ref 20–29)
CREAT SERPL-MCNC: 0.91 MG/DL (ref 0.57–1)
ERYTHROCYTE [DISTWIDTH] IN BLOOD BY AUTOMATED COUNT: 16.4 % (ref 12.3–15.4)
FT4I SERPL CALC-MCNC: 3.2 (ref 1.2–4.9)
GLOBULIN SER CALC-MCNC: 2.9 G/DL (ref 1.5–4.5)
GLUCOSE SERPL-MCNC: 91 MG/DL (ref 65–99)
HCT VFR BLD AUTO: 40.6 % (ref 34–46.6)
HGB BLD-MCNC: 13.6 G/DL (ref 11.1–15.9)
MCH RBC QN AUTO: 30.9 PG (ref 26.6–33)
MCHC RBC AUTO-ENTMCNC: 33.5 G/DL (ref 31.5–35.7)
MCV RBC AUTO: 92 FL (ref 79–97)
PLATELET # BLD AUTO: 275 X10E3/UL (ref 150–379)
POTASSIUM SERPL-SCNC: 4.7 MMOL/L (ref 3.5–5.2)
PROT SERPL-MCNC: 7.1 G/DL (ref 6–8.5)
RBC # BLD AUTO: 4.4 X10E6/UL (ref 3.77–5.28)
SODIUM SERPL-SCNC: 142 MMOL/L (ref 134–144)
T3RU NFR SERPL: 30 % (ref 24–39)
T4 SERPL-MCNC: 10.6 UG/DL (ref 4.5–12)
TSH SERPL DL<=0.005 MIU/L-ACNC: 2.12 UIU/ML (ref 0.45–4.5)
WBC # BLD AUTO: 6.6 X10E3/UL (ref 3.4–10.8)

## 2018-11-13 NOTE — PROGRESS NOTES
Room 6    Visit Vitals  /82 (BP 1 Location: Left arm, BP Patient Position: Sitting)   Pulse (!) 59   Resp 16   Ht 5' 4.5\" (1.638 m)   Wt 156 lb 6.4 oz (70.9 kg)   SpO2 97%   BMI 26.43 kg/m²     C/o randome dizziness, fatigue, palpitations

## 2018-11-14 ENCOUNTER — CLINICAL SUPPORT (OUTPATIENT)
Dept: CARDIOLOGY CLINIC | Age: 73
End: 2018-11-14

## 2018-11-14 ENCOUNTER — OFFICE VISIT (OUTPATIENT)
Dept: CARDIOLOGY CLINIC | Age: 73
End: 2018-11-14

## 2018-11-14 VITALS
SYSTOLIC BLOOD PRESSURE: 142 MMHG | OXYGEN SATURATION: 97 % | RESPIRATION RATE: 16 BRPM | WEIGHT: 156.4 LBS | BODY MASS INDEX: 26.06 KG/M2 | HEIGHT: 65 IN | DIASTOLIC BLOOD PRESSURE: 82 MMHG | HEART RATE: 59 BPM

## 2018-11-14 DIAGNOSIS — I48.0 PAROXYSMAL A-FIB (HCC): ICD-10-CM

## 2018-11-14 DIAGNOSIS — I48.0 PAROXYSMAL A-FIB (HCC): Primary | ICD-10-CM

## 2018-11-14 NOTE — PROGRESS NOTES
HISTORY OF PRESENTING ILLNESS      Binh Wilson is a 74 y.o. female with paroxysmal atrial fibrillation status post pulmonary vein isolation in 1 in [place], hypothyroidism and severe anemia.  She was previously on rivaroxaban for CVA risk reduction of her atrial fibrillation however this was discontinued 1 month ago. Dash Encarnacion has undergone multiple iron transfusions for anemia in the past including 3 transfusions last month. Nuclear stress test in 5/2017 demonstrated preserved LV function without ischemia.  She is felt to be at elevated risk for bleeding. Her HASBLED is 2 with a CHADSVASC of 3. She underwent holter monitor which demonstrated 20% PVC burden and failed to reveal atrial fibrillation. She was offered Flecainide 50mg BID however she was hesitant to start this. She was planned for Watchman; however, this was postponed. She presented with dizziness and fatigue. We arrange for blood work which was unrevealing. She was recently seen by hematologist who felt that her anemia was not driving her symptoms. We started a trial of low-dose Toprol which has been ineffective in improving her symptoms. EKG today shows sinus rhythm. She has constant fatigue and dizziness.        ACTIVE PROBLEM LIST     Patient Active Problem List    Diagnosis Date Noted    Symptomatic anemia 08/23/2017    Hypothyroid 08/23/2017    Cigarette nicotine dependence without complication 40/82/9463    Dental infection 05/20/2017    Calculus of gallbladder with chronic cholecystitis without obstruction 08/15/2016    S/P ablation of atrial fibrillation 08/15/2016    Hashimoto's thyroiditis 08/15/2016    Irritable bowel syndrome with diarrhea 08/15/2016    Paroxysmal A-fib ([address] 09/24/2015           PAST MEDICAL HISTORY     Past Medical History:   Diagnosis Date    Anemia     Atrial fibrillation (HCC)     s/p pulm vein isolation 2015    Atrial fibrillation with rapid ventricular response ([address] 5/20/2017    Depression     GERD (gastroesophageal reflux disease)     Hypothyroidism     Ill-defined condition     bleeding in liver while on Xarelto           PAST SURGICAL HISTORY     Past Surgical History:   Procedure Laterality Date    CARDIAC SURG PROCEDURE UNLIST      cardioversionx4    HX AFIB ABLATION  2016    HX CATARACT REMOVAL Bilateral     HX CHOLECYSTECTOMY  08/26/2016    HX ORTHOPAEDIC Bilateral     arthroscopic knee surgery          ALLERGIES     No Known Allergies       FAMILY HISTORY     Family History   Problem Relation Age of Onset    Heart Disease Father     Cancer Paternal Aunt     negative for cardiac disease       SOCIAL HISTORY     Social History     Socioeconomic History    Marital status: SINGLE     Spouse name: Not on file    Number of children: Not on file    Years of education: Not on file    Highest education level: Not on file   Social Needs    Financial resource strain: Not on file    Food insecurity - worry: Not on file    Food insecurity - inability: Not on file   [employer] needs - medical: Not on file   [employer] needs - non-medical: Not on file   Occupational History    Not on file   Tobacco Use    Smoking status: Current Every Day Smoker     Packs/day: 0.50     Years: 50.00     Pack years: 25.00    Smokeless tobacco: Never Used   Substance and Sexual Activity    Alcohol use: No    Drug use: No    Sexual activity: Not Currently     Partners: Male   Other Topics Concern    Not on file   Social History Narrative    Not on file         MEDICATIONS     Current Outpatient Medications   Medication Sig    metoprolol succinate (TOPROL-XL) 25 mg XL tablet Take 0.5 Tabs by mouth daily.  aspirin delayed-release 81 mg tablet Take 81 mg by mouth daily.  levothyroxine (SYNTHROID) 88 mcg tablet Take 88 mcg by mouth Daily (before breakfast). No current facility-administered medications for this visit.         I have reviewed the nurses notes, vitals, problem list, allergy list, medical history, family, social history and medications. REVIEW OF SYMPTOMS      General: Pt denies excessive weight gain or loss. Pt is able to conduct ADL's  HEENT: Denies blurred vision, headaches, hearing loss, epistaxis and difficulty swallowing. Respiratory: Denies cough, congestion, shortness of breath, MARY, wheezing or stridor. Cardiovascular: Denies precordial pain, palpitations, edema or PND  Gastrointestinal: Denies poor appetite, indigestion, abdominal pain or blood in stool  Genitourinary: Denies hematuria, dysuria, increased urinary frequency  Musculoskeletal: Denies joint pain or swelling from muscles or joints  Neurologic: Denies tremor, paresthesias, headache, or sensory motor disturbance  Psychiatric: Denies confusion, insomnia, depression  Integumentray: Denies rash, itching or ulcers. Hematologic: Denies easy bruising, bleeding       PHYSICAL EXAMINATION      Vitals:    11/14/18 1512   BP: 142/82   Pulse: (!) 59   Resp: 16   SpO2: 97%   Weight: 156 lb 6.4 oz (70.9 kg)   Height: 5' 4.5\" (1.638 m)     General: Well developed, in no acute distress. HEENT: No jaundice, oral mucosa moist, no oral ulcers  Neck: Supple, no stiffness, no lymphadenopathy, supple  Heart:  Normal S1/S2 negative S3 or S4. Regular, no murmur, gallop or rub, no jugular venous distention  Respiratory: Clear bilaterally x 4, no wheezing or rales  Abdomen:   Soft, non-tender, bowel sounds are active.   Extremities:  No edema, normal cap refill, no cyanosis. Musculoskeletal: No clubbing, no deformities  Neuro: A&Ox3, speech clear, gait stable, cooperative, no focal neurologic deficits  Skin: Skin color is normal. No rashes or lesions.  Non diaphoretic, moist.  Vascular: 2+ pulses symmetric in all extremities       DIAGNOSTIC DATA      EKG: Sinus rhythm       LABORATORY DATA      Lab Results   Component Value Date/Time    WBC 6.6 10/12/2018 11:13 AM    HGB 13.6 10/12/2018 11:13 AM    HCT 40.6 10/12/2018 11:13 AM    PLATELET 378 93/05/6988 11:13 AM    MCV 92 10/12/2018 11:13 AM      Lab Results   Component Value Date/Time    Sodium 142 10/12/2018 11:13 AM    Potassium 4.7 10/12/2018 11:13 AM    Chloride 103 10/12/2018 11:13 AM    CO2 24 10/12/2018 11:13 AM    Anion gap 5 09/24/2018 02:14 PM    Glucose 91 10/12/2018 11:13 AM    BUN 18 10/12/2018 11:13 AM    Creatinine 0.91 10/12/2018 11:13 AM    BUN/Creatinine ratio 20 10/12/2018 11:13 AM    GFR est AA 71 10/12/2018 11:13 AM    GFR est non-AA 62 10/12/2018 11:13 AM    Calcium 9.8 10/12/2018 11:13 AM    Bilirubin, total 0.3 10/12/2018 11:13 AM    AST (SGOT) 17 10/12/2018 11:13 AM    Alk. phosphatase 88 10/12/2018 11:13 AM    Protein, total 7.1 10/12/2018 11:13 AM    Albumin 4.2 10/12/2018 11:13 AM    Globulin 3.1 08/24/2017 01:50 AM    A-G Ratio 1.4 10/12/2018 11:13 AM    ALT (SGPT) 7 10/12/2018 11:13 AM           ASSESSMENT      1. Atrial fibrillation                                         L. Paroxysmal                        J. PVI 2015                        C. HASBLED 2  2. Severe anemia  3. Hypothyroidism  4. Premature ventricular contractions                        A.  Symptomatic  5. Diverticulosis       PLAN     Given patient has symptoms at this moment yet her EKG shows sinus rhythm, I suspect her symptoms are non-cardiac in etiology. Will discontinue Toprol. Plan for watchman implantation at [facility]. FOLLOW-UP     1 year        Thank you, Clinton Rendon NP and Dr. Renaldo Briggs for allowing me to participate in the care of this extraordinarily pleasant female. Please do not hesitate to contact me for further questions/concerns.          Ina Galaviz MD  Cardiac Electrophysiology / Cardiology    [address] [facility] [address]  [address] [facility], [facility], [address] [place] [address], [facility] [address]    [facility], [facility]  [phone] / [phone] Fax   [phone] / [phone] Fax

## 2019-01-03 ENCOUNTER — OFFICE VISIT (OUTPATIENT)
Dept: NEUROLOGY | Age: 74
End: 2019-01-03

## 2019-01-03 VITALS
BODY MASS INDEX: 26.15 KG/M2 | RESPIRATION RATE: 20 BRPM | WEIGHT: 153.2 LBS | SYSTOLIC BLOOD PRESSURE: 128 MMHG | DIASTOLIC BLOOD PRESSURE: 80 MMHG | HEART RATE: 73 BPM | OXYGEN SATURATION: 97 % | HEIGHT: 64 IN

## 2019-01-03 DIAGNOSIS — G25.0 ESSENTIAL TREMOR: Primary | ICD-10-CM

## 2019-01-03 DIAGNOSIS — R53.83 FATIGUE, UNSPECIFIED TYPE: ICD-10-CM

## 2019-01-03 RX ORDER — TOPIRAMATE 25 MG/1
TABLET ORAL
Qty: 45 TAB | Refills: 0 | Status: SHIPPED | OUTPATIENT
Start: 2019-01-03 | End: 2019-01-30

## 2019-01-03 RX ORDER — TOPIRAMATE 50 MG/1
TABLET, FILM COATED ORAL
Qty: 60 TAB | Refills: 2 | Status: SHIPPED | OUTPATIENT
Start: 2019-01-03 | End: 2019-02-25

## 2019-01-03 NOTE — PATIENT INSTRUCTIONS
A Healthy Lifestyle: Care Instructions  Your Care Instructions    A healthy lifestyle can help you feel good, stay at a healthy weight, and have plenty of energy for both work and play. A healthy lifestyle is something you can share with your whole family. A healthy lifestyle also can lower your risk for serious health problems, such as high blood pressure, heart disease, and diabetes. You can follow a few steps listed below to improve your health and the health of your family. Follow-up care is a key part of your treatment and safety. Be sure to make and go to all appointments, and call your doctor if you are having problems. It's also a good idea to know your test results and keep a list of the medicines you take. How can you care for yourself at home? · Do not eat too much sugar, fat, or fast foods. You can still have dessert and treats now and then. The goal is moderation. · Start small to improve your eating habits. Pay attention to portion sizes, drink less juice and soda pop, and eat more fruits and vegetables. ? Eat a healthy amount of food. A 3-ounce serving of meat, for example, is about the size of a deck of cards. Fill the rest of your plate with vegetables and whole grains. ? Limit the amount of soda and sports drinks you have every day. Drink more water when you are thirsty. ? Eat at least 5 servings of fruits and vegetables every day. It may seem like a lot, but it is not hard to reach this goal. A serving or helping is 1 piece of fruit, 1 cup of vegetables, or 2 cups of leafy, raw vegetables. Have an apple or some carrot sticks as an afternoon snack instead of a candy bar. Try to have fruits and/or vegetables at every meal.  · Make exercise part of your daily routine. You may want to start with simple activities, such as walking, bicycling, or slow swimming. Try to be active 30 to 60 minutes every day. You do not need to do all 30 to 60 minutes all at once.  For example, you can exercise 3 times a day for 10 or 20 minutes. Moderate exercise is safe for most people, but it is always a good idea to talk to your doctor before starting an exercise program.  · Keep moving. Steffany Banyordy the lawn, work in the garden, or [employer]. Take the stairs instead of the elevator at work. · If you smoke, quit. People who smoke have an increased risk for heart attack, stroke, cancer, and other lung illnesses. Quitting is hard, but there are ways to boost your chance of quitting tobacco for good. ? Use nicotine gum, patches, or lozenges. ? Ask your doctor about stop-smoking programs and medicines. ? Keep trying. In addition to reducing your risk of diseases in the future, you will notice some benefits soon after you stop using tobacco. If you have shortness of breath or asthma symptoms, they will likely get better within a few weeks after you quit. · Limit how much alcohol you drink. Moderate amounts of alcohol (up to 2 drinks a day for men, 1 drink a day for women) are okay. But drinking too much can lead to liver problems, high blood pressure, and other health problems. Family health  If you have a family, there are many things you can do together to improve your health. · Eat meals together as a family as often as possible. · Eat healthy foods. This includes fruits, vegetables, lean meats and dairy, and whole grains. · Include your family in your fitness plan. Most people think of activities such as jogging or tennis as the way to fitness, but there are many ways you and your family can be more active. Anything that makes you breathe hard and gets your heart pumping is exercise. Here are some tips:  ? Walk to do errands or to take your child to school or the bus.  ? Go for a family bike ride after dinner instead of watching TV. Where can you learn more? Go to http://martínez-renny.info/. Enter E265 in the search box to learn more about \"A Healthy Lifestyle: Care Instructions. \"  Current as of: December 7, 2017  Content Version: 11.8  © 9153-4197 Healthwise, Incorporated. Care instructions adapted under license by [employer] (which disclaims liability or warranty for this information). If you have questions about a medical condition or this instruction, always ask your healthcare professional. [address] any warranty or liability for your use of this information.

## 2019-01-03 NOTE — PROGRESS NOTES
Patient has come in today for tremors, balance issues, muscle and joint pain in lower limbs. Left hand tremors for about 40 years and has gone into bilateral tremors. Patient states that her daughter states that her head will therese every now and then. Patient feels like she's waddling, walking slower than in the past.    Fatigued-all the time, sleepy throughout the day and up all night. Joint and muscle pain in the calf's started in the soles of the feet and now has progressed to legs.

## 2019-01-15 ENCOUNTER — OFFICE VISIT (OUTPATIENT)
Dept: INTERNAL MEDICINE CLINIC | Age: 74
End: 2019-01-15

## 2019-01-15 VITALS
HEIGHT: 64 IN | DIASTOLIC BLOOD PRESSURE: 84 MMHG | HEART RATE: 64 BPM | WEIGHT: 153.8 LBS | RESPIRATION RATE: 18 BRPM | SYSTOLIC BLOOD PRESSURE: 124 MMHG | OXYGEN SATURATION: 96 % | BODY MASS INDEX: 26.26 KG/M2 | TEMPERATURE: 98.5 F

## 2019-01-15 DIAGNOSIS — Z98.890 S/P ABLATION OF ATRIAL FIBRILLATION: ICD-10-CM

## 2019-01-15 DIAGNOSIS — G25.0 ESSENTIAL TREMOR: ICD-10-CM

## 2019-01-15 DIAGNOSIS — E53.8 B12 DEFICIENCY: ICD-10-CM

## 2019-01-15 DIAGNOSIS — E55.9 VITAMIN D DEFICIENCY: ICD-10-CM

## 2019-01-15 DIAGNOSIS — Z86.2 HISTORY OF ANEMIA: ICD-10-CM

## 2019-01-15 DIAGNOSIS — F32.A DEPRESSION, UNSPECIFIED DEPRESSION TYPE: ICD-10-CM

## 2019-01-15 DIAGNOSIS — Z76.89 ESTABLISHING CARE WITH NEW DOCTOR, ENCOUNTER FOR: Primary | ICD-10-CM

## 2019-01-15 DIAGNOSIS — Z86.79 S/P ABLATION OF ATRIAL FIBRILLATION: ICD-10-CM

## 2019-01-15 DIAGNOSIS — E03.9 HYPOTHYROIDISM, UNSPECIFIED TYPE: Chronic | ICD-10-CM

## 2019-01-15 RX ORDER — BUPROPION HYDROCHLORIDE 150 MG/1
150 TABLET ORAL
Qty: 30 TAB | Refills: 1 | Status: SHIPPED | OUTPATIENT
Start: 2019-01-15 | End: 2019-01-22 | Stop reason: ALTCHOICE

## 2019-01-15 NOTE — PROGRESS NOTES
Aureliano Galvin is a 74 y.o. female Chief Complaint Patient presents with  New Patient [facility] Establish Care 1. Have you been to the ER, urgent care clinic since your last visit? Hospitalized since your last visit? No 
M 
2. Have you seen or consulted any other health care providers outside of the [address] [place] [address] since your last visit? Include any pap smears or colon screening. No 
 
 
Visit Vitals /84 (BP 1 Location: Left arm, BP Patient Position: Sitting) Pulse 64 Temp 98.5 °F (36.9 °C) (Oral) Resp 18 Ht 5' 4\" (1.626 m) Wt 153 lb 12.8 oz (69.8 kg) SpO2 96% BMI 26.40 kg/m² Health Maintenance Due Topic Date Due  Shingrix Vaccine Age 50> (1 of 2) 05/16/1993  GLAUCOMA SCREENING Q2Y  05/16/2008  Bone Densitometry (Dexa) Screening  05/16/2008  Pneumococcal 65+ Low/Medium Risk (1 of 2 - PCV13) 05/16/2008  MEDICARE YEARLY EXAM  03/14/2018  Influenza Age 5 to Adult  08/01/2018 Medication Reconciliation completed, changes noted.   Please  Update medication list.
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
## 2019-01-17 ENCOUNTER — HOSPITAL ENCOUNTER (OUTPATIENT)
Dept: LAB | Age: 74
Discharge: HOME | End: 2019-01-17
Payer: MEDICARE

## 2019-01-17 PROCEDURE — 36415 COLL VENOUS BLD VENIPUNCTURE: CPT

## 2019-01-17 PROCEDURE — 82607 VITAMIN B-12: CPT

## 2019-01-17 PROCEDURE — 80053 COMPREHEN METABOLIC PANEL: CPT

## 2019-01-17 PROCEDURE — 85025 COMPLETE CBC W/AUTO DIFF WBC: CPT

## 2019-01-17 PROCEDURE — 84443 ASSAY THYROID STIM HORMONE: CPT

## 2019-01-17 PROCEDURE — 82306 VITAMIN D 25 HYDROXY: CPT

## 2019-01-18 ENCOUNTER — TELEPHONE (OUTPATIENT)
Dept: CARDIOLOGY CLINIC | Age: 74
End: 2019-01-18

## 2019-01-18 LAB
25(OH)D3+25(OH)D2 SERPL-MCNC: 24.2 NG/ML (ref 30–100)
ALBUMIN SERPL-MCNC: 4.2 G/DL (ref 3.5–4.8)
ALBUMIN/GLOB SERPL: 1.8 {RATIO} (ref 1.2–2.2)
ALP SERPL-CCNC: 80 IU/L (ref 39–117)
ALT SERPL-CCNC: 10 IU/L (ref 0–32)
AST SERPL-CCNC: 13 IU/L (ref 0–40)
BASOPHILS # BLD AUTO: 0 X10E3/UL (ref 0–0.2)
BASOPHILS NFR BLD AUTO: 0 %
BILIRUB SERPL-MCNC: <0.2 MG/DL (ref 0–1.2)
BUN SERPL-MCNC: 30 MG/DL (ref 8–27)
BUN/CREAT SERPL: 28 (ref 12–28)
CALCIUM SERPL-MCNC: 9.7 MG/DL (ref 8.7–10.3)
CHLORIDE SERPL-SCNC: 108 MMOL/L (ref 96–106)
CO2 SERPL-SCNC: 20 MMOL/L (ref 20–29)
CREAT SERPL-MCNC: 1.09 MG/DL (ref 0.57–1)
EOSINOPHIL # BLD AUTO: 0.4 X10E3/UL (ref 0–0.4)
EOSINOPHIL NFR BLD AUTO: 5 %
ERYTHROCYTE [DISTWIDTH] IN BLOOD BY AUTOMATED COUNT: 16 % (ref 12.3–15.4)
GLOBULIN SER CALC-MCNC: 2.3 G/DL (ref 1.5–4.5)
GLUCOSE SERPL-MCNC: 93 MG/DL (ref 65–99)
HCT VFR BLD AUTO: 38.6 % (ref 34–46.6)
HGB BLD-MCNC: 12.7 G/DL (ref 11.1–15.9)
IMM GRANULOCYTES # BLD AUTO: 0 X10E3/UL (ref 0–0.1)
IMM GRANULOCYTES NFR BLD AUTO: 0 %
INTERPRETATION: NORMAL
LYMPHOCYTES # BLD AUTO: 2.1 X10E3/UL (ref 0.7–3.1)
LYMPHOCYTES NFR BLD AUTO: 28 %
MCH RBC QN AUTO: 30.9 PG (ref 26.6–33)
MCHC RBC AUTO-ENTMCNC: 32.9 G/DL (ref 31.5–35.7)
MCV RBC AUTO: 94 FL (ref 79–97)
MONOCYTES # BLD AUTO: 0.7 X10E3/UL (ref 0.1–0.9)
MONOCYTES NFR BLD AUTO: 9 %
NEUTROPHILS # BLD AUTO: 4.4 X10E3/UL (ref 1.4–7)
NEUTROPHILS NFR BLD AUTO: 58 %
PLATELET # BLD AUTO: 266 X10E3/UL (ref 150–379)
POTASSIUM SERPL-SCNC: 4.1 MMOL/L (ref 3.5–5.2)
PROT SERPL-MCNC: 6.5 G/DL (ref 6–8.5)
RBC # BLD AUTO: 4.11 X10E6/UL (ref 3.77–5.28)
SODIUM SERPL-SCNC: 145 MMOL/L (ref 134–144)
TSH SERPL DL<=0.005 MIU/L-ACNC: 0.9 UIU/ML (ref 0.45–4.5)
VIT B12 SERPL-MCNC: 306 PG/ML (ref 232–1245)
WBC # BLD AUTO: 7.5 X10E3/UL (ref 3.4–10.8)

## 2019-01-18 NOTE — TELEPHONE ENCOUNTER
Pt calling about the watchman list status. She stated that someone was to call her back in December, but no call. She can be reached at [phone].     [employer]

## 2019-01-18 NOTE — TELEPHONE ENCOUNTER
Spoke with patient. Watchman scheduling still on hold. Should be able to schedule in about 3 weeks at [facility] per Dr. Alek King.

## 2019-01-22 ENCOUNTER — TELEPHONE (OUTPATIENT)
Dept: INTERNAL MEDICINE CLINIC | Age: 74
End: 2019-01-22

## 2019-01-22 DIAGNOSIS — F32.A DEPRESSION, UNSPECIFIED DEPRESSION TYPE: Primary | ICD-10-CM

## 2019-01-22 RX ORDER — DULOXETIN HYDROCHLORIDE 20 MG/1
20 CAPSULE, DELAYED RELEASE ORAL DAILY
Qty: 30 CAP | Refills: 1 | Status: SHIPPED | OUTPATIENT
Start: 2019-01-22 | End: 2019-02-25

## 2019-01-22 NOTE — TELEPHONE ENCOUNTER
Patient states she believes she's having allergic reaction to her Wellbutrin Script. Sates its a new medication for her and she's only been on it three days but states after the first day she became itchy , states her ears itch and her scalp is also itchy and scaly .   Patient states lats night began having night sweats , patient can be reached at [phone] , patient declined to make an apt due to just being seen

## 2019-01-22 NOTE — TELEPHONE ENCOUNTER
Notified Pt as per provider. Pt states her f/u is on 2/28/19 and she would like to try a different medication in the meantime.

## 2019-01-22 NOTE — TELEPHONE ENCOUNTER
----- Message from Ulises Cortes sent at 1/22/2019  4:07 PM EST -----  Regarding: Dr. Arvin Ferris / Telephone  Contact: [phone]   Pt requested a return call regarding lab results and current medication.

## 2019-01-23 NOTE — TELEPHONE ENCOUNTER
Verified 3 Pt ID. Notified Pt of lab results as per provider. Pt states she has not heard from her pharmacy yet re: new Rx. Advised Pt Rx has been sent to pharmacy, contact them to confirm. Pt verbalized understanding.

## 2019-01-27 DIAGNOSIS — G25.0 ESSENTIAL TREMOR: ICD-10-CM

## 2019-01-30 RX ORDER — TOPIRAMATE 25 MG/1
50 TABLET ORAL 2 TIMES DAILY
Qty: 60 TAB | Refills: 0 | OUTPATIENT
Start: 2019-01-30

## 2019-01-30 NOTE — TELEPHONE ENCOUNTER
Patient was given a Rx for Topamax 50 mg twice a day that she was supposed to start when finishing the Topamax 25 mg tablet prescription. Denied refill of the Topamax 25 mg tablets.

## 2019-01-31 ENCOUNTER — DOCUMENTATION ONLY (OUTPATIENT)
Dept: INTERNAL MEDICINE CLINIC | Age: 74
End: 2019-01-31

## 2019-01-31 NOTE — PROGRESS NOTES
[facility] Records have been placed on Dr Manuel's desk for review. Vaccination records have been updated.

## 2019-02-01 ENCOUNTER — TELEPHONE (OUTPATIENT)
Dept: INTERNAL MEDICINE CLINIC | Age: 74
End: 2019-02-01

## 2019-02-01 NOTE — TELEPHONE ENCOUNTER
----- Message from [facility] sent at 2/1/2019 12:06 PM EST -----  Regarding: Dr. Radha Leyva declined to schedule an appt she just wants to inform her doctor that Duloxetine Rx is giving her side effects of sleepiness and she would like to discontinue the medication. Best contact [phone].

## 2019-02-02 PROBLEM — E78.49 OTHER HYPERLIPIDEMIA: Status: ACTIVE | Noted: 2019-02-02

## 2019-02-05 ENCOUNTER — TELEPHONE (OUTPATIENT)
Dept: NEUROLOGY | Age: 74
End: 2019-02-05

## 2019-02-19 ENCOUNTER — HOSPITAL ENCOUNTER (OUTPATIENT)
Dept: VASCULAR SURGERY | Age: 74
Discharge: HOME | End: 2019-02-19
Attending: NURSE PRACTITIONER
Payer: MEDICARE

## 2019-02-19 DIAGNOSIS — M79.661 RIGHT CALF PAIN: ICD-10-CM

## 2019-02-19 PROCEDURE — 93971 EXTREMITY STUDY: CPT

## 2019-02-21 ENCOUNTER — TELEPHONE (OUTPATIENT)
Dept: CARDIOLOGY CLINIC | Age: 74
End: 2019-02-21

## 2019-02-21 NOTE — TELEPHONE ENCOUNTER
Pt c/o MARY for a couple days - no other symptoms - she wants to come in to see Dr Juan Daniel Wong.

## 2019-02-21 NOTE — TELEPHONE ENCOUNTER
Patient states she has been on a waiting list for the Watchman Procedure and is calling to check in because it has been a couple a months. Please call back!     Phone: [phone]

## 2019-02-25 ENCOUNTER — OFFICE VISIT (OUTPATIENT)
Dept: CARDIOLOGY CLINIC | Age: 74
End: 2019-02-25

## 2019-02-25 VITALS
BODY MASS INDEX: 24.59 KG/M2 | SYSTOLIC BLOOD PRESSURE: 128 MMHG | WEIGHT: 144 LBS | HEIGHT: 64 IN | OXYGEN SATURATION: 98 % | DIASTOLIC BLOOD PRESSURE: 82 MMHG | HEART RATE: 68 BPM

## 2019-02-25 DIAGNOSIS — I48.91 ATRIAL FIBRILLATION, UNSPECIFIED TYPE (HCC): Primary | ICD-10-CM

## 2019-02-25 DIAGNOSIS — R53.82 CHRONIC FATIGUE: ICD-10-CM

## 2019-02-25 DIAGNOSIS — Z86.79 S/P ABLATION OF ATRIAL FIBRILLATION: ICD-10-CM

## 2019-02-25 DIAGNOSIS — Z98.890 S/P ABLATION OF ATRIAL FIBRILLATION: ICD-10-CM

## 2019-02-25 DIAGNOSIS — D64.9 SYMPTOMATIC ANEMIA: ICD-10-CM

## 2019-02-25 RX ORDER — GLUCOSAMINE SULFATE 1500 MG
POWDER IN PACKET (EA) ORAL DAILY
COMMUNITY
End: 2019-07-31

## 2019-02-25 NOTE — PROGRESS NOTES
HISTORY OF PRESENTING ILLNESS      Kj Cartwright is a 74 y.o. female with paroxysmal atrial fibrillation status post pulmonary vein isolation in 1 in [place], hypothyroidism and severe anemia.  She was previously on rivaroxaban for CVA risk reduction of her atrial fibrillation however this was discontinued 1 month ago. Shannen Fountain has undergone multiple iron transfusions for anemia in the past including 3 transfusions last month. Nuclear stress test in 5/2017 demonstrated preserved LV function without ischemia. She underwent holter monitor which demonstrated 20% PVC burden and failed to reveal atrial fibrillation. She was offered Flecainide 50mg BID however she was hesitant to start this. She was planned for Watchman; however, this was postponed. She was recently seen by hematologist who felt that her anemia was not driving her symptoms. We started a trial of low-dose Toprol which has been ineffective in improving her symptoms. EKG today shows sinus rhythm. She has constant fatigue and dizziness. Symptoms were suspected to be non cardiac in etiology and toprol was discontinued. She denies symptoms r/t AF.         ACTIVE PROBLEM LIST     Patient Active Problem List    Diagnosis Date Noted    Other hyperlipidemia 02/02/2019    Symptomatic anemia 08/23/2017    Hypothyroid 08/23/2017    Cigarette nicotine dependence without complication 77/43/0482    Dental infection 05/20/2017    Calculus of gallbladder with chronic cholecystitis without obstruction 08/15/2016    S/P ablation of atrial fibrillation 08/15/2016    Hashimoto's thyroiditis 08/15/2016    Irritable bowel syndrome with diarrhea 08/15/2016    Paroxysmal A-fib ([address] 09/24/2015           PAST MEDICAL HISTORY     Past Medical History:   Diagnosis Date    Anemia     Atrial fibrillation ([address]     s/p pulm vein isolation 2015    Atrial fibrillation with rapid ventricular response ([address] 5/20/2017    Depression     GERD (gastroesophageal reflux disease)     Hypothyroidism     Ill-defined condition     bleeding in liver while on Xarelto           PAST SURGICAL HISTORY     Past Surgical History:   Procedure Laterality Date    CARDIAC SURG PROCEDURE UNLIST      cardioversionx4    COLONOSCOPY N/A 9/6/2017    COLONOSCOPY performed by Maria Eugenia Beck MD at [facility] HX AFIB ABLATION  2016    HX CATARACT REMOVAL Bilateral     HX CHOLECYSTECTOMY  08/26/2016    HX ORTHOPAEDIC Bilateral     arthroscopic knee surgery          ALLERGIES     No Known Allergies       FAMILY HISTORY     Family History   Problem Relation Age of Onset    Heart Disease Father     Cancer Paternal Aunt     negative for cardiac disease       SOCIAL HISTORY     Social History     Socioeconomic History    Marital status: SINGLE     Spouse name: Not on file    Number of children: Not on file    Years of education: Not on file    Highest education level: Not on file   Tobacco Use    Smoking status: Current Every Day Smoker     Packs/day: 0.50     Years: 50.00     Pack years: 25.00    Smokeless tobacco: Never Used   Substance and Sexual Activity    Alcohol use: No    Drug use: No    Sexual activity: Not Currently     Partners: Male         MEDICATIONS     Current Outpatient Medications   Medication Sig    DULoxetine (CYMBALTA) 20 mg capsule Take 1 Cap by mouth daily.  topiramate (TOPAMAX) 50 mg tablet Take 1 tab twice a day for migraine prevention    aspirin delayed-release 81 mg tablet Take 81 mg by mouth daily.  levothyroxine (SYNTHROID) 88 mcg tablet Take 88 mcg by mouth Daily (before breakfast). No current facility-administered medications for this visit. I have reviewed the nurses notes, vitals, problem list, allergy list, medical history, family, social history and medications. REVIEW OF SYMPTOMS      General: Pt denies excessive weight gain or loss.  Pt is able to conduct ADL's  HEENT: Denies blurred vision, headaches, hearing loss, epistaxis and difficulty swallowing. Respiratory: Denies cough, congestion, shortness of breath, MARY, wheezing or stridor. Cardiovascular: Denies precordial pain, palpitations, edema or PND  Gastrointestinal: Denies poor appetite, indigestion, abdominal pain or blood in stool  Genitourinary: Denies hematuria, dysuria, increased urinary frequency  Musculoskeletal: Denies joint pain or swelling from muscles or joints  Neurologic: Denies tremor, paresthesias, headache, or sensory motor disturbance  Psychiatric: Denies confusion, insomnia, depression  Integumentray: Denies rash, itching or ulcers. Hematologic: Denies easy bruising, bleeding       PHYSICAL EXAMINATION      There were no vitals filed for this visit. General: Well developed, in no acute distress. HEENT: No jaundice, oral mucosa moist, no oral ulcers  Neck: Supple, no stiffness, no lymphadenopathy, supple  Heart:  Normal S1/S2 negative S3 or S4. Regular, no murmur, gallop or rub, no jugular venous distention  Respiratory: Clear bilaterally x 4, no wheezing or rales  Abdomen:   Soft, non-tender, bowel sounds are active.   Extremities:  No edema, normal cap refill, no cyanosis. Musculoskeletal: No clubbing, no deformities  Neuro: A&Ox3, speech clear, gait stable, cooperative, no focal neurologic deficits  Skin: Skin color is normal. No rashes or lesions.  Non diaphoretic, moist.  Vascular: 2+ pulses symmetric in all extremities       DIAGNOSTIC DATA      EKG: sinus rhythm      LABORATORY DATA      Lab Results   Component Value Date/Time    WBC 7.5 01/17/2019 02:53 PM    HGB 12.7 01/17/2019 02:53 PM    HCT 38.6 01/17/2019 02:53 PM    PLATELET 469 63/24/7725 02:53 PM    MCV 94 01/17/2019 02:53 PM      Lab Results   Component Value Date/Time    Sodium 145 (H) 01/17/2019 02:53 PM    Potassium 4.1 01/17/2019 02:53 PM    Chloride 108 (H) 01/17/2019 02:53 PM    CO2 20 01/17/2019 02:53 PM    Anion gap 5 09/24/2018 02:14 PM    Glucose 93 01/17/2019 02:53 PM BUN 30 (H) 01/17/2019 02:53 PM    Creatinine 1.09 (H) 01/17/2019 02:53 PM    BUN/Creatinine ratio 28 01/17/2019 02:53 PM    GFR est AA 57 (L) 01/17/2019 02:53 PM    GFR est non-AA 50 (L) 01/17/2019 02:53 PM    Calcium 9.7 01/17/2019 02:53 PM    Bilirubin, total <0.2 01/17/2019 02:53 PM    AST (SGOT) 13 01/17/2019 02:53 PM    Alk. phosphatase 80 01/17/2019 02:53 PM    Protein, total 6.5 01/17/2019 02:53 PM    Albumin 4.2 01/17/2019 02:53 PM    Globulin 3.1 08/24/2017 01:50 AM    A-G Ratio 1.8 01/17/2019 02:53 PM    ALT (SGPT) 10 01/17/2019 02:53 PM           ASSESSMENT      1. Atrial fibrillation                                         A. Paroxysmal                        H. PVI 2015                        C. HASBLED 2  2. Severe anemia  3. Hypothyroidism  4. Premature ventricular contractions                        A.  Symptomatic  5. Diverticulosis     PLAN     Discussed watchman device which she would like to proceed with this. The patient has a CHADSVASC/CHADS 2 score of 3. The patient feels strongly that anticoagulation and documented stroke risk greatly impacts his/her quality of life. The patient is a candidate for Watchman, a left atrial appendage closure (LAAC) device to reduce risk of thromboembolism. The patient has need for anticoagulation and is considered a high risk for stroke, but has a relative contraindication for long term anticoagulation. The patient is suitable for short term warfarin but deemed unable to take long term oral anticoagulation following the conclusion of shared decision making interaction with the patient. I have discussed at length the risks/benefits and alternatives of this procedure with regards to stroke risk versus bleeding risk. The patient understands that anticoagulation will be maintained in a variety of forms during the first year and agrees with plan.  Risks include but not limited to: infection, bleeding, vessel injury, cardiac perforation at times requiring drainage or surgery, heart failure, migration of the device, emergency surgery, myocardial infarction, stroke and death. Will plan for Watchman at [facility]. FOLLOW-UP       Thank you, Jelena Bear MD for allowing me to participate in the care of this extraordinarily pleasant female. Please do not hesitate to contact me for further questions/concerns. Juan Carlos Mendez NP     Patient seen and examined by me with nurse practitioner. I personally performed all components of the history, physical, and medical decision making and agree with the assessment and plan with minor modifications as noted.        Berkley Bello MD  Cardiac Electrophysiology / Cardiology    [place] [address].  [address] [place] [address], [facility], [address]  [phone] / [phone] Fax   [phone] / [phone] Fax

## 2019-03-08 ENCOUNTER — HOSPITAL ENCOUNTER (OUTPATIENT)
Dept: MRI IMAGING | Age: 74
Discharge: HOME | End: 2019-03-08
Attending: NURSE PRACTITIONER

## 2019-03-08 DIAGNOSIS — R25.1 TREMOR: ICD-10-CM

## 2019-03-08 DIAGNOSIS — R26.9 ABNORMALITY OF GAIT: ICD-10-CM

## 2019-03-16 ENCOUNTER — HOSPITAL ENCOUNTER (OUTPATIENT)
Dept: MRI IMAGING | Age: 74
Discharge: HOME | End: 2019-03-16
Attending: NURSE PRACTITIONER
Payer: MEDICARE

## 2019-03-16 PROCEDURE — 70551 MRI BRAIN STEM W/O DYE: CPT

## 2019-03-19 ENCOUNTER — TELEPHONE (OUTPATIENT)
Dept: CARDIOLOGY CLINIC | Age: 74
End: 2019-03-19

## 2019-03-19 NOTE — TELEPHONE ENCOUNTER
Patient stated shes been on the waiting list for a procedure for a while now and shes calling for an update   Phone: [phone]

## 2019-03-25 NOTE — TELEPHONE ENCOUNTER
Spoke with patient. Would like to hold off on Watchman procedure until negotiations are approved for [facility].

## 2019-05-13 ENCOUNTER — TELEPHONE (OUTPATIENT)
Dept: CARDIOLOGY CLINIC | Age: 74
End: 2019-05-13

## 2019-05-13 NOTE — TELEPHONE ENCOUNTER
Patient stated she has been on the waiting list for months and months for the watchman procedure and she would like to speak with someone   Phone:  [phone]

## 2019-05-13 NOTE — TELEPHONE ENCOUNTER
Returned call, ID verified using two patient identifiers. Advised patient that she is still on the [address] [place] [address] waiting list. But that per our last notes  she prefers to have procedure at [facility]. Advised patient at this time Dr. Olive Brush is only doing Watchman procedures at [facility]. Patient verbalizes understanding of all information.

## 2019-06-04 ENCOUNTER — HOSPITAL ENCOUNTER (OUTPATIENT)
Dept: LAB | Age: 74
Discharge: HOME | End: 2019-06-04

## 2019-06-13 ENCOUNTER — TELEPHONE (OUTPATIENT)
Dept: CARDIOLOGY CLINIC | Age: 74
End: 2019-06-13

## 2019-06-13 NOTE — TELEPHONE ENCOUNTER
Spoke with patient. Will plan for Watchman Implant at [facility] on 7/15. Officce visit scheduled for 6/26 H&P update.

## 2019-06-26 ENCOUNTER — OFFICE VISIT (OUTPATIENT)
Dept: CARDIOLOGY CLINIC | Age: 74
End: 2019-06-26

## 2019-06-26 VITALS
SYSTOLIC BLOOD PRESSURE: 128 MMHG | WEIGHT: 144 LBS | OXYGEN SATURATION: 98 % | HEART RATE: 70 BPM | DIASTOLIC BLOOD PRESSURE: 78 MMHG | BODY MASS INDEX: 24.59 KG/M2 | HEIGHT: 64 IN | RESPIRATION RATE: 16 BRPM

## 2019-06-26 DIAGNOSIS — I48.91 ATRIAL FIBRILLATION, UNSPECIFIED TYPE (HCC): Primary | ICD-10-CM

## 2019-06-26 NOTE — PROGRESS NOTES
Vu Martell is a 66 y.o. female    Chief Complaint   Patient presents with    Follow-up    Irregular Heart Beat     PAF, PVC's    Other     Watchman discussion       Visit Vitals  /78 (BP 1 Location: Left arm, BP Patient Position: Sitting)   Pulse 70   Resp 16   Ht 5' 4\" (1.626 m)   Wt 144 lb (65.3 kg)   SpO2 98%   BMI 24.72 kg/m²       1. Have you been to the ER, urgent care clinic since your last visit? Hospitalized since your last visit? NO    2. Have you seen or consulted any other health care providers outside of the [address] [place] [address] since your last visit? Include any pap smears or colon screening.   NO

## 2019-06-26 NOTE — PROGRESS NOTES
HISTORY OF PRESENTING ILLNESS      Rhona Cintron is a 66 y.o. female planned for WATCHMAN in July who has questions regarding her upcoming procedure. She prefers to be on a NOAC post WATCHMAN and this was discussed.         ACTIVE PROBLEM LIST     Patient Active Problem List    Diagnosis Date Noted    Other hyperlipidemia 02/02/2019    Symptomatic anemia 08/23/2017    Hypothyroid 08/23/2017    Cigarette nicotine dependence without complication 85/28/1978    Dental infection 05/20/2017    Calculus of gallbladder with chronic cholecystitis without obstruction 08/15/2016    S/P ablation of atrial fibrillation 08/15/2016    Hashimoto's thyroiditis 08/15/2016    Irritable bowel syndrome with diarrhea 08/15/2016    Paroxysmal A-fib ([address] 09/24/2015           PAST MEDICAL HISTORY     Past Medical History:   Diagnosis Date    Anemia     Atrial fibrillation (HCC)     s/p pulm vein isolation 2015    Atrial fibrillation with rapid ventricular response ([address] 5/20/2017    Depression     GERD (gastroesophageal reflux disease)     Hypothyroidism     Ill-defined condition     bleeding in liver while on Xarelto           PAST SURGICAL HISTORY     Past Surgical History:   Procedure Laterality Date    CARDIAC SURG PROCEDURE UNLIST      cardioversionx4    COLONOSCOPY N/A 9/6/2017    COLONOSCOPY performed by Jaz Humphrey MD at [facility] HX AFIB ABLATION  2016    HX CATARACT REMOVAL Bilateral     HX CHOLECYSTECTOMY  08/26/2016    HX ORTHOPAEDIC Bilateral     arthroscopic knee surgery          ALLERGIES     No Known Allergies       FAMILY HISTORY     Family History   Problem Relation Age of Onset    Heart Disease Father     Cancer Paternal Aunt     negative for cardiac disease       SOCIAL HISTORY     Social History     Socioeconomic History    Marital status: SINGLE     Spouse name: Not on file    Number of children: Not on file    Years of education: Not on file    Highest education level: Not on file   Tobacco Use    Smoking status: Current Every Day Smoker     Packs/day: 0.50     Years: 50.00     Pack years: 25.00    Smokeless tobacco: Never Used   Substance and Sexual Activity    Alcohol use: No    Drug use: No    Sexual activity: Not Currently     Partners: Male         MEDICATIONS     Current Outpatient Medications   Medication Sig    cholecalciferol (VITAMIN D3) 1,000 unit cap Take  by mouth daily.  aspirin delayed-release 81 mg tablet Take 81 mg by mouth daily.  levothyroxine (SYNTHROID) 88 mcg tablet Take 88 mcg by mouth Daily (before breakfast). No current facility-administered medications for this visit. I have reviewed the nurses notes, vitals, problem list, allergy list, medical history, family, social history and medications. REVIEW OF SYMPTOMS      General: Pt denies excessive weight gain or loss. Pt is able to conduct ADL's  HEENT: Denies blurred vision, headaches, hearing loss, epistaxis and difficulty swallowing. Respiratory: Denies cough, congestion, shortness of breath, MARY, wheezing or stridor. Cardiovascular: Denies precordial pain, palpitations, edema or PND  Gastrointestinal: Denies poor appetite, indigestion, abdominal pain or blood in stool  Genitourinary: Denies hematuria, dysuria, increased urinary frequency  Musculoskeletal: Denies joint pain or swelling from muscles or joints  Neurologic: Denies tremor, paresthesias, headache, or sensory motor disturbance  Psychiatric: Denies confusion, insomnia, depression  Integumentray: Denies rash, itching or ulcers. Hematologic: Denies easy bruising, bleeding       PHYSICAL EXAMINATION      Vitals:    06/26/19 1509   BP: 128/78   Pulse: 70   Resp: 16   SpO2: 98%   Weight: 144 lb (65.3 kg)   Height: 5' 4\" (1.626 m)     General: Well developed, in no acute distress.   HEENT: No jaundice, oral mucosa moist, no oral ulcers  Neck: Supple, no stiffness, no lymphadenopathy, supple  Heart:  Normal S1/S2 negative S3 or S4. Regular, no murmur, gallop or rub, no jugular venous distention  Respiratory: Clear bilaterally x 4, no wheezing or rales  Abdomen:   Soft, non-tender, bowel sounds are active.   Extremities:  No edema, normal cap refill, no cyanosis. Musculoskeletal: No clubbing, no deformities  Neuro: A&Ox3, speech clear, gait stable, cooperative, no focal neurologic deficits  Skin: Skin color is normal. No rashes or lesions. Non diaphoretic, moist.  Vascular: 2+ pulses symmetric in all extremities       DIAGNOSTIC DATA      EKG:        LABORATORY DATA      Lab Results   Component Value Date/Time    WBC 7.5 01/17/2019 02:53 PM    HGB 12.7 01/17/2019 02:53 PM    HCT 38.6 01/17/2019 02:53 PM    PLATELET 056 52/21/7370 02:53 PM    MCV 94 01/17/2019 02:53 PM      Lab Results   Component Value Date/Time    Sodium 145 (H) 01/17/2019 02:53 PM    Potassium 4.1 01/17/2019 02:53 PM    Chloride 108 (H) 01/17/2019 02:53 PM    CO2 20 01/17/2019 02:53 PM    Anion gap 5 09/24/2018 02:14 PM    Glucose 93 01/17/2019 02:53 PM    BUN 30 (H) 01/17/2019 02:53 PM    Creatinine 1.09 (H) 01/17/2019 02:53 PM    BUN/Creatinine ratio 28 01/17/2019 02:53 PM    GFR est AA 57 (L) 01/17/2019 02:53 PM    GFR est non-AA 50 (L) 01/17/2019 02:53 PM    Calcium 9.7 01/17/2019 02:53 PM    Bilirubin, total <0.2 01/17/2019 02:53 PM    AST (SGOT) 13 01/17/2019 02:53 PM    Alk. phosphatase 80 01/17/2019 02:53 PM    Protein, total 6.5 01/17/2019 02:53 PM    Albumin 4.2 01/17/2019 02:53 PM    Globulin 3.1 08/24/2017 01:50 AM    A-G Ratio 1.8 01/17/2019 02:53 PM    ALT (SGPT) 10 01/17/2019 02:53 PM           ASSESSMENT      1. Atrial fibrillation                                         J. Paroxysmal                        T. PVI 2015                        C. HASBLED 2  2. Severe anemia  3. Hypothyroidism  4. Premature ventricular contractions                        A.  Symptomatic  5.  Diverticulosis       PLAN     Proceed with WATCHMAN as planned FOLLOW-UP     Post procedure      Thank you, Bennett Pedraza NP for allowing me to participate in the care of this extraordinarily pleasant female. Please do not hesitate to contact me for further questions/concerns. Robinson Sanchez MD  Cardiac Electrophysiology / Cardiology    [address] [facility].  [address] [place] [address], [address] [place] [address], [facility]  [phone] / [phone] Fax   [phone] / [phone] Fax    Addendum:    The patient feels strongly that anticoagulation and documented stroke risk greatly impacts his/her quality of life. The patient is a candidate for Watchman, a left atrial appendage closure (LAAC) device to reduce risk of thromboembolism. The patient has need for anticoagulation and is considered a high risk for stroke, but has a relative contraindication for long term anticoagulation. The patient is suitable for short term warfarin but deemed unable to take long term oral anticoagulation following the conclusion of shared decision making interaction with the patient. I have discussed at length the risks/benefits and alternatives of this procedure with regards to stroke risk versus bleeding risk. The patient understands that anticoagulation will be maintained in a variety of forms during the first year and agrees with plan. Risks include but not limited to: infection, bleeding, vessel injury, cardiac perforation at times requiring drainage or surgery, heart failure, migration of the device, emergency surgery, myocardial infarction, stroke and death. Based on both stroke and bleeding risk, a shared decision has been made to pursue closure of left atrial appendage as a safe and effective alternative to oral anticoagulant therapy for stroke prophylaxis and to reduce his/her long term risk of bleeding.

## 2019-07-11 ENCOUNTER — HOSPITAL ENCOUNTER (OUTPATIENT)
Dept: GENERAL RADIOLOGY | Age: 74
Discharge: HOME | End: 2019-07-11
Attending: NURSE PRACTITIONER
Payer: MEDICARE

## 2019-07-11 ENCOUNTER — HOSPITAL ENCOUNTER (OUTPATIENT)
Dept: PREADMISSION TESTING | Age: 74
Discharge: HOME | End: 2019-07-11
Payer: MEDICARE

## 2019-07-11 VITALS
HEART RATE: 67 BPM | WEIGHT: 146 LBS | SYSTOLIC BLOOD PRESSURE: 121 MMHG | DIASTOLIC BLOOD PRESSURE: 76 MMHG | BODY MASS INDEX: 24.92 KG/M2 | TEMPERATURE: 98.6 F | HEIGHT: 64 IN

## 2019-07-11 LAB
ALBUMIN SERPL-MCNC: 3.5 G/DL (ref 3.5–5)
ALBUMIN/GLOB SERPL: 1.1 {RATIO} (ref 1.1–2.2)
ALP SERPL-CCNC: 82 U/L (ref 45–117)
ALT SERPL-CCNC: 14 U/L (ref 12–78)
ANION GAP SERPL CALC-SCNC: 3 MMOL/L (ref 5–15)
APPEARANCE UR: CLEAR
AST SERPL-CCNC: 14 U/L (ref 15–37)
BACTERIA URNS QL MICRO: NEGATIVE /HPF
BASOPHILS # BLD: 0.1 K/UL (ref 0–0.1)
BASOPHILS NFR BLD: 1 % (ref 0–1)
BILIRUB SERPL-MCNC: 0.3 MG/DL (ref 0.2–1)
BILIRUB UR QL: NEGATIVE
BUN SERPL-MCNC: 21 MG/DL (ref 6–20)
BUN/CREAT SERPL: 26 (ref 12–20)
CALCIUM SERPL-MCNC: 9.1 MG/DL (ref 8.5–10.1)
CHLORIDE SERPL-SCNC: 111 MMOL/L (ref 97–108)
CO2 SERPL-SCNC: 28 MMOL/L (ref 21–32)
COLOR UR: ABNORMAL
CREAT SERPL-MCNC: 0.82 MG/DL (ref 0.55–1.02)
DIFFERENTIAL METHOD BLD: ABNORMAL
EOSINOPHIL # BLD: 0.2 K/UL (ref 0–0.4)
EOSINOPHIL NFR BLD: 2 % (ref 0–7)
EPITH CASTS URNS QL MICRO: ABNORMAL /LPF
ERYTHROCYTE [DISTWIDTH] IN BLOOD BY AUTOMATED COUNT: 16.3 % (ref 11.5–14.5)
GLOBULIN SER CALC-MCNC: 3.1 G/DL (ref 2–4)
GLUCOSE SERPL-MCNC: 87 MG/DL (ref 65–100)
GLUCOSE UR STRIP.AUTO-MCNC: NEGATIVE MG/DL
HCT VFR BLD AUTO: 36.2 % (ref 35–47)
HGB BLD-MCNC: 11.2 G/DL (ref 11.5–16)
HGB UR QL STRIP: ABNORMAL
HYALINE CASTS URNS QL MICRO: ABNORMAL /LPF (ref 0–5)
IMM GRANULOCYTES # BLD AUTO: 0 K/UL (ref 0–0.04)
IMM GRANULOCYTES NFR BLD AUTO: 0 % (ref 0–0.5)
KETONES UR QL STRIP.AUTO: NEGATIVE MG/DL
LEUKOCYTE ESTERASE UR QL STRIP.AUTO: NEGATIVE
LYMPHOCYTES # BLD: 1.2 K/UL (ref 0.8–3.5)
LYMPHOCYTES NFR BLD: 19 % (ref 12–49)
MCH RBC QN AUTO: 28.2 PG (ref 26–34)
MCHC RBC AUTO-ENTMCNC: 30.9 G/DL (ref 30–36.5)
MCV RBC AUTO: 91.2 FL (ref 80–99)
MONOCYTES # BLD: 0.5 K/UL (ref 0–1)
MONOCYTES NFR BLD: 8 % (ref 5–13)
NEUTS SEG # BLD: 4.7 K/UL (ref 1.8–8)
NEUTS SEG NFR BLD: 70 % (ref 32–75)
NITRITE UR QL STRIP.AUTO: NEGATIVE
NRBC # BLD: 0 K/UL (ref 0–0.01)
NRBC BLD-RTO: 0 PER 100 WBC
PH UR STRIP: 5 [PH] (ref 5–8)
PLATELET # BLD AUTO: 276 K/UL (ref 150–400)
PMV BLD AUTO: 11.3 FL (ref 8.9–12.9)
POTASSIUM SERPL-SCNC: 4.3 MMOL/L (ref 3.5–5.1)
PROT SERPL-MCNC: 6.6 G/DL (ref 6.4–8.2)
PROT UR STRIP-MCNC: NEGATIVE MG/DL
RBC # BLD AUTO: 3.97 M/UL (ref 3.8–5.2)
RBC #/AREA URNS HPF: ABNORMAL /HPF (ref 0–5)
SODIUM SERPL-SCNC: 142 MMOL/L (ref 136–145)
SP GR UR REFRACTOMETRY: 1.02 (ref 1–1.03)
UA: UC IF INDICATED,UAUC: ABNORMAL
UROBILINOGEN UR QL STRIP.AUTO: 0.2 EU/DL (ref 0.2–1)
WBC # BLD AUTO: 6.7 K/UL (ref 3.6–11)
WBC URNS QL MICRO: ABNORMAL /HPF (ref 0–4)

## 2019-07-11 PROCEDURE — 85025 COMPLETE CBC W/AUTO DIFF WBC: CPT

## 2019-07-11 PROCEDURE — 80053 COMPREHEN METABOLIC PANEL: CPT

## 2019-07-11 PROCEDURE — 81001 URINALYSIS AUTO W/SCOPE: CPT

## 2019-07-11 PROCEDURE — 71046 X-RAY EXAM CHEST 2 VIEWS: CPT

## 2019-07-11 RX ORDER — LANOLIN ALCOHOL/MO/W.PET/CERES
1000 CREAM (GRAM) TOPICAL
COMMUNITY
End: 2019-07-31

## 2019-07-11 RX ORDER — LEVOTHYROXINE SODIUM 100 UG/1
100 TABLET ORAL
COMMUNITY

## 2019-07-12 LAB
BACTERIA SPEC CULT: NORMAL
BACTERIA SPEC CULT: NORMAL
SERVICE CMNT-IMP: NORMAL

## 2019-07-15 ENCOUNTER — TELEPHONE (OUTPATIENT)
Dept: CARDIOLOGY CLINIC | Age: 74
End: 2019-07-15

## 2019-07-15 PROBLEM — R31.9 HEMATURIA: Status: ACTIVE | Noted: 2019-07-15

## 2019-07-15 NOTE — TELEPHONE ENCOUNTER
Patient is scheduled to have a watchman tomorrow. She stated that her throat is sore and she would like to know if she should be concerned or should she still go through with the procedure. Please advise.     Phone #: [phone]  Thanks

## 2019-07-15 NOTE — TELEPHONE ENCOUNTER
Per Dr. Suad Grimes, patient may proceed with Watchman implant as long as she is not running a fever. Spoke with patient. Confirmed she is not running a fever with patient. Will proceed as scheduled.

## 2019-07-16 ENCOUNTER — ANESTHESIA (OUTPATIENT)
Dept: CARDIAC CATH/INVASIVE PROCEDURES | Age: 74
DRG: 274 | End: 2019-07-16
Payer: MEDICARE

## 2019-07-16 ENCOUNTER — ANESTHESIA EVENT (OUTPATIENT)
Dept: CARDIAC CATH/INVASIVE PROCEDURES | Age: 74
DRG: 274 | End: 2019-07-16
Payer: MEDICARE

## 2019-07-16 ENCOUNTER — HOSPITAL ENCOUNTER (INPATIENT)
Age: 74
LOS: 3 days | Discharge: HOME | DRG: 274 | End: 2019-07-19
Attending: INTERNAL MEDICINE | Admitting: INTERNAL MEDICINE
Payer: MEDICARE

## 2019-07-16 ENCOUNTER — APPOINTMENT (OUTPATIENT)
Dept: NON INVASIVE DIAGNOSTICS | Age: 74
DRG: 274 | End: 2019-07-16
Attending: INTERNAL MEDICINE
Payer: MEDICARE

## 2019-07-16 DIAGNOSIS — I48.91 ATRIAL FIBRILLATION, UNSPECIFIED TYPE (HCC): ICD-10-CM

## 2019-07-16 PROBLEM — Z95.818 PRESENCE OF WATCHMAN LEFT ATRIAL APPENDAGE CLOSURE DEVICE: Status: ACTIVE | Noted: 2019-07-16

## 2019-07-16 LAB
ABO + RH BLD: NORMAL
ACT BLD: 131 SECS (ref 79–138)
ACT BLD: 208 SECS (ref 79–138)
ACT BLD: 263 SECS (ref 79–138)
ACT BLD: 274 SECS (ref 79–138)
BLOOD GROUP ANTIBODIES SERPL: NORMAL
INR PPP: 1.1 (ref 0.9–1.1)
PROTHROMBIN TIME: 10.9 SEC (ref 9–11.1)
SPECIMEN EXP DATE BLD: NORMAL

## 2019-07-16 PROCEDURE — 77030038109 HC IMPL LAA WATCHMAN 21MM BSC -L: Performed by: INTERNAL MEDICINE

## 2019-07-16 PROCEDURE — C1894 INTRO/SHEATH, NON-LASER: HCPCS | Performed by: INTERNAL MEDICINE

## 2019-07-16 PROCEDURE — 65660000000 HC RM CCU STEPDOWN

## 2019-07-16 PROCEDURE — 77030019908 HC STETH ESOPH SIMS -A: Performed by: ANESTHESIOLOGY

## 2019-07-16 PROCEDURE — 77030026438 HC STYL ET INTUB CARD -A: Performed by: ANESTHESIOLOGY

## 2019-07-16 PROCEDURE — 74011000250 HC RX REV CODE- 250

## 2019-07-16 PROCEDURE — 36415 COLL VENOUS BLD VENIPUNCTURE: CPT

## 2019-07-16 PROCEDURE — 77030008684 HC TU ET CUF COVD -B: Performed by: ANESTHESIOLOGY

## 2019-07-16 PROCEDURE — 77030020506 HC NDL TRNSPTL NRG BAYL -F: Performed by: INTERNAL MEDICINE

## 2019-07-16 PROCEDURE — 86900 BLOOD TYPING SEROLOGIC ABO: CPT

## 2019-07-16 PROCEDURE — 85347 COAGULATION TIME ACTIVATED: CPT

## 2019-07-16 PROCEDURE — 74011250636 HC RX REV CODE- 250/636: Performed by: INTERNAL MEDICINE

## 2019-07-16 PROCEDURE — 74011250636 HC RX REV CODE- 250/636

## 2019-07-16 PROCEDURE — 93306 TTE W/DOPPLER COMPLETE: CPT

## 2019-07-16 PROCEDURE — 99218 HC RM OBSERVATION: CPT

## 2019-07-16 PROCEDURE — 93312 ECHO TRANSESOPHAGEAL: CPT

## 2019-07-16 PROCEDURE — C1893 INTRO/SHEATH, FIXED,NON-PEEL: HCPCS | Performed by: INTERNAL MEDICINE

## 2019-07-16 PROCEDURE — 77030038110 HC IMPL LAA WATCHMAN 24MM BSC -L: Performed by: INTERNAL MEDICINE

## 2019-07-16 PROCEDURE — 76060000037 HC ANESTHESIA 3 TO 3.5 HR: Performed by: INTERNAL MEDICINE

## 2019-07-16 PROCEDURE — 77030039046 HC PAD DEFIB RADIOTRNSPNT CNMD -B: Performed by: INTERNAL MEDICINE

## 2019-07-16 PROCEDURE — 85610 PROTHROMBIN TIME: CPT

## 2019-07-16 PROCEDURE — C1887 CATHETER, GUIDING: HCPCS | Performed by: INTERNAL MEDICINE

## 2019-07-16 PROCEDURE — C1769 GUIDE WIRE: HCPCS | Performed by: INTERNAL MEDICINE

## 2019-07-16 PROCEDURE — 02L73DK OCCLUSION OF LEFT ATRIAL APPENDAGE WITH INTRALUMINAL DEVICE, PERCUTANEOUS APPROACH: ICD-10-PCS | Performed by: INTERNAL MEDICINE

## 2019-07-16 PROCEDURE — 77030004532 HC CATH ANGI DX IMP BSC -A: Performed by: INTERNAL MEDICINE

## 2019-07-16 PROCEDURE — 33340 PERQ CLSR TCAT L ATR APNDGE: CPT | Performed by: INTERNAL MEDICINE

## 2019-07-16 DEVICE — LEFT ATRIAL APPENDAGE CLOSURE DEVICE WITH DELIVERY SYSTEM
Type: IMPLANTABLE DEVICE | Status: FUNCTIONAL
Brand: WATCHMAN®

## 2019-07-16 RX ORDER — NEOSTIGMINE METHYLSULFATE 1 MG/ML
INJECTION INTRAVENOUS AS NEEDED
Status: DISCONTINUED | OUTPATIENT
Start: 2019-07-16 | End: 2019-07-16 | Stop reason: HOSPADM

## 2019-07-16 RX ORDER — HEPARIN SODIUM 1000 [USP'U]/ML
INJECTION, SOLUTION INTRAVENOUS; SUBCUTANEOUS
Status: DISCONTINUED | OUTPATIENT
Start: 2019-07-16 | End: 2019-07-16

## 2019-07-16 RX ORDER — PROTAMINE SULFATE 10 MG/ML
INJECTION, SOLUTION INTRAVENOUS AS NEEDED
Status: DISCONTINUED | OUTPATIENT
Start: 2019-07-16 | End: 2019-07-16 | Stop reason: HOSPADM

## 2019-07-16 RX ORDER — ACETAMINOPHEN 325 MG/1
650 TABLET ORAL
Status: DISCONTINUED | OUTPATIENT
Start: 2019-07-16 | End: 2019-07-19 | Stop reason: HOSPADM

## 2019-07-16 RX ORDER — SODIUM CHLORIDE 0.9 % (FLUSH) 0.9 %
5-40 SYRINGE (ML) INJECTION AS NEEDED
Status: DISCONTINUED | OUTPATIENT
Start: 2019-07-16 | End: 2019-07-19 | Stop reason: HOSPADM

## 2019-07-16 RX ORDER — SODIUM CHLORIDE 0.9 % (FLUSH) 0.9 %
5-40 SYRINGE (ML) INJECTION EVERY 8 HOURS
Status: DISCONTINUED | OUTPATIENT
Start: 2019-07-16 | End: 2019-07-19 | Stop reason: HOSPADM

## 2019-07-16 RX ORDER — LIDOCAINE HYDROCHLORIDE 10 MG/ML
INJECTION INFILTRATION; PERINEURAL AS NEEDED
Status: DISCONTINUED | OUTPATIENT
Start: 2019-07-16 | End: 2019-07-16 | Stop reason: HOSPADM

## 2019-07-16 RX ORDER — SODIUM CHLORIDE 9 MG/ML
INJECTION, SOLUTION INTRAVENOUS
Status: DISCONTINUED | OUTPATIENT
Start: 2019-07-16 | End: 2019-07-16 | Stop reason: HOSPADM

## 2019-07-16 RX ORDER — AMIODARONE HYDROCHLORIDE 150 MG/3ML
INJECTION, SOLUTION INTRAVENOUS AS NEEDED
Status: DISCONTINUED | OUTPATIENT
Start: 2019-07-16 | End: 2019-07-16 | Stop reason: HOSPADM

## 2019-07-16 RX ORDER — SODIUM CHLORIDE, SODIUM LACTATE, POTASSIUM CHLORIDE, CALCIUM CHLORIDE 600; 310; 30; 20 MG/100ML; MG/100ML; MG/100ML; MG/100ML
INJECTION, SOLUTION INTRAVENOUS
Status: DISCONTINUED | OUTPATIENT
Start: 2019-07-16 | End: 2019-07-16

## 2019-07-16 RX ORDER — ROCURONIUM BROMIDE 10 MG/ML
INJECTION, SOLUTION INTRAVENOUS AS NEEDED
Status: DISCONTINUED | OUTPATIENT
Start: 2019-07-16 | End: 2019-07-16 | Stop reason: HOSPADM

## 2019-07-16 RX ORDER — HYDROCODONE BITARTRATE AND ACETAMINOPHEN 5; 325 MG/1; MG/1
1 TABLET ORAL
Status: DISCONTINUED | OUTPATIENT
Start: 2019-07-16 | End: 2019-07-19 | Stop reason: HOSPADM

## 2019-07-16 RX ORDER — LIDOCAINE HYDROCHLORIDE 20 MG/ML
INJECTION, SOLUTION EPIDURAL; INFILTRATION; INTRACAUDAL; PERINEURAL AS NEEDED
Status: DISCONTINUED | OUTPATIENT
Start: 2019-07-16 | End: 2019-07-16 | Stop reason: HOSPADM

## 2019-07-16 RX ORDER — CEFAZOLIN SODIUM 1 G/3ML
INJECTION, POWDER, FOR SOLUTION INTRAMUSCULAR; INTRAVENOUS AS NEEDED
Status: DISCONTINUED | OUTPATIENT
Start: 2019-07-16 | End: 2019-07-16 | Stop reason: HOSPADM

## 2019-07-16 RX ORDER — GLYCOPYRROLATE 0.2 MG/ML
INJECTION INTRAMUSCULAR; INTRAVENOUS AS NEEDED
Status: DISCONTINUED | OUTPATIENT
Start: 2019-07-16 | End: 2019-07-16 | Stop reason: HOSPADM

## 2019-07-16 RX ORDER — LEVOTHYROXINE SODIUM 75 UG/1
75 TABLET ORAL
Status: DISCONTINUED | OUTPATIENT
Start: 2019-07-17 | End: 2019-07-19 | Stop reason: HOSPADM

## 2019-07-16 RX ORDER — HEPARIN SODIUM 1000 [USP'U]/ML
INJECTION, SOLUTION INTRAVENOUS; SUBCUTANEOUS AS NEEDED
Status: DISCONTINUED | OUTPATIENT
Start: 2019-07-16 | End: 2019-07-16 | Stop reason: HOSPADM

## 2019-07-16 RX ORDER — SUCCINYLCHOLINE CHLORIDE 20 MG/ML
INJECTION INTRAMUSCULAR; INTRAVENOUS AS NEEDED
Status: DISCONTINUED | OUTPATIENT
Start: 2019-07-16 | End: 2019-07-16 | Stop reason: HOSPADM

## 2019-07-16 RX ORDER — PHENYLEPHRINE HCL IN 0.9% NACL 0.4MG/10ML
SYRINGE (ML) INTRAVENOUS AS NEEDED
Status: DISCONTINUED | OUTPATIENT
Start: 2019-07-16 | End: 2019-07-16 | Stop reason: HOSPADM

## 2019-07-16 RX ORDER — WARFARIN SODIUM 5 MG/1
5 TABLET ORAL
Status: COMPLETED | OUTPATIENT
Start: 2019-07-16 | End: 2019-07-17

## 2019-07-16 RX ORDER — ONDANSETRON 2 MG/ML
4 INJECTION INTRAMUSCULAR; INTRAVENOUS
Status: DISCONTINUED | OUTPATIENT
Start: 2019-07-16 | End: 2019-07-19 | Stop reason: HOSPADM

## 2019-07-16 RX ORDER — PROPOFOL 10 MG/ML
INJECTION, EMULSION INTRAVENOUS AS NEEDED
Status: DISCONTINUED | OUTPATIENT
Start: 2019-07-16 | End: 2019-07-16 | Stop reason: HOSPADM

## 2019-07-16 RX ADMIN — SODIUM CHLORIDE: 9 INJECTION, SOLUTION INTRAVENOUS at 16:35

## 2019-07-16 RX ADMIN — Medication 80 MCG: at 17:47

## 2019-07-16 RX ADMIN — AMIODARONE HYDROCHLORIDE 150 MG: 150 INJECTION, SOLUTION INTRAVENOUS at 17:49

## 2019-07-16 RX ADMIN — CEFAZOLIN SODIUM 2 G: 1 INJECTION, POWDER, FOR SOLUTION INTRAMUSCULAR; INTRAVENOUS at 15:45

## 2019-07-16 RX ADMIN — ROCURONIUM BROMIDE 10 MG: 10 INJECTION, SOLUTION INTRAVENOUS at 15:23

## 2019-07-16 RX ADMIN — AMIODARONE HYDROCHLORIDE 1 MG/MIN: 50 INJECTION, SOLUTION INTRAVENOUS at 18:52

## 2019-07-16 RX ADMIN — Medication 80 MCG: at 16:13

## 2019-07-16 RX ADMIN — Medication 80 MCG: at 16:33

## 2019-07-16 RX ADMIN — ROCURONIUM BROMIDE 40 MG: 10 INJECTION, SOLUTION INTRAVENOUS at 15:34

## 2019-07-16 RX ADMIN — Medication 80 MCG: at 17:01

## 2019-07-16 RX ADMIN — SODIUM CHLORIDE: 9 INJECTION, SOLUTION INTRAVENOUS at 15:14

## 2019-07-16 RX ADMIN — HEPARIN SODIUM 7000 UNITS: 1000 INJECTION, SOLUTION INTRAVENOUS; SUBCUTANEOUS at 16:18

## 2019-07-16 RX ADMIN — Medication 80 MCG: at 17:44

## 2019-07-16 RX ADMIN — GLYCOPYRROLATE 0.5 MG: 0.2 INJECTION INTRAMUSCULAR; INTRAVENOUS at 17:35

## 2019-07-16 RX ADMIN — HEPARIN SODIUM 6000 UNITS: 1000 INJECTION, SOLUTION INTRAVENOUS; SUBCUTANEOUS at 16:39

## 2019-07-16 RX ADMIN — PROPOFOL 50 MG: 10 INJECTION, EMULSION INTRAVENOUS at 16:55

## 2019-07-16 RX ADMIN — Medication 80 MCG: at 16:59

## 2019-07-16 RX ADMIN — Medication 40 MCG: at 15:27

## 2019-07-16 RX ADMIN — Medication 80 MCG: at 16:25

## 2019-07-16 RX ADMIN — PROTAMINE SULFATE 40 MG: 10 INJECTION, SOLUTION INTRAVENOUS at 17:32

## 2019-07-16 RX ADMIN — PROPOFOL 150 MG: 10 INJECTION, EMULSION INTRAVENOUS at 15:23

## 2019-07-16 RX ADMIN — Medication 80 MCG: at 16:37

## 2019-07-16 RX ADMIN — SODIUM CHLORIDE: 9 INJECTION, SOLUTION INTRAVENOUS at 15:15

## 2019-07-16 RX ADMIN — ROCURONIUM BROMIDE 10 MG: 10 INJECTION, SOLUTION INTRAVENOUS at 16:54

## 2019-07-16 RX ADMIN — Medication 80 MCG: at 16:21

## 2019-07-16 RX ADMIN — Medication 80 MCG: at 15:50

## 2019-07-16 RX ADMIN — NEOSTIGMINE METHYLSULFATE 3 MG: 1 INJECTION INTRAVENOUS at 17:35

## 2019-07-16 RX ADMIN — SODIUM CHLORIDE: 9 INJECTION, SOLUTION INTRAVENOUS at 15:52

## 2019-07-16 RX ADMIN — Medication 40 MCG: at 15:24

## 2019-07-16 RX ADMIN — Medication 80 MCG: at 17:45

## 2019-07-16 RX ADMIN — SUCCINYLCHOLINE CHLORIDE 120 MG: 20 INJECTION INTRAMUSCULAR; INTRAVENOUS at 15:23

## 2019-07-16 RX ADMIN — LIDOCAINE HYDROCHLORIDE 80 MG: 20 INJECTION, SOLUTION EPIDURAL; INFILTRATION; INTRACAUDAL; PERINEURAL at 15:23

## 2019-07-16 RX ADMIN — Medication 80 MCG: at 17:49

## 2019-07-16 NOTE — ANESTHESIA PREPROCEDURE EVALUATION
Relevant Problems   No relevant active problems       Anesthetic History   No history of anesthetic complications            Review of Systems / Medical History  Patient summary reviewed, nursing notes reviewed and pertinent labs reviewed    Pulmonary  Within defined limits                 Neuro/Psych   Within defined limits      Psychiatric history     Cardiovascular  Within defined limits          Dysrhythmias       Exercise tolerance: <4 METS     GI/Hepatic/Renal  Within defined limits   GERD           Endo/Other  Within defined limits    Hypothyroidism       Other Findings              Physical Exam    Airway  Mallampati: III  TM Distance: 4 - 6 cm  Neck ROM: decreased range of motion   Mouth opening: Normal     Cardiovascular  Regular rate and rhythm,  S1 and S2 normal,  no murmur, click, rub, or gallop  Rhythm: irregular           Dental    Dentition: Caps/crowns     Pulmonary  Breath sounds clear to auscultation               Abdominal  GI exam deferred       Other Findings            Anesthetic Plan    ASA: 3  Anesthesia type: general    Monitoring Plan: Arterial line and MARIA L      Induction: Intravenous  Anesthetic plan and risks discussed with: Patient

## 2019-07-16 NOTE — ANESTHESIA PROCEDURE NOTES
Arterial Line Placement    Start time: 7/16/2019 3:32 PM  End time: 7/16/2019 3:49 PM  Performed by: James Sheehan MD  Authorized by: James Sheehan MD     Pre-Procedure  Indications:  Arterial pressure monitoring and blood sampling  Preanesthetic Checklist: patient identified, risks and benefits discussed, anesthesia consent, site marked, patient being monitored, timeout performed and patient being monitored      Procedure:   Prep:  Chlorhexidine  Seldinger Technique?: Yes    Orientation:  Right  Location:  Radial artery  Catheter size:  20 G  Number of attempts:  1    Assessment:   Post-procedure:  Line secured and sterile dressing applied  Patient Tolerance:  Patient tolerated the procedure well with no immediate complications

## 2019-07-16 NOTE — Clinical Note
Transseptal Cath Performed check box under hemodynamic and Fluoro, [place] 98cm via a guiding sheath. Needle inserted.

## 2019-07-16 NOTE — PROGRESS NOTES
Cardiac Cath Lab Recovery Arrival Note:      Alethea Christensen arrived to Cardiac Cath Lab, Recovery Area. Staff introduced to patient. Patient identifiers verified with NAME and DATE OF BIRTH. Procedure verified with patient. Consent forms reviewed and signed by patient or authorized representative and verified. Allergies verified. Patient and family oriented to department. Patient and family informed of procedure and plan of care. Questions answered with review. Patient prepped for procedure, per orders from physician, prior to arrival.    Patient on cardiac monitor, non-invasive blood pressure, SPO2 monitor. On Room Air. Patient is A&Ox 4. Patient reports No Pain. Patient in stretcher, in low position, with side rails up, call bell within reach, patient instructed to call if assistance as needed. Patient prep in: [address], [place] [address]. Family in: Waiting Room.    Prep by: Archana Bradley RN

## 2019-07-16 NOTE — H&P
HISTORY OF PRESENTING ILLNESS      Shavonne Hartman is a 66 y.o. female with paroxysmal atrial fibrillation status post pulmonary vein isolation in 1 in [place], hypothyroidism and severe anemia.  She was previously on rivaroxaban for CVA risk reduction of her atrial fibrillation however this was discontinued 1 month ago. Crispin Cartagena has undergone multiple iron transfusions for anemia in the past including 3 transfusions last month. Nuclear stress test in 5/2017 demonstrated preserved LV function without ischemia. She underwent holter monitor which demonstrated 20% PVC burden and failed to reveal atrial fibrillation. She was offered Flecainide 50mg BID however she was hesitant to start this. She was planned for Watchman; however, this was postponed. She was recently seen by hematologist who felt that her anemia was not driving her symptoms.  We started a trial of low-dose Toprol which has been ineffective in improving her symptoms.  EKG today shows sinus rhythm.  She has constant fatigue and dizziness. Symptoms were suspected to be non cardiac in etiology and toprol was discontinued.  She denies symptoms r/t AF.          ACTIVE PROBLEM LIST           Patient Active Problem List     Diagnosis Date Noted    Other hyperlipidemia 02/02/2019    Symptomatic anemia 08/23/2017    Hypothyroid 08/23/2017    Cigarette nicotine dependence without complication 34/40/7674    Dental infection 05/20/2017    Calculus of gallbladder with chronic cholecystitis without obstruction 08/15/2016    S/P ablation of atrial fibrillation 08/15/2016    Hashimoto's thyroiditis 08/15/2016    Irritable bowel syndrome with diarrhea 08/15/2016    Paroxysmal A-fib ([address] 09/24/2015             PAST MEDICAL HISTORY           Past Medical History:   Diagnosis Date    Anemia      Atrial fibrillation (HCC)       s/p pulm vein isolation 2015    Atrial fibrillation with rapid ventricular response ([address] 5/20/2017    Depression      GERD (gastroesophageal reflux disease)      Hypothyroidism      Ill-defined condition       bleeding in liver while on Xarelto             PAST SURGICAL HISTORY      Past Surgical History:   Procedure Laterality Date    CARDIAC SURG PROCEDURE UNLIST         cardioversionx4    COLONOSCOPY N/A 9/6/2017     COLONOSCOPY performed by Nereida Phillips MD at [facility] HX AFIB ABLATION   2016    HX CATARACT REMOVAL Bilateral      HX CHOLECYSTECTOMY   08/26/2016    HX ORTHOPAEDIC Bilateral       arthroscopic knee surgery             ALLERGIES      No Known Allergies         FAMILY HISTORY            Family History   Problem Relation Age of Onset    Heart Disease Father      Cancer Paternal Aunt      negative for cardiac disease         SOCIAL HISTORY      Social History               Socioeconomic History    Marital status: SINGLE       Spouse name: Not on file    Number of children: Not on file    Years of education: Not on file    Highest education level: Not on file   Tobacco Use    Smoking status: Current Every Day Smoker       Packs/day: 0.50       Years: 50.00       Pack years: 25.00    Smokeless tobacco: Never Used   Substance and Sexual Activity    Alcohol use: No    Drug use: No    Sexual activity: Not Currently       Partners: Male               MEDICATIONS           Current Outpatient Medications   Medication Sig    DULoxetine (CYMBALTA) 20 mg capsule Take 1 Cap by mouth daily.  topiramate (TOPAMAX) 50 mg tablet Take 1 tab twice a day for migraine prevention    aspirin delayed-release 81 mg tablet Take 81 mg by mouth daily.     levothyroxine (SYNTHROID) 88 mcg tablet Take 88 mcg by mouth Daily (before breakfast).      No current facility-administered medications for this visit.          I have reviewed the nurses notes, vitals, problem list, allergy list, medical history, family, social history and medications.         REVIEW OF SYMPTOMS      General: Pt denies excessive weight gain or loss. Pt is able to conduct ADL's  HEENT: Denies blurred vision, headaches, hearing loss, epistaxis and difficulty swallowing. Respiratory: Denies cough, congestion, shortness of breath, MARY, wheezing or stridor. Cardiovascular: Denies precordial pain, palpitations, edema or PND  Gastrointestinal: Denies poor appetite, indigestion, abdominal pain or blood in stool  Genitourinary: Denies hematuria, dysuria, increased urinary frequency  Musculoskeletal: Denies joint pain or swelling from muscles or joints  Neurologic: Denies tremor, paresthesias, headache, or sensory motor disturbance  Psychiatric: Denies confusion, insomnia, depression  Integumentray: Denies rash, itching or ulcers. Hematologic: Denies easy bruising, bleeding         PHYSICAL EXAMINATION      There were no vitals filed for this visit. General: Well developed, in no acute distress. HEENT: No jaundice, oral mucosa moist, no oral ulcers  Neck: Supple, no stiffness, no lymphadenopathy, supple  Heart:  Normal S1/S2 negative S3 or S4. Regular, no murmur, gallop or rub, no jugular venous distention  Respiratory: Clear bilaterally x 4, no wheezing or rales  Abdomen:   Soft, non-tender, bowel sounds are active.   Extremities:  No edema, normal cap refill, no cyanosis. Musculoskeletal: No clubbing, no deformities  Neuro: A&Ox3, speech clear, gait stable, cooperative, no focal neurologic deficits  Skin: Skin color is normal. No rashes or lesions.  Non diaphoretic, moist.  Vascular: 2+ pulses symmetric in all extremities         DIAGNOSTIC DATA      EKG: sinus rhythm       LABORATORY DATA            Lab Results   Component Value Date/Time     WBC 7.5 01/17/2019 02:53 PM     HGB 12.7 01/17/2019 02:53 PM     HCT 38.6 01/17/2019 02:53 PM     PLATELET 123 59/69/7584 02:53 PM     MCV 94 01/17/2019 02:53 PM            Lab Results   Component Value Date/Time     Sodium 145 (H) 01/17/2019 02:53 PM     Potassium 4.1 01/17/2019 02:53 PM     Chloride 108 (H) 01/17/2019 02:53 PM     CO2 20 01/17/2019 02:53 PM     Anion gap 5 09/24/2018 02:14 PM     Glucose 93 01/17/2019 02:53 PM     BUN 30 (H) 01/17/2019 02:53 PM     Creatinine 1.09 (H) 01/17/2019 02:53 PM     BUN/Creatinine ratio 28 01/17/2019 02:53 PM     GFR est AA 57 (L) 01/17/2019 02:53 PM     GFR est non-AA 50 (L) 01/17/2019 02:53 PM     Calcium 9.7 01/17/2019 02:53 PM     Bilirubin, total <0.2 01/17/2019 02:53 PM     AST (SGOT) 13 01/17/2019 02:53 PM     Alk. phosphatase 80 01/17/2019 02:53 PM     Protein, total 6.5 01/17/2019 02:53 PM     Albumin 4.2 01/17/2019 02:53 PM     Globulin 3.1 08/24/2017 01:50 AM     A-G Ratio 1.8 01/17/2019 02:53 PM     ALT (SGPT) 10 01/17/2019 02:53 PM             ASSESSMENT      1. Atrial fibrillation                                         D. Paroxysmal                        J. PVI 2015                        C. HASBLED 2  2. Severe anemia  3. Hypothyroidism  4. Premature ventricular contractions                        A.  Symptomatic  5.  Diverticulosis        PLAN      WATCHMAN       Tali Rodriguez MD  Cardiac Electrophysiology / Cardiology     [address] [place] [address], [address], [address]  [address]                                         [address] [facility]  [phone] / [phone] Fax                                    [phone] / [phone] Fax

## 2019-07-16 NOTE — PROGRESS NOTES
TRANSFER - IN REPORT:    Verbal report received from [facility] and CRNA on Alethea Christensen  being received from procedure area for routine progression of care. Report consisted of patients Situation, Background, Assessment and Recommendations(SBAR). Information from the following report(s) SBAR, Procedure Summary, MAR, Recent Results and Cardiac Rhythm Afib was reviewed with the receiving clinician. Opportunity for questions and clarification was provided. Assessment completed upon patients arrival to [address] [place] [address] and care assumed. Cardiac Cath Lab Recovery Arrival Note:    Alethea Christensen arrived to [facility] recovery area. Patient procedure= Watchman. Patient on cardiac monitor, non-invasive blood pressure, SPO2 monitor. On  O2 @ 2 lpm via NC.  IV  of NS on pump at 25 ml/hr. Patient status doing well without problems. Patient is A&Ox 4. Patient reports No Pain. PROCEDURE SITE CHECK:    Procedure site:without any bleeding and No Hematoma, No pain/discomfort reported at procedure site. No change in patient status. Continue to monitor patient and status.

## 2019-07-16 NOTE — PROCEDURES
Cardiac Electrophysiology Report      PATIENT INFORMATION        Patient Name: Mere Ramirez MRN: [id number]          Study Date: 2019    YOB: 1945  Age: 66 y.o. Gender: female      Procedure:  Endocardial Left Atrial Appendage OcclusionRa    Referring Physician:  Shefali Pearl NP and Dr. Aakash Montes     Duty Name   Electrophysiologist Rios Tesfaye MD   Monitor Anesthesia Service   Circulator Mitchell Rosa RN; Miki Harrison RN; Nevin Hobson RN; Randee Vaz RN       PATIENT HISTORY     Steffi Downs a 66 y. o. female with paroxysmal atrial fibrillation status post pulmonary vein isolation in  in [place], hypothyroidism and severe anemia. She has undergone multiple iron transfusions for anemia in the past including 3 transfusions recently and as a result her xarelto was discontinued. Nuclear stress test in 2017 demonstrated preserved LV function without ischemia. She underwent holter monitor which demonstrated 20% PVC burden and failed to reveal atrial fibrillation. She was offered Flecainide 50mg BID however she was hesitant to start this. She now presents for left atrial appendage occlusion. PROCEDURE     The patient was brought to the Cardiac Electrophysiology laboratory in a post-absorptive, fasting state. Informed consent was obtained. A peripheral IV was in place. Continuous electrocardiographic, blood pressure, O2 saturation and  CO2 monitoring was initiated. Self-adhesive cardioversion patches were positioned on the chest. 500 cc of normal saline was administered intravenously. General anesthesia was effectuated by the anesthesia service. Intraoperative transesophageal imaging was performed by the anesthesia service. There was no evidence of visualized thrombus in the left atrial appendage. Measurements of the JOSE ostium width and JOSE depth were performed. The patient was then prepped and draped in the usual sterile fashion.  Both groins were infiltrated with a 50/50 mixture of Lidocaine (1%) and bupivicaine (0.5%). Vascular access was obtained and an SL1 sheath and an 8F sheath were placed in the right common femoral vein using the modified Seldinger technique. Through the SL1 sheath, a 0.032, 145-cm Smita Materialiseey wire was advanced to the level of the superior vena cava. After the guidewire was withdrawn, a Luisa transseptal needle was introduced into the sheath. The needle/sheath assembly was withdrawn under fluoroscopic and intraoperative MARIA L guidance until the tip of the sheath prolapsed into the fossa ovalis. Appropriate positioning was confirmed with multiple fluoroscopic views and MARIA L imaging. Transseptal access was obtained following delivery of RF energy with the [facility] needle. Systemic heparinization was initiated and the sheath was flushed continuously with heparinized saline. Anticoagulation status was monitored with frequent ACT measurements. Heparin was given in interrupted doses to maintain an ACT > 300 sec. The dilator was advanced over the wire, followed by the sheath. Mean left arterial pressure was measured and was found to be 22 mm Hg. The 0.032 wire was then exchanged for the [facility] wire which was positioned into the left superior pulmonary vein. The dilator was then removed over the wire. A Zenamins double curve access sheath was then advanced over the wire into the left atrium. A 6F pigtail was then advanced over the wire and was then positioned into the left atrial appendage. Arteriogram of the left atrial appendage was performed. The pigtail was then removed. The WATCHMAN device delivery system was then advanced to the tip of the access sheath. The WATCHMAN device was then deployed. Device release criteria were met and the device was deployed. The access sheath/device delivery system was then withdrawn to the right atrium. There was no pericardial effusion noted at the conclusion of the procedure.  Following a test dose, protamine 40 mg was administered and once the ACT was found to be < 180 seconds, all catheters and sheaths were removed and hemostasis obtained by direct manual compression. The patient was extubated, hemodynamically stable, tolerated the procedure well and was transferred in stable condition. There were no immediate complications encountered during the procedure. There was minimal blood loss and no specimen were removed. WATCHMAN DEVICE DATA      Size Lot #   Wanderio 24 mm [id number]       FINDINGS     1. The baseline ECG revealed sinus rhythm  2. Endocardial left atrial appendage occlusion was performed utilizing a 24 mm WATCHMAN device. 3. All pass criteria for position, anchor, size and seal were met (positive tug test, no evidence of para-device leak by color flow Doppler ultrasound imaging on MARIA L, no evidence of para-device flow leak on arteriogram on fluoroscopy and there was > 8% compression of the device on MARIA L). 4. No evidence of early pericardial effusion on MARIA L post-device deployment. RADIOLOGY SUMMARY     Total    Fluoro Time (minutes)  16    Dose Area Product (mGy)  1976        CONCLUSIONS      1. Successful left atrial appendage occlusion utilizing WATCHMAN device. 2. Monitor on telemetry overnight. 3. Start coumadin 5 mg oral daily x 45 days. Follow up in coumadin clinic. 4. Limited echocardiogram tomorrow to evaluate for late pericardial effusion. 5. Resume all other home medications  6. Follow up in 2 weeks in EP clinic with  [facility]   7. Repeat MARIA L in 45 days. 8. Follow up with Shefali Pearl NP and Dr. Dinorah Bauer as scheduled. Thank you, Shefali Pearl NP and Dr. Dinorah Bauer for allowing me to participate in the care of this extraordinarily pleasant female.        Rios Tesfaye MD  Cardiac Electrophysiology / Cardiology    [address] [place] [address] [place], [address]  [place], [address]                [address] [facility], [address] [place] [address]  [phone] / [phone] Fax   [phone] / [phone] Fax

## 2019-07-16 NOTE — PROGRESS NOTES
TRANSFER - OUT REPORT:    Verbal report given to [facility] on Paulie Cuevas being transferred to Room 468 for routine progression of care       Report consisted of patients Situation, Background, Assessment and   Recommendations(SBAR). Information from the following report(s) SBAR, Procedure Summary, MAR, Recent Results and Cardiac Rhythm Afib was reviewed with the receiving nurse. Opportunity for questions and clarification was provided.

## 2019-07-16 NOTE — Clinical Note
Device catheter removed. Delivery catheter removed, LAAO device deployed. Placement of LAAO device was verified.

## 2019-07-17 ENCOUNTER — APPOINTMENT (OUTPATIENT)
Dept: NON INVASIVE DIAGNOSTICS | Age: 74
DRG: 274 | End: 2019-07-17
Attending: NURSE PRACTITIONER
Payer: MEDICARE

## 2019-07-17 ENCOUNTER — TELEPHONE (OUTPATIENT)
Dept: CARDIOLOGY CLINIC | Age: 74
End: 2019-07-17

## 2019-07-17 LAB
COMMENT, HOLDF: NORMAL
INR PPP: 1.1 (ref 0.9–1.1)
PROTHROMBIN TIME: 10.8 SEC (ref 9–11.1)
SAMPLES BEING HELD,HOLD: NORMAL

## 2019-07-17 PROCEDURE — 85610 PROTHROMBIN TIME: CPT

## 2019-07-17 PROCEDURE — 93308 TTE F-UP OR LMTD: CPT

## 2019-07-17 PROCEDURE — 36415 COLL VENOUS BLD VENIPUNCTURE: CPT

## 2019-07-17 PROCEDURE — 74011250637 HC RX REV CODE- 250/637

## 2019-07-17 PROCEDURE — 65660000000 HC RM CCU STEPDOWN

## 2019-07-17 PROCEDURE — 74011250637 HC RX REV CODE- 250/637: Performed by: INTERNAL MEDICINE

## 2019-07-17 PROCEDURE — 74011250637 HC RX REV CODE- 250/637: Performed by: NURSE PRACTITIONER

## 2019-07-17 RX ORDER — DILTIAZEM HYDROCHLORIDE 120 MG/1
120 CAPSULE, COATED, EXTENDED RELEASE ORAL DAILY
Status: DISCONTINUED | OUTPATIENT
Start: 2019-07-17 | End: 2019-07-18

## 2019-07-17 RX ORDER — AMIODARONE HYDROCHLORIDE 200 MG/1
400 TABLET ORAL 2 TIMES DAILY
Status: DISCONTINUED | OUTPATIENT
Start: 2019-07-17 | End: 2019-07-19

## 2019-07-17 RX ORDER — WARFARIN SODIUM 5 MG/1
5 TABLET ORAL ONCE
Status: COMPLETED | OUTPATIENT
Start: 2019-07-17 | End: 2019-07-17

## 2019-07-17 RX ADMIN — Medication 10 ML: at 00:12

## 2019-07-17 RX ADMIN — LEVOTHYROXINE SODIUM 75 MCG: 75 TABLET ORAL at 07:16

## 2019-07-17 RX ADMIN — AMIODARONE HYDROCHLORIDE 400 MG: 200 TABLET ORAL at 11:26

## 2019-07-17 RX ADMIN — WARFARIN SODIUM 5 MG: 5 TABLET ORAL at 17:26

## 2019-07-17 RX ADMIN — BENZOCAINE AND MENTHOL 1 LOZENGE: 15; 3.6 LOZENGE ORAL at 05:00

## 2019-07-17 RX ADMIN — DILTIAZEM HYDROCHLORIDE 120 MG: 120 CAPSULE, COATED, EXTENDED RELEASE ORAL at 11:26

## 2019-07-17 RX ADMIN — WARFARIN SODIUM 5 MG: 5 TABLET ORAL at 00:11

## 2019-07-17 RX ADMIN — BENZOCAINE AND MENTHOL 1 LOZENGE: 15; 3.6 LOZENGE ORAL at 04:23

## 2019-07-17 RX ADMIN — AMIODARONE HYDROCHLORIDE 400 MG: 200 TABLET ORAL at 21:59

## 2019-07-17 RX ADMIN — Medication 10 ML: at 07:16

## 2019-07-17 RX ADMIN — Medication 10 ML: at 21:59

## 2019-07-17 NOTE — PROGRESS NOTES
Reason for Admission:  Patient admitted for Atrial fibrillation presence of Watchman device                    RRAT Score: 12                 Do you (patient/family) have any concerns for transition/discharge? The patient denied having any concerns for transition home upon discharge                Plan for utilizing home health: None, patient denied any current home health services- no home health needs identified at this time       Current Advanced Directive/Advance Care Plan:  Not on File            Transition of Care Plan:  Home    The CM met with the patient at bedside in order to introduce the role of CM and assess for patient needs. The patient lives at home with her daughter and two grandsons. The patient endorses being independent with ADLs and mobility prior to hospitalization, denied use of DME in the home. The patient drives herself to and from MD appointments. The patient denied difficulty accessing medications prior to hospitalization, and denied any current home health services in place. The patient endorses that her daughter will transport home upon discharge. The CM will continue to follow for transitions of care. Alex Ray, MSW    Care Management Interventions  PCP Verified by CM: Yes(Patient verified PCP as NP Ileana Yu )  Mode of Transport at Discharge:  Other (see comment)(Daughter will transport home )  Transition of Care Consult (CM Consult): Discharge Planning  MyChart Signup: No  Discharge Durable Medical Equipment: No  Health Maintenance Reviewed: Yes  Physical Therapy Consult: No  Occupational Therapy Consult: No  Speech Therapy Consult: No  Current Support Network: Relative's Home, Lives with Caregiver(Patient lives with her daughter and two grandsons )  Confirm Follow Up Transport: Family  Plan discussed with Pt/Family/Caregiver: Yes  Discharge Location  Discharge Placement: Home

## 2019-07-17 NOTE — PROGRESS NOTES
Pharmacist Note - Warfarin Dosing  Consult provided for this 66 y. o.female to manage warfarin for Atrial Fibrillation    INR Goal: 2 - 3    Home regimen/ tablet size: new start    Drugs that may increase INR: None  Drugs that may decrease INR: None  Other current anticoagulants/ drugs that may increase bleeding risk: None  Risk factors: Age > 65  Daily INR ordered: YES    Recent Labs     07/16/19  2122   INR 1.1     Date               INR                  Dose  7/16  1.1  5                                                                                Assessment/ Plan: Will order warfarin 5 mg PO x 1 dose. Pharmacy will continue to monitor daily and adjust therapy as indicated. Please contact the pharmacist at  for outpatient recommendations if needed.

## 2019-07-17 NOTE — PROGRESS NOTES
Cardiology Progress Note            Admit Date: 7/16/2019  Admit Diagnosis: Atrial fibrillation, unspecified type ([facility] [I48.91]  Presence of Watchman left atrial appendage closure device [Z95.818]  Atrial fibrillation ([facility] [address] [I48.91]  Date: 7/17/2019     Time: 2:02 PM    HPI: Jessica lewis [id number]. o. female with paroxysmal atrial fibrillation status post pulmonary vein isolation in 2015 in [place], hypothyroidism and severe anemia. She has undergone multiple iron transfusions for anemia in the past including 3 transfusions recently and as a result her xarelto was discontinued. Nuclear stress test in 5/2017 demonstrated preserved LV function without ischemia. She underwent holter monitor which demonstrated 20% PVC burden and failed to reveal atrial fibrillation. She was offered Flecainide 50mg BID however she was hesitant to start this. Had LA appendage occlusion with WATCHMAN device on 7/16/19. Monitor overnight. Had afib with RVR overnight. Was started on amiodarone at [facility] last evening. This a.m., pt is in Afib with intermittent RVR, rate increases with activity. Subjective:  Denies chest pain, SOB, palpitations currently. Reports some fatigue. Assessment and Plan     1. Afib with intermittent RVR:   -Transition to po amiodarone 400 mg BID   -Add diltiazem  mg daily   -Continue Warfarin    2. S/p LA appendage occlusion with WATCHMAN device:   -Plan to continue Warfarin for 45 day. Check INR on Friday. -TTE today with trivial pericardial effusion   -Plan repeat MARIA L in 45 days. 3. Hx of anemia:hgb 11.2 on 7/11/19    Pt is s/p successful LA appendage occlusion. Had afib with intermittent RVR overnight and this a.m. Discussed with Dr. Suad Grimes. Dr. Suad Grimes also reviewed echo result.  Will transition from IV amio to po amio and add diltiazem CD.      ]  Cardiac testing hx:  07/16/19   ECHO ADULT FOLLOW-UP OR LIMITED 07/17/2019 7/17/2019    Narrative · Pericardium: Trivial pericardial effusion. There is a small left pleural   effusion. · Left Ventricle: Low normal systolic dysfunction. Estimated left   ventricular ejection fraction is 51 - 55%.         Signed by: Mario Wallace MD       [facility]  Past Medical History:   Diagnosis Date    Anemia     Atrial fibrillation ([facility] [address]     s/p pulm vein isolation 2015    Atrial fibrillation with rapid ventricular response ([facility] [address] 5/20/2017    Basal cell carcinoma     Depression     GERD (gastroesophageal reflux disease)     Hematuria 7/15/2019    Hypothyroidism     Ill-defined condition     bleeding in liver while on Xarelto    Tremor       Social Hx  Social History     Socioeconomic History    Marital status: SINGLE     Spouse name: Not on file    Number of children: Not on file    Years of education: Not on file    Highest education level: Not on file   Occupational History    Not on file   Social Needs    Financial resource strain: Not on file    Food insecurity:     Worry: Not on file     Inability: Not on file    Transportation needs:     Medical: Not on file     Non-medical: Not on file   Tobacco Use    Smoking status: Current Every Day Smoker     Packs/day: 0.50     Years: 50.00     Pack years: 25.00    Smokeless tobacco: Never Used   Substance and Sexual Activity    Alcohol use: Yes     Frequency: Never     Comment: OCCA    Drug use: No    Sexual activity: Not Currently     Partners: Male   Lifestyle    Physical activity:     Days per week: Not on file     Minutes per session: Not on file    Stress: Not on file   Relationships    Social connections:     Talks on phone: Not on file     Gets together: Not on file     Attends Voodoo service: Not on file     Active member of club or organization: Not on file     Attends meetings of clubs or organizations: Not on file     Relationship status: Not on file    Intimate partner violence:     Fear of current or ex partner: Not on file     Emotionally abused: Not on file     Physically abused: Not on file     Forced sexual activity: Not on file   Other Topics Concern    Not on file   Social History Narrative    Not on file       Objective:      Physical Exam:                Visit Vitals  /56 (BP 1 Location: Left arm, BP Patient Position: At rest)   Pulse (!) 107   Temp 98.4 °F (36.9 °C)   Resp 16   Ht 5' 4\" (1.626 m)   Wt 145 lb (65.8 kg)   SpO2 98%   Breastfeeding? No   BMI 24.89 kg/m²          General Appearance:   Well developed, well nourished,alert and oriented x 3, and   individual in no acute distress. Ears/Nose/Mouth/Throat:    Hearing grossly normal.         Neck:  Supple. Chest:    Lungs clear to auscultation bilaterally. Cardiovascular:   Irregularly irregualr rate and rhythm, S1, S2 normal, no murmur. Abdomen:    Soft, non-tender, bowel sounds are present. Extremities:  No edema bilaterally. Skin:  Warm and dry.      Telemetry: afib with intermittent RVR          Data Review:    Labs:    Recent Results (from the past 24 hour(s))   POC ACTIVATED CLOTTING TIME    Collection Time: 07/16/19  4:35 PM   Result Value Ref Range    Activated Clotting Time (POC) 208 (H) 79 - 138 SECS   POC ACTIVATED CLOTTING TIME    Collection Time: 07/16/19  4:57 PM   Result Value Ref Range    Activated Clotting Time (POC) 274 (H) 79 - 138 SECS   POC ACTIVATED CLOTTING TIME    Collection Time: 07/16/19  5:28 PM   Result Value Ref Range    Activated Clotting Time (POC) 263 (H) 79 - 138 SECS   POC ACTIVATED CLOTTING TIME    Collection Time: 07/16/19  6:09 PM   Result Value Ref Range    Activated Clotting Time (POC) 131 79 - 138 SECS   PROTHROMBIN TIME + INR    Collection Time: 07/16/19  9:22 PM   Result Value Ref Range    INR 1.1 0.9 - 1.1      Prothrombin time 10.9 9.0 - 11.1 sec   PROTHROMBIN TIME + INR    Collection Time: 07/17/19  4:20 AM   Result Value Ref Range    INR 1.1 0.9 - 1.1      Prothrombin time 10.8 9.0 - 11.1 sec SAMPLES BEING HELD    Collection Time: 07/17/19  4:20 AM   Result Value Ref Range    SAMPLES BEING HELD 1PST     COMMENT        Add-on orders for these samples will be processed based on acceptable specimen integrity and analyte stability, which may vary by analyte. Radiology:        Current Facility-Administered Medications   Medication Dose Route Frequency    benzocaine-menthol (CEPACOL) lozenge 1 Lozenge  1 Lozenge Mucous Membrane PRN    warfarin (COUMADIN) tablet 5 mg  5 mg Oral ONCE    amiodarone (CORDARONE) tablet 400 mg  400 mg Oral BID    dilTIAZem CD (CARDIZEM CD) capsule 120 mg  120 mg Oral DAILY    sodium chloride (NS) flush 5-40 mL  5-40 mL IntraVENous Q8H    sodium chloride (NS) flush 5-40 mL  5-40 mL IntraVENous PRN    acetaminophen (TYLENOL) tablet 650 mg  650 mg Oral Q4H PRN    HYDROcodone-acetaminophen (NORCO) 5-325 mg per tablet 1 Tab  1 Tab Oral Q4H PRN    ondansetron (ZOFRAN) injection 4 mg  4 mg IntraVENous Q4H PRN    levothyroxine (SYNTHROID) tablet 75 mcg  75 mcg Oral ACB    Warfarin - pharmacy to dose   Other Rx Dosing/Monitoring          Sheila [place].  LORIN Pulliam     [facility] [address] [place] [address], [address] [place] [address] [address]   Sincere Sanches   [phone]

## 2019-07-17 NOTE — PROGRESS NOTES
Pharmacist Note - Warfarin Dosing  Consult provided for this 66 y. o.female to manage warfarin for Atrial Fibrillation    INR Goal: 2 - 3    Home regimen/ tablet size: new start    Drugs that may increase INR: None  Drugs that may decrease INR: None  Other current anticoagulants/ drugs that may increase bleeding risk: None  Risk factors: Age > 65  Daily INR ordered: YES    Recent Labs     07/17/19  0420 07/16/19  2122   INR 1.1 1.1     Date               INR                  Dose  7/16  1.1  5 mg  7/17                1.1                   5 mg                                                                             Assessment/ Plan: Will order warfarin 5 mg PO x 1 dose. Pharmacy will continue to monitor daily and adjust therapy as indicated. Please contact the pharmacist at  for outpatient recommendations if needed.

## 2019-07-17 NOTE — PROGRESS NOTES
Bedside and Verbal shift change report given to Fidencio Stauffer RN   (oncoming nurse) by Morgan Castañeda RN (offgoing nurse). Report included the following information SBAR, Kardex, ED Summary, OR Summary, Procedure Summary, Intake/Output, MAR, Accordion and Cardiac Rhythm AFIB.

## 2019-07-17 NOTE — PROGRESS NOTES
Off bedrest 2330, Cervantes d/c and voided several times thereafter  Amiodarone titrated but remains due to poor rate control.  -150's with activity

## 2019-07-17 NOTE — TELEPHONE ENCOUNTER
----- Message from Kevon Mitchell MD sent at 7/16/2019  5:45 PM EDT -----  Left atrial appendage occlusion performed (WATCHMAN) at [address] [facility] (CPT [address])      Needs MARIA L 45 days    Clinic visit 2 weeks

## 2019-07-18 ENCOUNTER — APPOINTMENT (OUTPATIENT)
Dept: NON INVASIVE DIAGNOSTICS | Age: 74
DRG: 274 | End: 2019-07-18
Attending: NURSE PRACTITIONER
Payer: MEDICARE

## 2019-07-18 LAB
ANION GAP SERPL CALC-SCNC: 6 MMOL/L (ref 5–15)
ATRIAL RATE: 76 BPM
BUN SERPL-MCNC: 16 MG/DL (ref 6–20)
BUN/CREAT SERPL: 17 (ref 12–20)
CALCIUM SERPL-MCNC: 8.7 MG/DL (ref 8.5–10.1)
CALCULATED R AXIS, ECG10: 11 DEGREES
CALCULATED T AXIS, ECG11: 6 DEGREES
CHLORIDE SERPL-SCNC: 109 MMOL/L (ref 97–108)
CO2 SERPL-SCNC: 25 MMOL/L (ref 21–32)
CREAT SERPL-MCNC: 0.93 MG/DL (ref 0.55–1.02)
DIAGNOSIS, 93000: NORMAL
ERYTHROCYTE [DISTWIDTH] IN BLOOD BY AUTOMATED COUNT: 16.5 % (ref 11.5–14.5)
GLUCOSE SERPL-MCNC: 100 MG/DL (ref 65–100)
HCT VFR BLD AUTO: 36 % (ref 35–47)
HGB BLD-MCNC: 11.2 G/DL (ref 11.5–16)
INR PPP: 1.8 (ref 0.9–1.1)
MAGNESIUM SERPL-MCNC: 2 MG/DL (ref 1.6–2.4)
MCH RBC QN AUTO: 28 PG (ref 26–34)
MCHC RBC AUTO-ENTMCNC: 31.1 G/DL (ref 30–36.5)
MCV RBC AUTO: 90 FL (ref 80–99)
NRBC # BLD: 0 K/UL (ref 0–0.01)
NRBC BLD-RTO: 0 PER 100 WBC
PLATELET # BLD AUTO: 246 K/UL (ref 150–400)
PMV BLD AUTO: 10.5 FL (ref 8.9–12.9)
POTASSIUM SERPL-SCNC: 3.8 MMOL/L (ref 3.5–5.1)
PROTHROMBIN TIME: 17.4 SEC (ref 9–11.1)
Q-T INTERVAL, ECG07: 356 MS
QRS DURATION, ECG06: 78 MS
QTC CALCULATION (BEZET), ECG08: 454 MS
RBC # BLD AUTO: 4 M/UL (ref 3.8–5.2)
SODIUM SERPL-SCNC: 140 MMOL/L (ref 136–145)
VENTRICULAR RATE, ECG03: 98 BPM
WBC # BLD AUTO: 7.6 K/UL (ref 3.6–11)

## 2019-07-18 PROCEDURE — 83735 ASSAY OF MAGNESIUM: CPT

## 2019-07-18 PROCEDURE — 74011250637 HC RX REV CODE- 250/637: Performed by: INTERNAL MEDICINE

## 2019-07-18 PROCEDURE — 36415 COLL VENOUS BLD VENIPUNCTURE: CPT

## 2019-07-18 PROCEDURE — 85027 COMPLETE CBC AUTOMATED: CPT

## 2019-07-18 PROCEDURE — 74011250637 HC RX REV CODE- 250/637: Performed by: NURSE PRACTITIONER

## 2019-07-18 PROCEDURE — 93005 ELECTROCARDIOGRAM TRACING: CPT

## 2019-07-18 PROCEDURE — 85610 PROTHROMBIN TIME: CPT

## 2019-07-18 PROCEDURE — 93308 TTE F-UP OR LMTD: CPT

## 2019-07-18 PROCEDURE — 80048 BASIC METABOLIC PNL TOTAL CA: CPT

## 2019-07-18 PROCEDURE — 65660000000 HC RM CCU STEPDOWN

## 2019-07-18 RX ORDER — WARFARIN SODIUM 5 MG/1
5 TABLET ORAL ONCE
Status: COMPLETED | OUTPATIENT
Start: 2019-07-18 | End: 2019-07-18

## 2019-07-18 RX ORDER — DILTIAZEM HYDROCHLORIDE 240 MG/1
240 CAPSULE, COATED, EXTENDED RELEASE ORAL DAILY
Status: DISCONTINUED | OUTPATIENT
Start: 2019-07-18 | End: 2019-07-19 | Stop reason: HOSPADM

## 2019-07-18 RX ORDER — WARFARIN SODIUM 5 MG/1
5 TABLET ORAL ONCE
Status: DISCONTINUED | OUTPATIENT
Start: 2019-07-18 | End: 2019-07-18 | Stop reason: SDUPTHER

## 2019-07-18 RX ADMIN — LEVOTHYROXINE SODIUM 75 MCG: 75 TABLET ORAL at 07:36

## 2019-07-18 RX ADMIN — WARFARIN SODIUM 5 MG: 5 TABLET ORAL at 17:39

## 2019-07-18 RX ADMIN — DILTIAZEM HYDROCHLORIDE 240 MG: 240 CAPSULE, COATED, EXTENDED RELEASE ORAL at 08:43

## 2019-07-18 RX ADMIN — Medication 10 ML: at 07:41

## 2019-07-18 RX ADMIN — AMIODARONE HYDROCHLORIDE 400 MG: 200 TABLET ORAL at 20:49

## 2019-07-18 RX ADMIN — AMIODARONE HYDROCHLORIDE 400 MG: 200 TABLET ORAL at 08:43

## 2019-07-18 RX ADMIN — IBUPROFEN 600 MG: 400 TABLET ORAL at 08:43

## 2019-07-18 NOTE — ANESTHESIA POSTPROCEDURE EVALUATION
Procedure(s):  WATCHMAN JOSE CLOSURE DEVICE. general    Anesthesia Post Evaluation        Patient location during evaluation: PACU  Note status: Adequate. Level of consciousness: responsive to verbal stimuli and sleepy but conscious  Pain management: satisfactory to patient  Airway patency: patent  Anesthetic complications: no  Cardiovascular status: acceptable  Respiratory status: acceptable  Hydration status: acceptable  Comments: +Post-Anesthesia Evaluation and Assessment    Patient: Dago Gipson MRN: [id number]  SSN: [id number]   YOB: 1945  Age: 66 y.o. Sex: female          Cardiovascular Function/Vital Signs    /58   Pulse 83   Temp 37.3 °C (99.1 °F)   Resp 18   Ht 5' 4\" (1.626 m)   Wt 66 kg (145 lb 8.1 oz)   SpO2 97%   Breastfeeding? No   BMI 24.98 kg/m²     Patient is status post Procedure(s):  WATCHMAN JOSE CLOSURE DEVICE. Nausea/Vomiting: Controlled. Postoperative hydration reviewed and adequate. Pain:  Pain Scale 1: Numeric (0 - 10) (07/18/19 1505)  Pain Intensity 1: 0 (07/18/19 1505)   Managed. Neurological Status: At baseline. Mental Status and Level of Consciousness: Arousable. Pulmonary Status:   O2 Device: Room air (07/18/19 1505)   Adequate oxygenation and airway patent. Complications related to anesthesia: None    Post-anesthesia assessment completed. No concerns. I have evaluated the patient and the patient is stable and ready to be discharged from PACU . Signed By: Scottie Grove MD    7/18/2019        Vitals Value Taken Time   BP     Temp     Pulse 86 7/18/2019  6:54 PM   Resp     SpO2     Vitals shown include unvalidated device data.

## 2019-07-18 NOTE — PROGRESS NOTES
Pharmacist Note - Warfarin Dosing  Consult provided for this 66 y. o.female to manage warfarin for Atrial Fibrillation, s/p LA appendage occlusion ligation    INR Goal: 2 - 3    Home regimen/ tablet size: new start (Day 3) - Previously on Xarelto -> severe anemia    Drugs that may increase INR: Amiodarone  Drugs that may decrease INR: None  Other current anticoagulants/ drugs that may increase bleeding risk: None  Risk factors: Age > 65  Daily INR ordered: YES    Recent Labs     07/18/19  0420 07/18/19  0414 07/17/19  0420 07/16/19  2122   HGB  --  11.2*  --   --    INR 1.8*  --  1.1 1.1     Date               INR                  Dose  7/16  1.1  5 mg  7/17                1.1                   5 mg  7/18                 1.8                  5 mg                                                                             Assessment/ Plan: Will order warfarin 5 mg PO x 1 dose. Pharmacy will continue to monitor daily and adjust therapy as indicated. Please contact the pharmacist at  for outpatient recommendations if needed.

## 2019-07-18 NOTE — PROGRESS NOTES
0730: Bedside shift change report given to Lisa Richard (oncoming nurse) by Shanna Lamas (offgoing nurse). Report included the following information SBAR and Kardex. afib    1800: patient ambulated in hallway. HR only reached 109 at the most.       1930: Bedside shift change report given to Shanna Lamas (oncoming nurse) by Michelle Mirza RN (offgoing nurse). Report included the following information SBAR, Kardex and Cardiac Rhythm afib.

## 2019-07-18 NOTE — PROGRESS NOTES
Pt reports that chest discomfort this a.m was relieved after dose of motrin. Nursing reports pt ambulated this afternoon and HR was improved to max rate of 109.

## 2019-07-18 NOTE — PROGRESS NOTES
Patient wishes to continue smoking cessation- abstains from nicotine patch while hospitalized. Rate remains poorly controlled with slight improvement from yesterday.

## 2019-07-18 NOTE — PROGRESS NOTES
Cardiology Progress Note            Admit Date: 7/16/2019  Admit Diagnosis: Atrial fibrillation, unspecified type ([facility] [address] [I48.91]  Presence of Watchman left atrial appendage closure device [Z95.818]  Atrial fibrillation ([facility] [address] [I48.91]  Date: 7/18/2019     Time: 2:02 PM    HPI: Piyush lewis [id number]. o. female with paroxysmal atrial fibrillation status post pulmonary vein isolation in 2015 in [place], hypothyroidism and severe anemia. She has undergone multiple iron transfusions for anemia in the past including 3 transfusions recently and as a result her xarelto was discontinued. Nuclear stress test in 5/2017 demonstrated preserved LV function without ischemia. She underwent holter monitor which demonstrated 20% PVC burden and failed to reveal atrial fibrillation. She was offered Flecainide 50mg BID however she was hesitant to start this. Had LA appendage occlusion with WATCHMAN device on 7/16/19. Monitor overnight. Had afib with RVR overnight. Was started on amiodarone at [facility] last evening. This a.m., pt is in Afib with intermittent RVR, rate increases with activity. Subjective:  Reports she has been experiencing pain across chest and upper back which feels like a \"sticking pain, like porcupines\". Has had since yesterday. Pain is worse with deep breathing. States pain is 5/10. Reports palpitations when HR elevated. Some mild SOB. Denies dizziness, lightheadeness. HR has been fluctuating in 80's-108 at rest and increases up to 130's-140's at rest     Assessment and Plan     1. Afib with intermittent RVR:   ?aflutter waves in V lead - will check 12 lead EKG   -Continue PO  amiodarone 400 mg BID   -Increase Diltiazem CD to 240 mg daily- start this a.m.   -Continue Warfarin- INR 1.8 today. Recheck INR in a.m. (pharmacy to dose is ordered)    2. S/p LA appendage occlusion with WATCHMAN device:   -Plan to continue Warfarin for 45 day. Check INR on Friday. -TTE today with trivial pericardial effusion   -Plan repeat MARI AL in 45 days. 3. Hx of anemia:   -hgb stable today at 11.2     4. Chest pain with pleuritic features    -repeat limited echo      Pt is s/p successful LA appendage occlusion. Still with afib with intermittent RVR overnight and this a.m.,worse with activity. Now says she has been having continous chest discomfort described as a \"porcupine\"sensation- will repeat limited echo and give motrin x 1 dose. Discussed above with Dr. Ronaldo Hopkins- will increase diltiazem cd. Cardiac testing hx:  07/16/19   ECHO ADULT FOLLOW-UP OR LIMITED 07/17/2019 7/17/2019    Narrative · Pericardium: Trivial pericardial effusion. There is a small left pleural   effusion. · Left Ventricle: Low normal systolic dysfunction. Estimated left   ventricular ejection fraction is 51 - 55%.         Signed by: Anais Landaverde MD       [facility]  Past Medical History:   Diagnosis Date    Anemia     Atrial fibrillation ([address]     s/p pulm vein isolation 2015    Atrial fibrillation with rapid ventricular response ([address] 5/20/2017    Basal cell carcinoma     Depression     GERD (gastroesophageal reflux disease)     Hematuria 7/15/2019    Hypothyroidism     Ill-defined condition     bleeding in liver while on Xarelto    Tremor       Social Hx  Social History     Socioeconomic History    Marital status: SINGLE     Spouse name: Not on file    Number of children: Not on file    Years of education: Not on file    Highest education level: Not on file   Occupational History    Not on file   Social Needs    Financial resource strain: Not on file    Food insecurity:     Worry: Not on file     Inability: Not on file    Transportation needs:     Medical: Not on file     Non-medical: Not on file   Tobacco Use    Smoking status: Current Every Day Smoker     Packs/day: 0.50     Years: 50.00     Pack years: 25.00    Smokeless tobacco: Never Used   Substance and Sexual Activity  Alcohol use: Yes     Frequency: Never     Comment: OCCA    Drug use: No    Sexual activity: Not Currently     Partners: Male   Lifestyle    Physical activity:     Days per week: Not on file     Minutes per session: Not on file    Stress: Not on file   Relationships    Social connections:     Talks on phone: Not on file     Gets together: Not on file     Attends Buddhist service: Not on file     Active member of club or organization: Not on file     Attends meetings of clubs or organizations: Not on file     Relationship status: Not on file    Intimate partner violence:     Fear of current or ex partner: Not on file     Emotionally abused: Not on file     Physically abused: Not on file     Forced sexual activity: Not on file   Other Topics Concern    Not on file   Social History Narrative    Not on file       Objective:      Physical Exam:                Visit Vitals  /65 (BP 1 Location: Left arm, BP Patient Position: At rest)   Pulse (!) 106   Temp 98.4 °F (36.9 °C)   Resp 18   Ht 5' 4\" (1.626 m)   Wt 145 lb 8.1 oz (66 kg)   SpO2 99%   Breastfeeding? No   BMI 24.98 kg/m²          General Appearance:   Well developed, well nourished,alert and oriented x 3, and   individual in no acute distress. Ears/Nose/Mouth/Throat:    Hearing grossly normal.         Neck:  Supple. Chest:    Lungs clear to auscultation bilaterally. Cardiovascular:   Irregularly irregualr rate and rhythm, S1, S2 normal, no murmur. Abdomen:    Soft, non-tender, bowel sounds are present. Extremities:  No edema bilaterally. Skin:  Warm and dry. Telemetry: afib with intermittent RVR. ? aflutter waves in v lead- 12 lead EKG pending.           Data Review:    Labs:    Recent Results (from the past 24 hour(s))   CBC W/O DIFF    Collection Time: 07/18/19  4:14 AM   Result Value Ref Range    WBC 7.6 3.6 - 11.0 K/uL    RBC 4.00 3.80 - 5.20 M/uL    HGB 11.2 (L) 11.5 - 16.0 g/dL    HCT 36.0 35.0 - 47.0 %    MCV 90.0 80.0 - 99.0 FL    MCH 28.0 26.0 - 34.0 PG    MCHC 31.1 30.0 - 36.5 g/dL    RDW 16.5 (H) 11.5 - 14.5 %    PLATELET 232 592 - 919 K/uL    MPV 10.5 8.9 - 12.9 FL    NRBC 0.0 0  WBC    ABSOLUTE NRBC 0.00 0.00 - 0.01 K/uL   PROTHROMBIN TIME + INR    Collection Time: 07/18/19  4:20 AM   Result Value Ref Range    INR 1.8 (H) 0.9 - 1.1      Prothrombin time 17.4 (H) 9.0 - 75.7 sec   METABOLIC PANEL, BASIC    Collection Time: 07/18/19  4:20 AM   Result Value Ref Range    Sodium 140 136 - 145 mmol/L    Potassium 3.8 3.5 - 5.1 mmol/L    Chloride 109 (H) 97 - 108 mmol/L    CO2 25 21 - 32 mmol/L    Anion gap 6 5 - 15 mmol/L    Glucose 100 65 - 100 mg/dL    BUN 16 6 - 20 MG/DL    Creatinine 0.93 0.55 - 1.02 MG/DL    BUN/Creatinine ratio 17 12 - 20      GFR est AA >60 >60 ml/min/1.73m2    GFR est non-AA 59 (L) >60 ml/min/1.73m2    Calcium 8.7 8.5 - 10.1 MG/DL   MAGNESIUM    Collection Time: 07/18/19  4:20 AM   Result Value Ref Range    Magnesium 2.0 1.6 - 2.4 mg/dL          Radiology:        Current Facility-Administered Medications   Medication Dose Route Frequency    benzocaine-menthol (CEPACOL) lozenge 1 Lozenge  1 Lozenge Mucous Membrane PRN    amiodarone (CORDARONE) tablet 400 mg  400 mg Oral BID    dilTIAZem CD (CARDIZEM CD) capsule 120 mg  120 mg Oral DAILY    sodium chloride (NS) flush 5-40 mL  5-40 mL IntraVENous Q8H    sodium chloride (NS) flush 5-40 mL  5-40 mL IntraVENous PRN    acetaminophen (TYLENOL) tablet 650 mg  650 mg Oral Q4H PRN    HYDROcodone-acetaminophen (NORCO) 5-325 mg per tablet 1 Tab  1 Tab Oral Q4H PRN    ondansetron (ZOFRAN) injection 4 mg  4 mg IntraVENous Q4H PRN    levothyroxine (SYNTHROID) tablet 75 mcg  75 mcg Oral ACB    Warfarin - pharmacy to dose   Other Rx Dosing/Monitoring          Ricki Zarco.  LORIN Pulliam     [facility] [address] [place] [address] Drive, [address] [place] [address]   Larry Sanches   [phone]

## 2019-07-19 VITALS
HEART RATE: 86 BPM | WEIGHT: 146.16 LBS | RESPIRATION RATE: 16 BRPM | HEIGHT: 64 IN | BODY MASS INDEX: 24.95 KG/M2 | TEMPERATURE: 98.4 F | SYSTOLIC BLOOD PRESSURE: 109 MMHG | DIASTOLIC BLOOD PRESSURE: 73 MMHG | OXYGEN SATURATION: 99 %

## 2019-07-19 LAB
COMMENT, HOLDF: NORMAL
INR PPP: 3.2 (ref 0.9–1.1)
PROTHROMBIN TIME: 30.7 SEC (ref 9–11.1)
SAMPLES BEING HELD,HOLD: NORMAL

## 2019-07-19 PROCEDURE — 74011250637 HC RX REV CODE- 250/637: Performed by: INTERNAL MEDICINE

## 2019-07-19 PROCEDURE — 74011250637 HC RX REV CODE- 250/637: Performed by: NURSE PRACTITIONER

## 2019-07-19 PROCEDURE — 85610 PROTHROMBIN TIME: CPT

## 2019-07-19 PROCEDURE — 36415 COLL VENOUS BLD VENIPUNCTURE: CPT

## 2019-07-19 RX ORDER — AMIODARONE HYDROCHLORIDE 200 MG/1
200 TABLET ORAL EVERY 12 HOURS
Status: DISCONTINUED | OUTPATIENT
Start: 2019-07-19 | End: 2019-07-19 | Stop reason: HOSPADM

## 2019-07-19 RX ORDER — WARFARIN 2.5 MG/1
2.5 TABLET ORAL EVERY EVENING
Status: DISCONTINUED | OUTPATIENT
Start: 2019-07-21 | End: 2019-07-19 | Stop reason: HOSPADM

## 2019-07-19 RX ORDER — AMIODARONE HYDROCHLORIDE 200 MG/1
200 TABLET ORAL EVERY 12 HOURS
Qty: 35 TAB | Refills: 1 | Status: SHIPPED | OUTPATIENT
Start: 2019-07-19 | End: 2019-07-31 | Stop reason: ALTCHOICE

## 2019-07-19 RX ORDER — WARFARIN 2.5 MG/1
2.5 TABLET ORAL EVERY EVENING
Qty: 30 TAB | Refills: 1 | Status: SHIPPED | OUTPATIENT
Start: 2019-07-21 | End: 2019-08-30 | Stop reason: SDUPTHER

## 2019-07-19 RX ORDER — DILTIAZEM HYDROCHLORIDE 240 MG/1
240 CAPSULE, COATED, EXTENDED RELEASE ORAL DAILY
Qty: 30 CAP | Refills: 1 | Status: SHIPPED | OUTPATIENT
Start: 2019-07-20 | End: 2019-08-21 | Stop reason: SDUPTHER

## 2019-07-19 RX ADMIN — LEVOTHYROXINE SODIUM 75 MCG: 75 TABLET ORAL at 07:04

## 2019-07-19 RX ADMIN — Medication 10 ML: at 07:04

## 2019-07-19 RX ADMIN — DILTIAZEM HYDROCHLORIDE 240 MG: 240 CAPSULE, COATED, EXTENDED RELEASE ORAL at 08:47

## 2019-07-19 RX ADMIN — Medication 10 ML: at 01:45

## 2019-07-19 RX ADMIN — AMIODARONE HYDROCHLORIDE 400 MG: 200 TABLET ORAL at 08:47

## 2019-07-19 NOTE — PROGRESS NOTES
Remains in A Fib, rate controlled. BP stable    Complaints of insomia overnight.  Denies chest pain/ no resurgence of chest pain since yesterday morning -which was relieved with 600mg Motrin    Data  Patient Vitals for the past 12 hrs:   Temp Pulse Resp BP SpO2   07/19/19 0330 98.5 °F (36.9 °C) 78 18 111/69 97 %   07/18/19 2337 98.1 °F (36.7 °C) 73 18 117/63 100 %   07/18/19 2245  87      07/18/19 2230  84      07/18/19 2130  78      07/18/19 2059  79   99 %   07/18/19 1906 98.3 °F (36.8 °C) 72 18 111/65 99 % Satisfactory

## 2019-07-19 NOTE — DISCHARGE INSTRUCTIONS
Left Atrial Appendage Occlusion (WATCHMAN)   Discharge Instructions      You have just underwent left atrial appendage occlusion utilizing a WATCHMAN device deployment. There were catheters temporarily placed in your heart through a puncture in the Veins and/or Arteries in your groin. WHAT TO EXPECT      If you have had a WATCHMAN procedure please notify Dr. Ricki Lucas immediately if you experience chest discomfort, shortness of breath or feeling like you are going to pass out. If the symptoms becomes severe or breathing becomes difficult, call 911 or go to the closest emergency room.  Mild to moderate, non-painful, bruising at the puncture site is not un-common, and will resolve in 7 - 10 days.  If a closure device was used to seal your artery or vein, a separate pamphlet will be given to you with these discharge instructions. It is very important that you review the information in the pamphlet for different restrictions and precautions.  You have a small gauze dressing applied to the puncture site in your groin. You may remove this the following morning. MEDICATIONS      Take only the medications prescribed to you at discharge. ACTIVITY      A responsible adult must take you home. Do not drive a car for 72 hours.  Rest quietly for the remainder of the day.  Do not lift anything greater than 10 pounds for 3 days.  Limit bending at the puncture site and use of stairs for at least 3 days.  You may remove the bandage and shower the morning after the procedure. Do not take a bath for 3 days. SYMPTOMS THAT NEED TO BE REPORTED IMMEDIATELY      Bleeding at the puncture site. If there is bleeding, lie down and hold firm direct pressure for at least 5 minutes. If the bleeding does not stop, go to the closest emergency room, or call 911.  Temperature more than 100.5 F.   Redness or warmth at the puncture site.    Increasing pain, numbness, coolness or blue discoloration of the extremity where the puncture is located.  Pulsating mass at the puncture site.  A new lump at the puncture site, or increasing swelling at the site.  Bruising at the puncture site that enlarges or becomes painful (some bruising at the site is common and will go away in 7 - 10 days).  Rapid heart rate or palpitations.  Dizziness, lightheadedness, fainting.  REMEMBER: If you feel something is an emergency or cannot be handled over the phone, call 911 or go to the closest emergency room.       Ranjit Kidd in 2 weeks  Future Appointments   Date Time Provider Jaylin Polanco   7/22/2019  2:40 PM COUMADIN, DENISSE ALEX [facility] SCHED   7/31/2019  9:20 AM Pau Riggs MD [facility] SCHED     START coumadin as prescribed on Sunday 7/21/2019 at 6PM   Amiodarone dosing is as follows:  Amiodarone 200 mg every 12 hours (1st home dose is at 9PM tonight) for 14 doses (1 week), then take 1 tab daily (starting PM dose 7/26/19)      Kain Bullock MD  Cardiac Electrophysiology / Cardiology    [address]  [address] [place] [address]  [phone] / [phone] Fax   [phone] / [phone] Fax

## 2019-07-19 NOTE — PROGRESS NOTES
The CM met with the patient at bedside in order to discuss disposition. The patient denied having any concerns for discharge home today. The CM presented IM letter- copy left with patient, signed copy placed on chart. The patient endorsed having transportation home today. CM will follow for transitions of care. No Case Management needs identified at this time.  SAIMA Rdz

## 2019-07-19 NOTE — PROGRESS NOTES
Pharmacist Note - Warfarin Dosing  Consult provided for this 66 y. o.female to manage warfarin for Atrial Fibrillation, s/p LA appendage occlusion ligation    INR Goal: 2 - 3    Home regimen/ tablet size: new start (Day 3) - Previously on Xarelto -> severe anemia    Drugs that may increase INR: Amiodarone  Drugs that may decrease INR: None  Other current anticoagulants/ drugs that may increase bleeding risk: None  Risk factors: Age > 65  Daily INR ordered: YES    Recent Labs     07/19/19  0553 07/18/19  0420 07/18/19  0414 07/17/19  0420   HGB  --   --  11.2*  --    INR 3.2* 1.8*  --  1.1     Date               INR                  Dose  7/16  1.1  5 mg  7/17                1.1                   5 mg  7/18                 1.8                  5 mg  7/19  3.2  hold                                                                           Assessment/ Plan:  Hold warfarin today for supratherapeutic INR. Pharmacy will continue to monitor daily and adjust therapy as indicated. Please contact the pharmacist at  for outpatient recommendations if needed.

## 2019-07-19 NOTE — DISCHARGE SUMMARY
Cardiology Discharge Summary     Patient ID:  Daphne Rae  [id number]  20 y.o.  1945    Admit Date: 7/16/2019    Discharge Date: 7/19/2019    Admitting Physician: Cynthia Ahumada MD     Discharge Physician: Yola Diaz. Prosper Taveras MD    Admission Diagnoses:   Atrial fibrillation, unspecified type ([address] [I48.91]  Presence of Watchman left atrial appendage closure device [Z95.818]  Atrial fibrillation ([address] [I48.91]    Discharge Diagnoses: Active Problems:    Presence of Watchman left atrial appendage closure device (7/16/2019)      Atrial fibrillation ([address] (7/16/2019)        Discharge Condition: Good    Cardiology Procedures this Admission:  Left atrial appendage closure device with Watchman  TTE x 2  07/16/19   ECHO ADULT FOLLOW-UP OR LIMITED 07/18/2019 7/18/2019    Narrative · Pericardium: Trivial pericardial effusion. · Left Ventricle: Normal cavity size, wall thickness and systolic function   (ejection fraction normal). Estimated left ventricular ejection fraction   is 56 - 60%. Signed by: Rayo Durbin MD       Consults: none    Hospital Course: Aniyah Flynn a 57 I. o. female with paroxysmal atrial fibrillation status post pulmonary vein isolation in 2015 in [place], hypothyroidism and severe anemia. She has undergone multiple iron transfusions for anemia in the past including 3 transfusions recently and as a result her xarelto was discontinued. Nuclear stress test in 5/2017 demonstrated preserved LV function without ischemia. She underwent holter monitor which demonstrated 20% PVC burden and failed to reveal atrial fibrillation. She was offered Flecainide 50mg BID however she was hesitant to start this. On 7/16/19, She underwent LA appendage closure with Watchman Device. She was monitored overnight and went into Afib with RVR. Started on amiodarone gtt and transitioned to po amiodarone in a.m. Cardizem CD was also started and titrated up.  SHe had some mild chest discomfort when turned to left side, lying down and TTE was repeated 7/18 with trivial pleural effusion only. She had no chest discomfort at time of discharge. By day of discharge, HR had improved- did elevate to 120 with ambulation but quickly normalized with rest.  Her INR was 3.2 on day of discharge and she was instructed to hold coumadin until 7/21. She will follow up in coumadin clinic on 7/22 and with Dr. Enma Handley in 2 weeks. She was informed of potential plans for  DCCV in 1 month if she remains in NSR. MARIA L is planned in 45 days if not done at cardioversion. Visit Vitals  /73 (BP 1 Location: Left arm, BP Patient Position: At rest)   Pulse 86   Temp 98.4 °F (36.9 °C)   Resp 16   Ht 5' 4\" (1.626 m)   Wt 146 lb 2.6 oz (66.3 kg)   SpO2 99%   Breastfeeding? No   BMI 25.09 kg/m²       Physical Exam  Abdomen: soft, non-tender. Bowel sounds normal. No masses,  no organomegaly  Extremities: no LE edema, + PP bilaterally   Heart: Irregularly irregular rate and rhythm, S1, S2 normal, no murmurs, clicks, rubs or gallops  Lungs: clear to auscultation bilaterally  Neck: supple, symmetrical, trachea midline, no adenopathy, no JVD,   Neurologic: Grossly normal  Pulses: 2+ and symmetrical    Labs:   Recent Labs     07/18/19  0414   WBC 7.6   HGB 11.2*   HCT 36.0        Recent Labs     07/19/19  0553 07/18/19  0420 07/17/19  0420   NA  --  140  --    K  --  3.8  --    CL  --  109*  --    CO2  --  25  --    GLU  --  100  --    BUN  --  16  --    CREA  --  0.93  --    CA  --  8.7  --    MG  --  2.0  --    INR 3.2* 1.8* 1.1       No results for input(s): TROIQ, CPK, CKMB in the last 72 hours. EKG:   CXR:   Other Diagnostic Tests:   Device check:     Disposition: Home  Patient Instructions:   Discharge Medication List as of 7/19/2019 11:56 AM      START taking these medications    Details   amiodarone (CORDARONE) 200 mg tablet Take 1 Tab by mouth every twelve (12) hours.  Take 1 tab every 12 hours for 14 doses then take 1 tab daily, Normal, Disp-35 Tab, R-1      dilTIAZem CD (CARDIZEM CD) 240 mg ER capsule Take 1 Cap by mouth daily. , Normal, Disp-30 Cap, R-1      warfarin (COUMADIN) 2.5 mg tablet Take 1 Tab by mouth every evening., Normal, Disp-30 Tab, R-1         CONTINUE these medications which have NOT CHANGED    Details   levothyroxine (SYNTHROID) 75 mcg tablet Take  by mouth Daily (before breakfast). , Historical Med      cyanocobalamin (VITAMIN B-12) 1,000 mcg tablet Take 1,000 mcg by mouth daily (after breakfast). , Historical Med      cholecalciferol (VITAMIN D3) 1,000 unit cap Take  by mouth daily. , Historical Med         STOP taking these medications       aspirin delayed-release 81 mg tablet Comments:   Reason for Stopping:               Reference discharge instructions provided by nursing for diet and activity. Follow-up with   Future Appointments   Date Time Provider Jaylin Polanco   7/22/2019  2:40 PM DENISSE APARICIO [facility]   7/31/2019  9:20 AM Brett Riggs MD [facility] SCHED       Signed:  Bianka Fraire.  LORIN Pulliam

## 2019-07-22 ENCOUNTER — ANTI-COAG VISIT (OUTPATIENT)
Dept: CARDIOLOGY CLINIC | Age: 74
End: 2019-07-22

## 2019-07-22 DIAGNOSIS — Z79.01 ANTICOAGULATED ON COUMADIN: Primary | ICD-10-CM

## 2019-07-22 LAB
INR BLD: 3.2
INR, EXTERNAL: 3.2 (ref 2–3)
PT POC: 37.9 SECONDS
VALID INTERNAL CONTROL?: YES

## 2019-07-24 ENCOUNTER — TELEPHONE (OUTPATIENT)
Dept: CARDIOLOGY CLINIC | Age: 74
End: 2019-07-24

## 2019-07-24 NOTE — TELEPHONE ENCOUNTER
Spoke with patient. Instructed patient per Dr. Antonio Valdez, coumadin should not be held until after 45 day MARIA L post Watchman implant unless the surgery is an emergency. MARIA L is due the first week of September. Will plan for Monday, September 2nd with patient confirmation.

## 2019-07-24 NOTE — TELEPHONE ENCOUNTER
Patient would like to speak to you regarding an upcoming procedure she has on 8/7/19 that she believes needs to be put off due to her being put on blood thinners by Dr. Enma Handley. Please advise.      Phone: [phone]

## 2019-07-26 ENCOUNTER — ANTI-COAG VISIT (OUTPATIENT)
Dept: CARDIOLOGY CLINIC | Age: 74
End: 2019-07-26

## 2019-07-26 DIAGNOSIS — Z98.890 S/P ABLATION OF ATRIAL FIBRILLATION: Primary | ICD-10-CM

## 2019-07-26 DIAGNOSIS — Z86.79 S/P ABLATION OF ATRIAL FIBRILLATION: Primary | ICD-10-CM

## 2019-07-26 DIAGNOSIS — Z95.818 PRESENCE OF WATCHMAN LEFT ATRIAL APPENDAGE CLOSURE DEVICE: ICD-10-CM

## 2019-07-26 LAB
INR BLD: 3.2
INR, EXTERNAL: 3.2 (ref 2–3)
PT POC: 38.8 SECONDS
VALID INTERNAL CONTROL?: YES

## 2019-07-31 ENCOUNTER — ANTI-COAG VISIT (OUTPATIENT)
Dept: CARDIOLOGY CLINIC | Age: 74
End: 2019-07-31

## 2019-07-31 ENCOUNTER — OFFICE VISIT (OUTPATIENT)
Dept: CARDIOLOGY CLINIC | Age: 74
End: 2019-07-31

## 2019-07-31 VITALS
DIASTOLIC BLOOD PRESSURE: 80 MMHG | RESPIRATION RATE: 16 BRPM | HEIGHT: 64 IN | SYSTOLIC BLOOD PRESSURE: 132 MMHG | OXYGEN SATURATION: 96 % | HEART RATE: 60 BPM | BODY MASS INDEX: 24.41 KG/M2 | WEIGHT: 143 LBS

## 2019-07-31 DIAGNOSIS — Z98.890 S/P ABLATION OF ATRIAL FIBRILLATION: Primary | ICD-10-CM

## 2019-07-31 DIAGNOSIS — R53.82 CHRONIC FATIGUE: ICD-10-CM

## 2019-07-31 DIAGNOSIS — Z95.818 PRESENCE OF WATCHMAN LEFT ATRIAL APPENDAGE CLOSURE DEVICE: Primary | ICD-10-CM

## 2019-07-31 DIAGNOSIS — Z98.890 S/P ABLATION OF ATRIAL FIBRILLATION: ICD-10-CM

## 2019-07-31 DIAGNOSIS — D64.9 SYMPTOMATIC ANEMIA: ICD-10-CM

## 2019-07-31 DIAGNOSIS — Z86.79 S/P ABLATION OF ATRIAL FIBRILLATION: Primary | ICD-10-CM

## 2019-07-31 DIAGNOSIS — Z95.818 PRESENCE OF WATCHMAN LEFT ATRIAL APPENDAGE CLOSURE DEVICE: ICD-10-CM

## 2019-07-31 DIAGNOSIS — I48.91 ATRIAL FIBRILLATION, UNSPECIFIED TYPE (HCC): ICD-10-CM

## 2019-07-31 DIAGNOSIS — Z86.79 S/P ABLATION OF ATRIAL FIBRILLATION: ICD-10-CM

## 2019-07-31 LAB
INR BLD: 3.7
INR, EXTERNAL: 3.7 (ref 2–3)
PT POC: 44.5 SECONDS
VALID INTERNAL CONTROL?: YES

## 2019-07-31 NOTE — PROGRESS NOTES
Room # 1    Feels like she is in AF all the time, fatigue, SOB with exertion          Visit Vitals  /80 (BP 1 Location: Left arm, BP Patient Position: Sitting)   Pulse 60   Resp 16   Ht 5' 4\" (1.626 m)   Wt 143 lb (64.9 kg)   SpO2 96%   BMI 24.55 kg/m²

## 2019-07-31 NOTE — PROGRESS NOTES
HISTORY OF PRESENTING ILLNESS      Jose Bautista is a 66 y.o. female with paroxysmal atrial fibrillation status post pulmonary vein isolation in 2015 in [place], hypothyroidism and severe anemia for which she has undergone multiple iron transfusions. Juan Hinkle was previously on rivaroxaban for CVA risk reduction of her atrial fibrillation however this was discontinued. Nuclear stress test in 5/2017 demonstrated preserved LV function without ischemia. She underwent holter monitor which demonstrated 20% PVC burden and failed to reveal atrial fibrillation. She was offered Flecainide 50mg BID however she was hesitant to start this. She was seen by hematologist who felt that her anemia was not driving her symptoms.  We started a trial of low-dose Toprol which has been ineffective in improving her symptoms and was discontinued. She underwent Watchman implantation and experienced AF with RVR post procedure. Echocardiogram showed trivial effusion only. She was discharged on amiodarone and is now in SR. However she is uncertain whether she is still in AF.         ACTIVE PROBLEM LIST     Patient Active Problem List    Diagnosis Date Noted    Presence of Watchman left atrial appendage closure device 07/16/2019     Priority: 1 - One    Atrial fibrillation ([address] 07/16/2019    Hematuria 07/15/2019    Other hyperlipidemia 02/02/2019    Symptomatic anemia 08/23/2017    Hypothyroid 08/23/2017    Cigarette nicotine dependence without complication 68/29/4218    Dental infection 05/20/2017    Calculus of gallbladder with chronic cholecystitis without obstruction 08/15/2016    S/P ablation of atrial fibrillation 08/15/2016    Hashimoto's thyroiditis 08/15/2016    Irritable bowel syndrome with diarrhea 08/15/2016    Paroxysmal A-fib ([address] 09/24/2015           PAST MEDICAL HISTORY     Past Medical History:   Diagnosis Date    Anemia     Atrial fibrillation (HCC)     s/p pulm vein isolation 2015    Atrial fibrillation with rapid ventricular response ([facility] 5/20/2017    Basal cell carcinoma     Depression     GERD (gastroesophageal reflux disease)     Hematuria 7/15/2019    Hypothyroidism     Ill-defined condition     bleeding in liver while on Xarelto    Tremor            PAST SURGICAL HISTORY     Past Surgical History:   Procedure Laterality Date    CARDIAC SURG PROCEDURE UNLIST  2015    cardioversionx4    CARDIAC SURG PROCEDURE UNLIST  2014    ABLATION     COLONOSCOPY N/A 9/6/2017    COLONOSCOPY performed by Lesa Osorio MD at [address] [place] [address] HX AFIB ABLATION  2016    HX CATARACT REMOVAL Bilateral 2015    HX CHOLECYSTECTOMY  08/26/2016    HX DILATION AND CURETTAGE  1989    HX GI      colonoscopy    HX ORTHOPAEDIC Bilateral 2001    arthroscopic knee surgery    HX POLYPECTOMY  2015    HX TONSILLECTOMY  1948    HX TUBAL LIGATION  1989          ALLERGIES     No Known Allergies       FAMILY HISTORY     Family History   Problem Relation Age of Onset    Heart Disease Father     Cancer Paternal Aunt     Anesth Problems Neg Hx     negative for cardiac disease       SOCIAL HISTORY     Social History     Socioeconomic History    Marital status: SINGLE     Spouse name: Not on file    Number of children: Not on file    Years of education: Not on file    Highest education level: Not on file   Tobacco Use    Smoking status: Current Every Day Smoker     Packs/day: 0.50     Years: 50.00     Pack years: 25.00    Smokeless tobacco: Never Used   Substance and Sexual Activity    Alcohol use: Yes     Frequency: Never     Comment: OCCA    Drug use: No    Sexual activity: Not Currently     Partners: Male         MEDICATIONS     Current Outpatient Medications   Medication Sig    amiodarone (CORDARONE) 200 mg tablet Take 1 Tab by mouth every twelve (12) hours.  Take 1 tab every 12 hours for 14 doses then take 1 tab daily    dilTIAZem CD (CARDIZEM CD) 240 mg ER capsule Take 1 Cap by mouth daily.  warfarin (COUMADIN) 2.5 mg tablet Take 1 Tab by mouth every evening.  levothyroxine (SYNTHROID) 75 mcg tablet Take  by mouth Daily (before breakfast).  cyanocobalamin (VITAMIN B-12) 1,000 mcg tablet Take 1,000 mcg by mouth daily (after breakfast).  cholecalciferol (VITAMIN D3) 1,000 unit cap Take  by mouth daily. No current facility-administered medications for this visit. I have reviewed the nurses notes, vitals, problem list, allergy list, medical history, family, social history and medications. REVIEW OF SYMPTOMS      General: Pt denies excessive weight gain or loss. Pt is able to conduct ADL's  HEENT: Denies blurred vision, headaches, hearing loss, epistaxis and difficulty swallowing. Respiratory: Denies cough, congestion, shortness of breath, MARY, wheezing or stridor. Cardiovascular: Denies precordial pain, palpitations, edema or PND  Gastrointestinal: Denies poor appetite, indigestion, abdominal pain or blood in stool  Genitourinary: Denies hematuria, dysuria, increased urinary frequency  Musculoskeletal: Denies joint pain or swelling from muscles or joints  Neurologic: Denies tremor, paresthesias, headache, or sensory motor disturbance  Psychiatric: Denies confusion, insomnia, depression  Integumentray: Denies rash, itching or ulcers. Hematologic: Denies easy bruising, bleeding       PHYSICAL EXAMINATION      There were no vitals filed for this visit. General: Well developed, in no acute distress. HEENT: No jaundice, oral mucosa moist, no oral ulcers  Neck: Supple, no stiffness, no lymphadenopathy, supple  Heart:  Normal S1/S2 negative S3 or S4. Regular, no murmur, gallop or rub, no jugular venous distention  Respiratory: Clear bilaterally x 4, no wheezing or rales  Abdomen:   Soft, non-tender, bowel sounds are active.   Extremities:  No edema, normal cap refill, no cyanosis.   Musculoskeletal: No clubbing, no deformities  Neuro: A&Ox3, speech clear, gait stable, cooperative, no focal neurologic deficits  Skin: Skin color is normal. No rashes or lesions. Non diaphoretic, moist.  Vascular: 2+ pulses symmetric in all extremities       DIAGNOSTIC DATA      EKG: Sinus rhythm       LABORATORY DATA      Lab Results   Component Value Date/Time    WBC 7.6 07/18/2019 04:14 AM    HGB 11.2 (L) 07/18/2019 04:14 AM    HCT 36.0 07/18/2019 04:14 AM    PLATELET 365 13/14/4674 04:14 AM    MCV 90.0 07/18/2019 04:14 AM      Lab Results   Component Value Date/Time    Sodium 140 07/18/2019 04:20 AM    Potassium 3.8 07/18/2019 04:20 AM    Chloride 109 (H) 07/18/2019 04:20 AM    CO2 25 07/18/2019 04:20 AM    Anion gap 6 07/18/2019 04:20 AM    Glucose 100 07/18/2019 04:20 AM    BUN 16 07/18/2019 04:20 AM    Creatinine 0.93 07/18/2019 04:20 AM    BUN/Creatinine ratio 17 07/18/2019 04:20 AM    GFR est AA >60 07/18/2019 04:20 AM    GFR est non-AA 59 (L) 07/18/2019 04:20 AM    Calcium 8.7 07/18/2019 04:20 AM    Bilirubin, total 0.3 07/11/2019 10:45 AM    AST (SGOT) 14 (L) 07/11/2019 10:45 AM    Alk. phosphatase 82 07/11/2019 10:45 AM    Protein, total 6.6 07/11/2019 10:45 AM    Albumin 3.5 07/11/2019 10:45 AM    Globulin 3.1 07/11/2019 10:45 AM    A-G Ratio 1.1 07/11/2019 10:45 AM    ALT (SGPT) 14 07/11/2019 10:45 AM           ASSESSMENT      1. Atrial fibrillation                                         W. Paroxysmal                        O. PVI 2015                        C. HASBLED 2             D. WATCHMAN 7/18/19  2. Severe anemia  3. Hypothyroidism  4. Premature ventricular contractions                        A.  Symptomatic  5. Diverticulosis        PLAN     Discontinue amiodarone and arrange for 30-day monitor to evaluate the patient is experiencing recurrence of atrial fibrillation. Would favor drug therapy rather than AF ablation at this point given she is already poorly tolerant of anticoagulation.   Should she have breakthrough of atrial fibrillation on drug therapy would proceed with considering repeat AF ablation. Plan for MARIA L in 45 days. FOLLOW-UP     Post MARIA L      Thank you, Curtis Genao NP and Dr. Sabi Avalos for allowing me to participate in the care of this extraordinarily pleasant female. Please do not hesitate to contact me for further questions/concerns.          Roxana Bolaños MD  Cardiac Electrophysiology / Cardiology    [facility]  [address]    [address], [address]  [phone] / [phone] Fax   [phone] / [phone] Fax

## 2019-08-02 ENCOUNTER — CLINICAL SUPPORT (OUTPATIENT)
Dept: CARDIOLOGY CLINIC | Age: 74
End: 2019-08-02

## 2019-08-02 DIAGNOSIS — I48.91 ATRIAL FIBRILLATION, UNSPECIFIED TYPE (HCC): Primary | ICD-10-CM

## 2019-08-07 ENCOUNTER — ANTI-COAG VISIT (OUTPATIENT)
Dept: CARDIOLOGY CLINIC | Age: 74
End: 2019-08-07

## 2019-08-07 DIAGNOSIS — Z86.79 S/P ABLATION OF ATRIAL FIBRILLATION: ICD-10-CM

## 2019-08-07 DIAGNOSIS — I48.91 ATRIAL FIBRILLATION, UNSPECIFIED TYPE (HCC): Primary | ICD-10-CM

## 2019-08-07 DIAGNOSIS — Z98.890 S/P ABLATION OF ATRIAL FIBRILLATION: ICD-10-CM

## 2019-08-07 DIAGNOSIS — Z95.818 PRESENCE OF WATCHMAN LEFT ATRIAL APPENDAGE CLOSURE DEVICE: ICD-10-CM

## 2019-08-07 LAB
INR BLD: 2.6
INR, EXTERNAL: 2.6 (ref 2–3)
PT POC: 30.7 SECONDS
VALID INTERNAL CONTROL?: YES

## 2019-08-09 ENCOUNTER — CLINICAL SUPPORT (OUTPATIENT)
Dept: CARDIOLOGY CLINIC | Age: 74
End: 2019-08-09

## 2019-08-21 ENCOUNTER — OFFICE VISIT (OUTPATIENT)
Dept: CARDIOLOGY CLINIC | Age: 74
End: 2019-08-21

## 2019-08-21 VITALS
HEART RATE: 60 BPM | WEIGHT: 148 LBS | SYSTOLIC BLOOD PRESSURE: 130 MMHG | BODY MASS INDEX: 25.27 KG/M2 | RESPIRATION RATE: 16 BRPM | DIASTOLIC BLOOD PRESSURE: 82 MMHG | HEIGHT: 64 IN | OXYGEN SATURATION: 96 %

## 2019-08-21 DIAGNOSIS — I48.0 PAROXYSMAL ATRIAL FIBRILLATION (HCC): Primary | ICD-10-CM

## 2019-08-21 RX ORDER — DILTIAZEM HYDROCHLORIDE 240 MG/1
240 CAPSULE, COATED, EXTENDED RELEASE ORAL DAILY
Qty: 90 CAP | Refills: 3 | Status: SHIPPED | OUTPATIENT
Start: 2019-08-21 | End: 2020-02-19 | Stop reason: SDUPTHER

## 2019-08-21 NOTE — PROGRESS NOTES
Room # 1    Watchman 7/16/19    Event Monitor: 8/9-9/8/19, having difficulty with wearing the monitor    Denies any cardiac complaints at this time.     Visit Vitals  /82 (BP 1 Location: Left arm, BP Patient Position: Sitting)   Pulse 60   Resp 16   Ht 5' 4\" (1.626 m)   Wt 148 lb (67.1 kg)   SpO2 96%   BMI 25.40 kg/m²

## 2019-08-21 NOTE — PROGRESS NOTES
HISTORY OF PRESENTING ILLNESS      Jessica Tesfaye is a 66 y.o. female with paroxysmal atrial fibrillation status post pulmonary vein isolation in 2015 in [place], hypothyroidism and severe anemia for which she has undergone multiple iron transfusions. Pio Mcqueen was previously on rivaroxaban for CVA risk reduction of her atrial fibrillation however this was discontinued. Nuclear stress test in 5/2017 demonstrated preserved LV function without ischemia. She underwent holter monitor which demonstrated 20% PVC burden and failed to reveal atrial fibrillation. She was offered Flecainide 50mg BID however she was hesitant to start this. She was seen by hematologist who felt that her anemia was not driving her symptoms.  We started a trial of low-dose Toprol which has been ineffective in improving her symptoms and was discontinued. She underwent Watchman implantation and experienced AF with RVR post procedure. Echocardiogram showed trivial effusion only. She was discharged on amiodarone and subsequently converted to SR. She underwent monitoring which failed to reveal recurrence of AF while off amiodarone.          ACTIVE PROBLEM LIST     Patient Active Problem List    Diagnosis Date Noted    Presence of Watchman left atrial appendage closure device 07/16/2019     Priority: 1 - One    Atrial fibrillation ([address] 07/16/2019    Hematuria 07/15/2019    Other hyperlipidemia 02/02/2019    Symptomatic anemia 08/23/2017    Hypothyroid 08/23/2017    Cigarette nicotine dependence without complication 90/31/6220    Dental infection 05/20/2017    Calculus of gallbladder with chronic cholecystitis without obstruction 08/15/2016    S/P ablation of atrial fibrillation 08/15/2016    Hashimoto's thyroiditis 08/15/2016    Irritable bowel syndrome with diarrhea 08/15/2016    Paroxysmal A-fib ([address] 09/24/2015           PAST MEDICAL HISTORY     Past Medical History:   Diagnosis Date    Anemia     Atrial fibrillation ([address] s/p pulm vein isolation     Atrial fibrillation with rapid ventricular response ([address] 2017    Basal cell carcinoma     Depression     GERD (gastroesophageal reflux disease)     Hematuria 7/15/2019    Hypothyroidism     Ill-defined condition     bleeding in liver while on Xarelto    Tremor            PAST SURGICAL HISTORY     Past Surgical History:   Procedure Laterality Date    CARDIAC SURG PROCEDURE UNLIST      cardioversionx4    CARDIAC SURG PROCEDURE UNLIST      ABLATION     COLONOSCOPY N/A 2017    COLONOSCOPY performed by Luis Smallwood MD at [address] HX AFIB ABLATION  2016    HX CATARACT REMOVAL Bilateral 2015    HX CHOLECYSTECTOMY  2016    HX DILATION AND CURETTAGE      HX GI      colonoscopy    HX ORTHOPAEDIC Bilateral     arthroscopic knee surgery    HX POLYPECTOMY      HX TONSILLECTOMY      HX TUBAL LIGATION            ALLERGIES     No Known Allergies       FAMILY HISTORY     Family History   Problem Relation Age of Onset    Heart Disease Father     Cancer Paternal Aunt     Anesth Problems Neg Hx     negative for cardiac disease       SOCIAL HISTORY     Social History     Socioeconomic History    Marital status: SINGLE     Spouse name: Not on file    Number of children: Not on file    Years of education: Not on file    Highest education level: Not on file   Tobacco Use    Smoking status: Former Smoker     Packs/day: 0.50     Years: 50.00     Pack years: 25.00     Last attempt to quit: 2019     Years since quittin.0    Smokeless tobacco: Never Used   Substance and Sexual Activity    Alcohol use: Yes     Frequency: Never     Comment: OCCA    Drug use: No    Sexual activity: Not Currently     Partners: Male         MEDICATIONS     Current Outpatient Medications   Medication Sig    dilTIAZem CD (CARDIZEM CD) 240 mg ER capsule Take 1 Cap by mouth daily.     warfarin (COUMADIN) 2.5 mg tablet Take 1 Tab by mouth every evening.  levothyroxine (SYNTHROID) 75 mcg tablet Take  by mouth Daily (before breakfast). No current facility-administered medications for this visit. I have reviewed the nurses notes, vitals, problem list, allergy list, medical history, family, social history and medications. REVIEW OF SYMPTOMS      General: Pt denies excessive weight gain or loss. Pt is able to conduct ADL's  HEENT: Denies blurred vision, headaches, hearing loss, epistaxis and difficulty swallowing. Respiratory: Denies cough, congestion, shortness of breath, MARY, wheezing or stridor. Cardiovascular: Denies precordial pain, palpitations, edema or PND  Gastrointestinal: Denies poor appetite, indigestion, abdominal pain or blood in stool  Genitourinary: Denies hematuria, dysuria, increased urinary frequency  Musculoskeletal: Denies joint pain or swelling from muscles or joints  Neurologic: Denies tremor, paresthesias, headache, or sensory motor disturbance  Psychiatric: Denies confusion, insomnia, depression  Integumentray: Denies rash, itching or ulcers. Hematologic: Denies easy bruising, bleeding       PHYSICAL EXAMINATION      Vitals:    08/21/19 1538   BP: 130/82   Pulse: 60   Resp: 16   SpO2: 96%   Weight: 148 lb (67.1 kg)   Height: 5' 4\" (1.626 m)     General: Well developed, in no acute distress. HEENT: No jaundice, oral mucosa moist, no oral ulcers  Neck: Supple, no stiffness, no lymphadenopathy, supple  Heart:  Normal S1/S2 negative S3 or S4. Regular, no murmur, gallop or rub, no jugular venous distention  Respiratory: Clear bilaterally x 4, no wheezing or rales  Abdomen:   Soft, non-tender, bowel sounds are active.   Extremities:  No edema, normal cap refill, no cyanosis. Musculoskeletal: No clubbing, no deformities  Neuro: A&Ox3, speech clear, gait stable, cooperative, no focal neurologic deficits  Skin: Skin color is normal. No rashes or lesions.  Non diaphoretic, moist.  Vascular: 2+ pulses symmetric in all extremities       DIAGNOSTIC DATA      EKG:        LABORATORY DATA      Lab Results   Component Value Date/Time    WBC 7.6 07/18/2019 04:14 AM    HGB 11.2 (L) 07/18/2019 04:14 AM    HCT 36.0 07/18/2019 04:14 AM    PLATELET 780 02/25/5561 04:14 AM    MCV 90.0 07/18/2019 04:14 AM      Lab Results   Component Value Date/Time    Sodium 140 07/18/2019 04:20 AM    Potassium 3.8 07/18/2019 04:20 AM    Chloride 109 (H) 07/18/2019 04:20 AM    CO2 25 07/18/2019 04:20 AM    Anion gap 6 07/18/2019 04:20 AM    Glucose 100 07/18/2019 04:20 AM    BUN 16 07/18/2019 04:20 AM    Creatinine 0.93 07/18/2019 04:20 AM    BUN/Creatinine ratio 17 07/18/2019 04:20 AM    GFR est AA >60 07/18/2019 04:20 AM    GFR est non-AA 59 (L) 07/18/2019 04:20 AM    Calcium 8.7 07/18/2019 04:20 AM    Bilirubin, total 0.3 07/11/2019 10:45 AM    AST (SGOT) 14 (L) 07/11/2019 10:45 AM    Alk. phosphatase 82 07/11/2019 10:45 AM    Protein, total 6.6 07/11/2019 10:45 AM    Albumin 3.5 07/11/2019 10:45 AM    Globulin 3.1 07/11/2019 10:45 AM    A-G Ratio 1.1 07/11/2019 10:45 AM    ALT (SGPT) 14 07/11/2019 10:45 AM           ASSESSMENT      1. Atrial fibrillation                                         G. Paroxysmal                        G. PVI 2015                        C. HASBLED 2                        D. WATCHMAN 7/18/19  2. Severe anemia  3. Hypothyroidism  4. Premature ventricular contractions                        A.  Symptomatic  5. Diverticulosis       PLAN     Acceptable to remain off of amiodarone. We will continue diltiazem. Plan for MARIA L to evaluate left atrial appendage occlusion device. FOLLOW-UP     Post procedure      Thank you, Arya Huitron NP for allowing me to participate in the care of this extraordinarily pleasant female. Please do not hesitate to contact me for further questions/concerns.          Chandra Howard MD  Cardiac Electrophysiology / Cardiology    [address] [place] [address]. [facility]  [address] [facility], [facility], [address]  [place], [address]  [phone] / [phone] Fax   [phone] / [phone] Fax

## 2019-08-27 ENCOUNTER — ANTI-COAG VISIT (OUTPATIENT)
Dept: CARDIOLOGY CLINIC | Age: 74
End: 2019-08-27

## 2019-08-27 DIAGNOSIS — Z86.79 S/P ABLATION OF ATRIAL FIBRILLATION: ICD-10-CM

## 2019-08-27 DIAGNOSIS — Z98.890 S/P ABLATION OF ATRIAL FIBRILLATION: ICD-10-CM

## 2019-08-27 DIAGNOSIS — I48.91 ATRIAL FIBRILLATION, UNSPECIFIED TYPE (HCC): Primary | ICD-10-CM

## 2019-08-27 DIAGNOSIS — Z95.818 PRESENCE OF WATCHMAN LEFT ATRIAL APPENDAGE CLOSURE DEVICE: ICD-10-CM

## 2019-08-27 LAB
INR BLD: 2.2
INR BLD: 3.7
INR, EXTERNAL: 2.2 (ref 2–3)
PT POC: 26.3 SECONDS
PT POC: NORMAL
VALID INTERNAL CONTROL?: YES
VALID INTERNAL CONTROL?: YES

## 2019-08-29 RX ORDER — SODIUM CHLORIDE 0.9 % (FLUSH) 0.9 %
5-40 SYRINGE (ML) INJECTION AS NEEDED
Status: CANCELLED | OUTPATIENT
Start: 2019-08-29

## 2019-08-29 RX ORDER — SODIUM CHLORIDE 0.9 % (FLUSH) 0.9 %
5-40 SYRINGE (ML) INJECTION EVERY 8 HOURS
Status: CANCELLED | OUTPATIENT
Start: 2019-08-29

## 2019-08-30 RX ORDER — WARFARIN 2.5 MG/1
2.5 TABLET ORAL EVERY EVENING
Qty: 30 TAB | Refills: 1 | Status: SHIPPED | OUTPATIENT
Start: 2019-08-30 | End: 2019-09-06

## 2019-09-06 ENCOUNTER — HOSPITAL ENCOUNTER (OUTPATIENT)
Dept: CARDIAC CATH/INVASIVE PROCEDURES | Age: 74
Discharge: HOME | End: 2019-09-06
Attending: INTERNAL MEDICINE | Admitting: INTERNAL MEDICINE
Payer: MEDICARE

## 2019-09-06 VITALS
BODY MASS INDEX: 24.92 KG/M2 | OXYGEN SATURATION: 100 % | HEART RATE: 57 BPM | WEIGHT: 145.94 LBS | RESPIRATION RATE: 18 BRPM | SYSTOLIC BLOOD PRESSURE: 105 MMHG | TEMPERATURE: 98.1 F | DIASTOLIC BLOOD PRESSURE: 52 MMHG | HEIGHT: 64 IN

## 2019-09-06 DIAGNOSIS — I48.0 PAROXYSMAL ATRIAL FIBRILLATION (HCC): ICD-10-CM

## 2019-09-06 LAB — INR BLD: 2.1 (ref 0.9–1.2)

## 2019-09-06 PROCEDURE — 99152 MOD SED SAME PHYS/QHP 5/>YRS: CPT

## 2019-09-06 PROCEDURE — 93312 ECHO TRANSESOPHAGEAL: CPT

## 2019-09-06 PROCEDURE — 74011250636 HC RX REV CODE- 250/636

## 2019-09-06 PROCEDURE — 85610 PROTHROMBIN TIME: CPT

## 2019-09-06 PROCEDURE — 93325 DOPPLER ECHO COLOR FLOW MAPG: CPT

## 2019-09-06 PROCEDURE — 93312 ECHO TRANSESOPHAGEAL: CPT | Performed by: INTERNAL MEDICINE

## 2019-09-06 PROCEDURE — 93320 DOPPLER ECHO COMPLETE: CPT

## 2019-09-06 RX ORDER — CLOPIDOGREL BISULFATE 75 MG/1
75 TABLET ORAL DAILY
Qty: 30 TAB | Refills: 3 | Status: SHIPPED | OUTPATIENT
Start: 2019-09-06 | End: 2019-12-18 | Stop reason: ALTCHOICE

## 2019-09-06 RX ORDER — FENTANYL CITRATE 50 UG/ML
INJECTION, SOLUTION INTRAMUSCULAR; INTRAVENOUS AS NEEDED
Status: DISCONTINUED | OUTPATIENT
Start: 2019-09-06 | End: 2019-09-06 | Stop reason: HOSPADM

## 2019-09-06 RX ORDER — LIDOCAINE 50 MG/G
OINTMENT TOPICAL AS NEEDED
Status: DISCONTINUED | OUTPATIENT
Start: 2019-09-06 | End: 2019-09-06 | Stop reason: HOSPADM

## 2019-09-06 RX ORDER — MIDAZOLAM HYDROCHLORIDE 1 MG/ML
INJECTION, SOLUTION INTRAMUSCULAR; INTRAVENOUS AS NEEDED
Status: DISCONTINUED | OUTPATIENT
Start: 2019-09-06 | End: 2019-09-06 | Stop reason: HOSPADM

## 2019-09-06 RX ORDER — ASPIRIN 81 MG/1
81 TABLET ORAL DAILY
Qty: 30 TAB | Refills: 3 | Status: SHIPPED
Start: 2019-09-06 | End: 2020-06-30

## 2019-09-06 RX ORDER — SODIUM CHLORIDE 0.9 % (FLUSH) 0.9 %
5-40 SYRINGE (ML) INJECTION AS NEEDED
Status: DISCONTINUED | OUTPATIENT
Start: 2019-09-06 | End: 2019-09-06 | Stop reason: HOSPADM

## 2019-09-06 RX ORDER — LIDOCAINE HYDROCHLORIDE 20 MG/ML
SOLUTION OROPHARYNGEAL AS NEEDED
Status: DISCONTINUED | OUTPATIENT
Start: 2019-09-06 | End: 2019-09-06 | Stop reason: HOSPADM

## 2019-09-06 RX ORDER — PANTOPRAZOLE SODIUM 40 MG/1
40 TABLET, DELAYED RELEASE ORAL DAILY
Qty: 30 TAB | Refills: 3 | Status: SHIPPED | OUTPATIENT
Start: 2019-09-06 | End: 2020-01-03 | Stop reason: SDUPTHER

## 2019-09-06 RX ORDER — SODIUM CHLORIDE 0.9 % (FLUSH) 0.9 %
5-40 SYRINGE (ML) INJECTION EVERY 8 HOURS
Status: DISCONTINUED | OUTPATIENT
Start: 2019-09-06 | End: 2019-09-06 | Stop reason: HOSPADM

## 2019-09-06 RX ADMIN — MIDAZOLAM HYDROCHLORIDE 1 MG: 1 INJECTION, SOLUTION INTRAMUSCULAR; INTRAVENOUS at 16:41

## 2019-09-06 RX ADMIN — FENTANYL CITRATE 25 MCG: 50 INJECTION, SOLUTION INTRAMUSCULAR; INTRAVENOUS at 16:41

## 2019-09-06 RX ADMIN — FENTANYL CITRATE 50 MCG: 50 INJECTION, SOLUTION INTRAMUSCULAR; INTRAVENOUS at 16:37

## 2019-09-06 RX ADMIN — LIDOCAINE 1 G: 50 OINTMENT TOPICAL at 16:15

## 2019-09-06 RX ADMIN — MIDAZOLAM HYDROCHLORIDE 2 MG: 1 INJECTION, SOLUTION INTRAMUSCULAR; INTRAVENOUS at 16:37

## 2019-09-06 RX ADMIN — LIDOCAINE HYDROCHLORIDE 15 ML: 20 SOLUTION OROPHARYNGEAL at 16:13

## 2019-09-06 RX ADMIN — MIDAZOLAM HYDROCHLORIDE 1 MG: 1 INJECTION, SOLUTION INTRAMUSCULAR; INTRAVENOUS at 16:44

## 2019-09-06 NOTE — ROUTINE PROCESS
05:45 Patient arrived. ID and allergies verified verbally with patient. Pt voices understanding of procedure to be performed. Consent obtained. Pt prepped for procedure. INR is 2.1  16:05 TRANSFER - OUT REPORT:    Verbal report given to JAYME Danielle(name) on Rafaela Rodriguez  being transferred to (unit) for routine progression of care       Report consisted of patients Situation, Background, Assessment and   Recommendations(SBAR). Information from the following report(s) Procedure Summary was reviewed with the receiving nurse. Lines:   Peripheral IV 09/06/19 Left Antecubital (Active)   Site Assessment Clean, dry, & intact 9/6/2019  1:42 PM        Opportunity for questions and clarification was provided. Patient transported with:   Registered Nurse  16:50 TRANSFER - IN REPORT:    Verbal report received from JAYME Danielle(name) on Rafaela Rodriguez  being received from (unit) for routine progression of care      Report consisted of patients Situation, Background, Assessment and   Recommendations(SBAR). Information from the following report(s) Procedure Summary was reviewed with the receiving nurse. Opportunity for questions and clarification was provided. Assessment completed upon patients arrival to unit and care assumed. 17:30,sitting up on side of bed. DC instructions reviewed with pt. Her daughter is picking her up. Pt voices understanding of DC instructions. 15:45,Pt DC via JAMILAH [address] with daughter.

## 2019-09-06 NOTE — H&P
HISTORY OF PRESENTING ILLNESS      Manny Mathew is a 66 y.o. female with paroxysmal atrial fibrillation status post pulmonary vein isolation in 2015 in [place], hypothyroidism and severe anemia for which she has undergone multiple iron transfusions. El Avendano was previously on rivaroxaban for CVA risk reduction of her atrial fibrillation however this was discontinued. Nuclear stress test in 5/2017 demonstrated preserved LV function without ischemia. She underwent holter monitor which demonstrated 20% PVC burden and failed to reveal atrial fibrillation. She was offered Flecainide 50mg BID however she was hesitant to start this. She was seen by hematologist who felt that her anemia was not driving her symptoms.  We started a trial of low-dose Toprol which has been ineffective in improving her symptoms and was discontinued. She underwent Watchman implantation and experienced AF with RVR post procedure. Echocardiogram showed trivial effusion only. She was discharged on amiodarone and subsequently converted to SR.  She underwent monitoring which failed to reveal recurrence of AF while off amiodarone.           ACTIVE PROBLEM LIST            Patient Active Problem List     Diagnosis Date Noted    Presence of Watchman left atrial appendage closure device 07/16/2019       Priority: 1 - One    Atrial fibrillation ([address] 07/16/2019    Hematuria 07/15/2019    Other hyperlipidemia 02/02/2019    Symptomatic anemia 08/23/2017    Hypothyroid 08/23/2017    Cigarette nicotine dependence without complication 28/92/8286    Dental infection 05/20/2017    Calculus of gallbladder with chronic cholecystitis without obstruction 08/15/2016    S/P ablation of atrial fibrillation 08/15/2016    Hashimoto's thyroiditis 08/15/2016    Irritable bowel syndrome with diarrhea 08/15/2016    Paroxysmal A-fib ([address] 09/24/2015             PAST MEDICAL HISTORY           Past Medical History:   Diagnosis Date    Anemia      At[facility]       s/p pulm vein isolation     Atrial fibrillation with rapid ventricular response ([facility] 2017    Basal cell carcinoma      Depression      GERD (gastroesophageal reflux disease)      Hematuria 7/15/2019    Hypothyroidism      Ill-defined condition       bleeding in liver while on Xarelto    Tremor               PAST SURGICAL HISTORY            Past Surgical History:   Procedure Laterality Date    CARDIAC SURG PROCEDURE UNLIST        cardioversionx4    CARDIAC SURG PROCEDURE UNLIST        ABLATION     COLONOSCOPY N/A 2017     COLONOSCOPY performed by Bernarda Calles MD at [address] [facility] [address]    HX AFIB ABLATION   2016    HX CATARACT REMOVAL Bilateral     HX CHOLECYSTECTOMY   2016    HX DILATION AND CURETTAGE       HX GI         colonoscopy    HX ORTHOPAEDIC Bilateral      arthroscopic knee surgery    HX POLYPECTOMY       HX TONSILLECTOMY       HX TUBAL LIGATION                ALLERGIES      No Known Allergies         FAMILY HISTORY            Family History   Problem Relation Age of Onset    Heart Disease Father      Cancer Paternal Aunt      Anesth Problems Neg Hx      negative for cardiac disease         SOCIAL HISTORY      Social History               Socioeconomic History    Marital status: SINGLE       Spouse name: Not on file    Number of children: Not on file    Years of education: Not on file    Highest education level: Not on file   Tobacco Use    Smoking status: Former Smoker       Packs/day: 0.50       Years: 50.00       Pack years: 25.00       Last attempt to quit: 2019       Years since quittin.0    Smokeless tobacco: Never Used   Substance and Sexual Activity    Alcohol use:  Yes       Frequency: Never       Comment: OCCA    Drug use: No    Sexual activity: Not Currently       Partners: Male               MEDICATIONS           Current Outpatient Medications Medication Sig    dilTIAZem CD (CARDIZEM CD) 240 mg ER capsule Take 1 Cap by mouth daily.  warfarin (COUMADIN) 2.5 mg tablet Take 1 Tab by mouth every evening.  levothyroxine (SYNTHROID) 75 mcg tablet Take  by mouth Daily (before breakfast).      No current facility-administered medications for this visit.          I have reviewed the nurses notes, vitals, problem list, allergy list, medical history, family, social history and medications.         REVIEW OF SYMPTOMS      General: Pt denies excessive weight gain or loss. Pt is able to conduct ADL's  HEENT: Denies blurred vision, headaches, hearing loss, epistaxis and difficulty swallowing. Respiratory: Denies cough, congestion, shortness of breath, MARY, wheezing or stridor. Cardiovascular: Denies precordial pain, palpitations, edema or PND  Gastrointestinal: Denies poor appetite, indigestion, abdominal pain or blood in stool  Genitourinary: Denies hematuria, dysuria, increased urinary frequency  Musculoskeletal: Denies joint pain or swelling from muscles or joints  Neurologic: Denies tremor, paresthesias, headache, or sensory motor disturbance  Psychiatric: Denies confusion, insomnia, depression  Integumentray: Denies rash, itching or ulcers. Hematologic: Denies easy bruising, bleeding         PHYSICAL EXAMINATION          Vitals:     08/21/19 1538   BP: 130/82   Pulse: 60   Resp: 16   SpO2: 96%   Weight: 148 lb (67.1 kg)   Height: 5' 4\" (1.626 m)      General: Well developed, in no acute distress. HEENT: No jaundice, oral mucosa moist, no oral ulcers  Neck: Supple, no stiffness, no lymphadenopathy, supple  Heart:  Normal S1/S2 negative S3 or S4. Regular, no murmur, gallop or rub, no jugular venous distention  Respiratory: Clear bilaterally x 4, no wheezing or rales  Abdomen:   Soft, non-tender, bowel sounds are active.   Extremities:  No edema, normal cap refill, no cyanosis.   Musculoskeletal: No clubbing, no deformities  Neuro: A&Ox3, speech clear, gait stable, cooperative, no focal neurologic deficits  Skin: Skin color is normal. No rashes or lesions. Non diaphoretic, moist.  Vascular: 2+ pulses symmetric in all extremities         DIAGNOSTIC DATA      EKG:          LABORATORY DATA            Lab Results   Component Value Date/Time     WBC 7.6 07/18/2019 04:14 AM     HGB 11.2 (L) 07/18/2019 04:14 AM     HCT 36.0 07/18/2019 04:14 AM     PLATELET 384 46/44/8073 04:14 AM     MCV 90.0 07/18/2019 04:14 AM            Lab Results   Component Value Date/Time     Sodium 140 07/18/2019 04:20 AM     Potassium 3.8 07/18/2019 04:20 AM     Chloride 109 (H) 07/18/2019 04:20 AM     CO2 25 07/18/2019 04:20 AM     Anion gap 6 07/18/2019 04:20 AM     Glucose 100 07/18/2019 04:20 AM     BUN 16 07/18/2019 04:20 AM     Creatinine 0.93 07/18/2019 04:20 AM     BUN/Creatinine ratio 17 07/18/2019 04:20 AM     GFR est AA >60 07/18/2019 04:20 AM     GFR est non-AA 59 (L) 07/18/2019 04:20 AM     Calcium 8.7 07/18/2019 04:20 AM     Bilirubin, total 0.3 07/11/2019 10:45 AM     AST (SGOT) 14 (L) 07/11/2019 10:45 AM     Alk. phosphatase 82 07/11/2019 10:45 AM     Protein, total 6.6 07/11/2019 10:45 AM     Albumin 3.5 07/11/2019 10:45 AM     Globulin 3.1 07/11/2019 10:45 AM     A-G Ratio 1.1 07/11/2019 10:45 AM     ALT (SGPT) 14 07/11/2019 10:45 AM             ASSESSMENT      1. Atrial fibrillation                                         K. Paroxysmal                        K. PVI 2015                        C. HASBLED 2                        D. WATCHMAN 7/18/19  2. Severe anemia  3. Hypothyroidism  4. Premature ventricular contractions                        A.  Symptomatic  5. Diverticulosis         PLAN      Acceptable to remain off of amiodarone. We will continue diltiazem.   Plan for MARIA L to evaluate left atrial appendage occlusion device.         FOLLOW-UP      Post procedure        Thank you, Cooper Sales NP for allowing me to participate in the care of this extraordinarily pleasant female.  Please do not hesitate to contact me for further questions/concerns.            Alecia Cardenas MD  Cardiac Electrophysiology / Cardiology     [address] [place] [address]  [address] [facility], [address] [address] [address], [address]  [address]  [phone] / [phone] Fax                                    [phone] / [phone] Fax

## 2019-09-06 NOTE — PROCEDURES
MARIA L performed and demonstrated well seated LAAO.     DC coumadin; start ASA/plavix/protonix    Follow up 6 months    Dennise Price MD

## 2019-09-09 ENCOUNTER — TELEPHONE (OUTPATIENT)
Dept: CARDIOLOGY CLINIC | Age: 74
End: 2019-09-09

## 2019-09-09 ENCOUNTER — OFFICE VISIT (OUTPATIENT)
Dept: CARDIOLOGY CLINIC | Age: 74
End: 2019-09-09

## 2019-09-09 VITALS
WEIGHT: 145 LBS | RESPIRATION RATE: 16 BRPM | HEART RATE: 56 BPM | OXYGEN SATURATION: 95 % | BODY MASS INDEX: 24.75 KG/M2 | DIASTOLIC BLOOD PRESSURE: 78 MMHG | SYSTOLIC BLOOD PRESSURE: 120 MMHG | HEIGHT: 64 IN

## 2019-09-09 DIAGNOSIS — R06.02 SHORTNESS OF BREATH: ICD-10-CM

## 2019-09-09 DIAGNOSIS — R06.02 SOB (SHORTNESS OF BREATH): ICD-10-CM

## 2019-09-09 DIAGNOSIS — R53.1 WEAKNESS: ICD-10-CM

## 2019-09-09 DIAGNOSIS — Z98.890 S/P ABLATION OF ATRIAL FIBRILLATION: ICD-10-CM

## 2019-09-09 DIAGNOSIS — I48.91 ATRIAL FIBRILLATION, UNSPECIFIED TYPE (HCC): Primary | ICD-10-CM

## 2019-09-09 DIAGNOSIS — Z86.79 S/P ABLATION OF ATRIAL FIBRILLATION: ICD-10-CM

## 2019-09-09 DIAGNOSIS — R00.0 RACING HEART BEAT: ICD-10-CM

## 2019-09-09 DIAGNOSIS — Z95.818 PRESENCE OF WATCHMAN LEFT ATRIAL APPENDAGE CLOSURE DEVICE: ICD-10-CM

## 2019-09-09 NOTE — PROGRESS NOTES
Chief Complaint   Patient presents with    Irregular Heart Beat     Heart Racing    Shortness of Breath     Visit Vitals  /78 (BP 1 Location: Left arm, BP Patient Position: Sitting)   Pulse (!) 56   Resp 16   Ht 5' 4\" (1.626 m)   Wt 145 lb (65.8 kg)   SpO2 95%   BMI 24.89 kg/m²         Patient presents in office with c/o \"heart racing,\" and SOB since Friday of last week. .       Patient is s/p Watchman.

## 2019-09-09 NOTE — TELEPHONE ENCOUNTER
SOB, lightheaded, dizzy  Procedure Friday checking out watchman; since then breathless, lightheaded, weak, tired. New medication ASA, off blood thinner. Went through [facility]. Confirmed phone number. Vitals:  Doesn't have BP cuff; usually fine. Heart is racing. Unable to take pulse rate. Please advise.

## 2019-09-09 NOTE — TELEPHONE ENCOUNTER
Spoke to pt, she is on her way up to office now. Please work her into Celeste's schedule. Per ANTONINA Paz, \"Pt needs to be seen by EP or ED.  If severely symptomatic, I agree ED is the most appropriate or she can be seen at 10:20 if Janki Palmer is willing to overbook.       Thanks for coordinating! \"

## 2019-09-09 NOTE — PROGRESS NOTES
HISTORY OF PRESENTING ILLNESS      Lexie Markham is a 66 y.o. female with paroxysmal atrial fibrillation status post pulmonary vein isolation in 2015 in [place], hypothyroidism and severe anemia for which she has undergone multiple iron transfusions. Darrell Brown was previously on rivaroxaban for CVA risk reduction of her atrial fibrillation however this was discontinued. Nuclear stress test in 5/2017 demonstrated preserved LV function without ischemia. She underwent holter monitor which demonstrated 20% PVC burden and failed to reveal atrial fibrillation. She was offered Flecainide 50mg BID however she was hesitant to start this. She was seen by hematologist who felt that her anemia was not driving her symptoms.  We started a trial of low-dose Toprol which has been ineffective in improving her symptoms and was discontinued. She underwent Watchman implantation and experienced AF with RVR post procedure. Echocardiogram showed trivial effusion only. She was discharged on amiodarone and subsequently converted to SR. She underwent monitoring which failed to reveal recurrence of AF while off amiodarone.       45 day MARIA L showed well seated watchman. Coumadin was discontinued and ASA/Plavix/Protonix. She reports shortness of breath, weakness and heart racing x 1 week. She had her thyroid medication adjusted last week with her PCP and after her MARIA L on Friday felt that her symptoms were continuing through the weekend. EKG shows sinus bradycardia and her exam is otherwise normal. She denies additional complaints including blood in urine/stool.      ACTIVE PROBLEM LIST     Patient Active Problem List    Diagnosis Date Noted    Presence of Watchman left atrial appendage closure device 07/16/2019     Priority: 1 - One    Atrial fibrillation ([address] 07/16/2019    Hematuria 07/15/2019    Other hyperlipidemia 02/02/2019    Symptomatic anemia 08/23/2017    Hypothyroid 08/23/2017    Cigarette nicotine dependence without complication 66/48/2363    Dental infection 05/20/2017    Calculus of gallbladder with chronic cholecystitis without obstruction 08/15/2016    S/P ablation of atrial fibrillation 08/15/2016    Hashimoto's thyroiditis 08/15/2016    Irritable bowel syndrome with diarrhea 08/15/2016    Paroxysmal A-fib [facility] 09/24/2015           PAST MEDICAL HISTORY     Past Medical History:   Diagnosis Date    Anemia     Atrial fibrillation (HCC)     s/p pulm vein isolation 2015    Atrial fibrillation with rapid ventricular response ([address] 5/20/2017    Basal cell carcinoma     Depression     GERD (gastroesophageal reflux disease)     Hematuria 7/15/2019    Hypothyroidism     Ill-defined condition     bleeding in liver while on Xarelto    Tremor            PAST SURGICAL HISTORY     Past Surgical History:   Procedure Laterality Date    CARDIAC SURG PROCEDURE UNLIST  2015    cardioversionx4    CARDIAC SURG PROCEDURE UNLIST  2014    ABLATION     COLONOSCOPY N/A 9/6/2017    COLONOSCOPY performed by Lesa Osorio MD at [address] [place] [address] HX AFIB ABLATION  2016    HX CATARACT REMOVAL Bilateral 2015    HX CHOLECYSTECTOMY  08/26/2016    HX DILATION AND CURETTAGE  1989    HX GI      colonoscopy    HX ORTHOPAEDIC Bilateral 2001    arthroscopic knee surgery    HX POLYPECTOMY  2015    HX TONSILLECTOMY  1948    HX TUBAL LIGATION  1989          ALLERGIES     No Known Allergies       FAMILY HISTORY     Family History   Problem Relation Age of Onset    Heart Disease Father     Cancer Paternal Aunt     Anesth Problems Neg Hx     negative for cardiac disease       SOCIAL HISTORY     Social History     Socioeconomic History    Marital status: SINGLE     Spouse name: Not on file    Number of children: Not on file    Years of education: Not on file    Highest education level: Not on file   Tobacco Use    Smoking status: Former Smoker     Packs/day: 0.50     Years: 50.00     Pack years: 25.00     Last attempt to quit: 2019     Years since quittin.1    Smokeless tobacco: Never Used   Substance and Sexual Activity    Alcohol use: Yes     Frequency: Never     Comment: OCCA    Drug use: No    Sexual activity: Not Currently     Partners: Male         MEDICATIONS     Current Outpatient Medications   Medication Sig    aspirin delayed-release 81 mg tablet Take 1 Tab by mouth daily.  clopidogrel (PLAVIX) 75 mg tab Take 1 Tab by mouth daily.  pantoprazole (PROTONIX) 40 mg tablet Take 1 Tab by mouth daily.  dilTIAZem CD (CARDIZEM CD) 240 mg ER capsule Take 1 Cap by mouth daily.  levothyroxine (SYNTHROID) 75 mcg tablet Take 100 mcg by mouth Daily (before breakfast). No current facility-administered medications for this visit. I have reviewed the nurses notes, vitals, problem list, allergy list, medical history, family, social history and medications. REVIEW OF SYMPTOMS      General: Pt denies excessive weight gain or loss. Pt is able to conduct ADL's  HEENT: Denies blurred vision, headaches, hearing loss, epistaxis and difficulty swallowing. Respiratory: Denies cough, congestion, shortness of breath, MARY, wheezing or stridor. Cardiovascular: Denies precordial pain, palpitations, edema or PND  Gastrointestinal: Denies poor appetite, indigestion, abdominal pain or blood in stool  Genitourinary: Denies hematuria, dysuria, increased urinary frequency  Musculoskeletal: Denies joint pain or swelling from muscles or joints  Neurologic: Denies tremor, paresthesias, headache, or sensory motor disturbance  Psychiatric: Denies confusion, insomnia, depression  Integumentray: Denies rash, itching or ulcers. Hematologic: Denies easy bruising, bleeding       PHYSICAL EXAMINATION      Vitals:    19 0955   BP: 120/78   Pulse: (!) 56   Resp: 16   SpO2: 95%   Weight: 145 lb (65.8 kg)   Height: 5' 4\" (1.626 m)     General: Well developed, in no acute distress.   HEENT: No jaundice, oral mucosa moist, no oral ulcers  Neck: Supple, no stiffness, no lymphadenopathy, supple  Heart:  Normal S1/S2 negative S3 or S4. Regular, no murmur, gallop or rub, no jugular venous distention  Respiratory: Clear bilaterally x 4, no wheezing or rales  Abdomen:   Soft, non-tender, bowel sounds are active.   Extremities:  No edema, normal cap refill, no cyanosis. Musculoskeletal: No clubbing, no deformities  Neuro: A&Ox3, speech clear, gait stable, cooperative, no focal neurologic deficits  Skin: Skin color is normal. No rashes or lesions. Non diaphoretic, moist.  Vascular: 2+ pulses symmetric in all extremities       DIAGNOSTIC DATA      EKG: sinus bradycardia       LABORATORY DATA      Lab Results   Component Value Date/Time    WBC 7.6 07/18/2019 04:14 AM    HGB 11.2 (L) 07/18/2019 04:14 AM    HCT 36.0 07/18/2019 04:14 AM    PLATELET 061 30/01/6783 04:14 AM    MCV 90.0 07/18/2019 04:14 AM      Lab Results   Component Value Date/Time    Sodium 140 07/18/2019 04:20 AM    Potassium 3.8 07/18/2019 04:20 AM    Chloride 109 (H) 07/18/2019 04:20 AM    CO2 25 07/18/2019 04:20 AM    Anion gap 6 07/18/2019 04:20 AM    Glucose 100 07/18/2019 04:20 AM    BUN 16 07/18/2019 04:20 AM    Creatinine 0.93 07/18/2019 04:20 AM    BUN/Creatinine ratio 17 07/18/2019 04:20 AM    GFR est AA >60 07/18/2019 04:20 AM    GFR est non-AA 59 (L) 07/18/2019 04:20 AM    Calcium 8.7 07/18/2019 04:20 AM    Bilirubin, total 0.3 07/11/2019 10:45 AM    AST (SGOT) 14 (L) 07/11/2019 10:45 AM    Alk. phosphatase 82 07/11/2019 10:45 AM    Protein, total 6.6 07/11/2019 10:45 AM    Albumin 3.5 07/11/2019 10:45 AM    Globulin 3.1 07/11/2019 10:45 AM    A-G Ratio 1.1 07/11/2019 10:45 AM    ALT (SGPT) 14 07/11/2019 10:45 AM           ASSESSMENT         1. Atrial fibrillation                                         F. Paroxysmal                        Q. PVI 2015                        BLANCA HAJI 2                        AMY WATCHAPARNA 7/18/19  2. Severe anemia  3. Hypothyroidism  4. Premature ventricular contractions                        A.  Symptomatic  5. Diverticulosis        PLAN     Will obtain CBC, CMP and TSH. Suspect symptoms are from her thyroid replacement. Will forward to PCP should levels be abnormal.  Follow up as planned or sooner for increased symptoms. FOLLOW-UP   6 months. Thank you, Orquidea Doshi, NP for allowing me to participate in the care of this extraordinarily pleasant female. Please do not hesitate to contact me for further questions/concerns.        Romulo Cabrera NP

## 2019-09-09 NOTE — TELEPHONE ENCOUNTER
Per KALYN Salgado NP \"She can come in for an EKG but if her heartrate is racing, most likely going to send to ER. If she'd prefer to just go to ER that's fine. \"    Spoke to pt, she would prefer to come in the office to have an EKG. Scheduled with Paulie Lehman NP at 10:30 am today.

## 2019-09-11 ENCOUNTER — TELEPHONE (OUTPATIENT)
Dept: CARDIOLOGY CLINIC | Age: 74
End: 2019-09-11

## 2019-09-12 NOTE — TELEPHONE ENCOUNTER
Labs received from outside location. Abnormal TSH. Results faxed to PCP for treatment. Abnormal RBC, Hemoglobin, Hematocrit. Hx of anemia. Faxed to patient's hematologist on file per request.  Results pending Keely/Celeste review. Understanding expressed.

## 2019-10-10 ENCOUNTER — TELEPHONE (OUTPATIENT)
Dept: CARDIOLOGY CLINIC | Age: 74
End: 2019-10-10

## 2019-10-10 NOTE — TELEPHONE ENCOUNTER
Patient states that she is bruising easily and would like to know if she should discontinue her aspirin or plavix. Please advise.      Phone: [phone]

## 2019-10-10 NOTE — TELEPHONE ENCOUNTER
Returned call, ID verified using two patient identifiers. Patient states she has \"pretty severe\" bruising on her arms. She wants to know if she can stop either ASA or Plavix. Advised patient that per KALYN Yarbrough NP \"  It is not recommended. Ideally, patient's should stay on ASA/Plavix 6 months after MARIA L. MARIA L was performed on 9/6/19. Patient verbalizes understanding of all information. She will notify our office with any further questions or concerns.

## 2019-11-18 ENCOUNTER — HOSPITAL ENCOUNTER (OUTPATIENT)
Dept: CT IMAGING | Age: 74
Discharge: HOME | End: 2019-11-18
Attending: NURSE PRACTITIONER
Payer: MEDICARE

## 2019-11-18 DIAGNOSIS — R06.02 SHORTNESS OF BREATH: ICD-10-CM

## 2019-11-18 PROCEDURE — 71250 CT THORAX DX C-: CPT

## 2019-12-03 ENCOUNTER — HOSPITAL ENCOUNTER (OUTPATIENT)
Age: 74
Setting detail: OUTPATIENT SURGERY
Discharge: HOME | End: 2019-12-03
Attending: SPECIALIST | Admitting: SPECIALIST
Payer: MEDICARE

## 2019-12-03 ENCOUNTER — ANESTHESIA (OUTPATIENT)
Dept: ENDOSCOPY | Age: 74
End: 2019-12-03
Payer: MEDICARE

## 2019-12-03 ENCOUNTER — ANESTHESIA EVENT (OUTPATIENT)
Dept: ENDOSCOPY | Age: 74
End: 2019-12-03
Payer: MEDICARE

## 2019-12-03 VITALS
TEMPERATURE: 97.5 F | SYSTOLIC BLOOD PRESSURE: 116 MMHG | HEIGHT: 65 IN | DIASTOLIC BLOOD PRESSURE: 58 MMHG | RESPIRATION RATE: 20 BRPM | HEART RATE: 56 BPM | WEIGHT: 144.4 LBS | OXYGEN SATURATION: 100 % | BODY MASS INDEX: 24.06 KG/M2

## 2019-12-03 PROCEDURE — 77030022875 HC PRB AR PLSM COAG ERBE -C: Performed by: SPECIALIST

## 2019-12-03 PROCEDURE — 76060000031 HC ANESTHESIA FIRST 0.5 HR: Performed by: SPECIALIST

## 2019-12-03 PROCEDURE — 76040000019: Performed by: SPECIALIST

## 2019-12-03 PROCEDURE — 74011250637 HC RX REV CODE- 250/637: Performed by: PHYSICIAN ASSISTANT

## 2019-12-03 PROCEDURE — 74011000250 HC RX REV CODE- 250: Performed by: NURSE ANESTHETIST, CERTIFIED REGISTERED

## 2019-12-03 PROCEDURE — 74011250636 HC RX REV CODE- 250/636: Performed by: NURSE ANESTHETIST, CERTIFIED REGISTERED

## 2019-12-03 RX ORDER — PROPOFOL 10 MG/ML
INJECTION, EMULSION INTRAVENOUS
Status: DISCONTINUED | OUTPATIENT
Start: 2019-12-03 | End: 2019-12-03 | Stop reason: HOSPADM

## 2019-12-03 RX ORDER — PROPOFOL 10 MG/ML
INJECTION, EMULSION INTRAVENOUS AS NEEDED
Status: DISCONTINUED | OUTPATIENT
Start: 2019-12-03 | End: 2019-12-03 | Stop reason: HOSPADM

## 2019-12-03 RX ORDER — MIDAZOLAM HYDROCHLORIDE 1 MG/ML
.25-5 INJECTION, SOLUTION INTRAMUSCULAR; INTRAVENOUS AS NEEDED
Status: DISCONTINUED | OUTPATIENT
Start: 2019-12-03 | End: 2019-12-03 | Stop reason: HOSPADM

## 2019-12-03 RX ORDER — EPINEPHRINE 0.1 MG/ML
1 INJECTION INTRACARDIAC; INTRAVENOUS
Status: DISCONTINUED | OUTPATIENT
Start: 2019-12-03 | End: 2019-12-03 | Stop reason: HOSPADM

## 2019-12-03 RX ORDER — SODIUM CHLORIDE 9 MG/ML
INJECTION, SOLUTION INTRAVENOUS
Status: DISCONTINUED | OUTPATIENT
Start: 2019-12-03 | End: 2019-12-03 | Stop reason: HOSPADM

## 2019-12-03 RX ORDER — ATROPINE SULFATE 0.1 MG/ML
0.5 INJECTION INTRAVENOUS
Status: DISCONTINUED | OUTPATIENT
Start: 2019-12-03 | End: 2019-12-03 | Stop reason: HOSPADM

## 2019-12-03 RX ORDER — SODIUM CHLORIDE 9 MG/ML
50 INJECTION, SOLUTION INTRAVENOUS CONTINUOUS
Status: DISCONTINUED | OUTPATIENT
Start: 2019-12-03 | End: 2019-12-03 | Stop reason: HOSPADM

## 2019-12-03 RX ORDER — LIDOCAINE HYDROCHLORIDE 20 MG/ML
INJECTION, SOLUTION EPIDURAL; INFILTRATION; INTRACAUDAL; PERINEURAL AS NEEDED
Status: DISCONTINUED | OUTPATIENT
Start: 2019-12-03 | End: 2019-12-03 | Stop reason: HOSPADM

## 2019-12-03 RX ORDER — FENTANYL CITRATE 50 UG/ML
25 INJECTION, SOLUTION INTRAMUSCULAR; INTRAVENOUS AS NEEDED
Status: DISCONTINUED | OUTPATIENT
Start: 2019-12-03 | End: 2019-12-03 | Stop reason: HOSPADM

## 2019-12-03 RX ORDER — DEXTROMETHORPHAN/PSEUDOEPHED 2.5-7.5/.8
1.2 DROPS ORAL
Status: DISCONTINUED | OUTPATIENT
Start: 2019-12-03 | End: 2019-12-03 | Stop reason: HOSPADM

## 2019-12-03 RX ORDER — FLUMAZENIL 0.1 MG/ML
0.2 INJECTION INTRAVENOUS
Status: DISCONTINUED | OUTPATIENT
Start: 2019-12-03 | End: 2019-12-03 | Stop reason: HOSPADM

## 2019-12-03 RX ORDER — NALOXONE HYDROCHLORIDE 0.4 MG/ML
0.4 INJECTION, SOLUTION INTRAMUSCULAR; INTRAVENOUS; SUBCUTANEOUS
Status: DISCONTINUED | OUTPATIENT
Start: 2019-12-03 | End: 2019-12-03 | Stop reason: HOSPADM

## 2019-12-03 RX ADMIN — SODIUM CHLORIDE: 9 INJECTION, SOLUTION INTRAVENOUS at 11:40

## 2019-12-03 RX ADMIN — SIMETHICONE 80 MG: 20 SUSPENSION/ DROPS ORAL at 12:01

## 2019-12-03 RX ADMIN — PROPOFOL 140 MCG/KG/MIN: 10 INJECTION, EMULSION INTRAVENOUS at 11:46

## 2019-12-03 RX ADMIN — LIDOCAINE HYDROCHLORIDE 40 MG: 20 INJECTION, SOLUTION INTRAVENOUS at 11:46

## 2019-12-03 RX ADMIN — PROPOFOL 60 MG: 10 INJECTION, EMULSION INTRAVENOUS at 11:46

## 2019-12-03 NOTE — PROCEDURES
[address] [facility] AMY Robles MD  [phone]      December 3, 2019    Esophagogastroduodenoscopy & Colonoscopy Procedure Note  Maco Rivers  : 1945  Shahana Sterling Medical Record Number: [id number]      Indications:    Iron deficiency anemia Iron deficiency anemia   Referring Physician:  Aleisha Merida NP  Anesthesia/Sedation: Conscious Sedation/Moderate Sedation/MAC  Endoscopist:  Dr. Aaron Bella  Complications:  None  Estimated Blood Loss:  None    Permit:  The indications, risks, benefits and alternatives were reviewed with the patient or their decision maker who was provided an opportunity to ask questions and all questions were answered. The specific risks of esophagogastroduodenoscopy with conscious sedation were reviewed, including but not limited to anesthetic complication, bleeding, adverse drug reaction, missed lesion, infection, IV site reactions, and intestinal perforation which would lead to the need for surgical repair. Alternatives to EGD and colonoscopy including radiographic imaging, observation without testing, or laboratory testing were reviewed as well as the limitations of those alternatives discussed. After considering the options and having all their questions answered, the patient or their decision maker provided both verbal and written consent to proceed. -----------EGD------------   Procedure in Detail:  After obtaining informed consent, positioning of the patient in the left lateral decubitus position, and conduction of a pre-procedure pause or \"time out\" the endoscope was introduced into the mouth and advanced to the duodenum. A careful inspection was made, and findings or interventions are described below. Findings:   Esophagus:normal  Stomach: AVM of stomach x3, ablated with APC on standard gastric settings.   Duodenum/jejunum: normal     I inserted to the depths of the upper GI endoscope carefully looking for AVM in the small intestine with none being identified. NO blood or bleeding or hematin is noted in the upper GI tract.          ----------Colonoscopy-----------    Procedure in Detail:  After obtaining informed consent, positioning of the patient in the left lateral decubitus position, and conduction of a pre-procedure pause or \"time out\" the endoscope was introduced into the anus and advanced to the cecum, which was identified by the ileocecal valve and appendiceal orifice. The quality of the colonic preparation was adequate. A careful inspection was made as the colonoscope was withdrawn, findings and interventions are described below. Findings: There is diverticulosis in the sigmoid colon without complications such as bleeding, inflammatory change, or luminal narrowing. In the rectum, medium internal hemorrhoids are noted without bleeding.      ------------------------------  Specimens:    See above    Complications:   None; patient tolerated the procedure well. Impressions:  EGD:  AVM stomach, ablated. Colonoscopy: No colonic AVM seen. Diverticulosis and hemorhoids noted. Recommendations:      - Would see if today's efforts lead to resolution or improvement in her anemia. Thank you for entrusting me with this patient's care. Please do not hesitate to contact me with any questions or if I can be of assistance with any of your other patients' GI needs. Signed By: Silas Bourgeois MD                        December 3, 2019    Surgical assistant none. Implants none unless specified.

## 2019-12-03 NOTE — PROGRESS NOTES
Meenu Means  1945  [id number]    Situation:  Verbal report received from: Heavenly Zhu RN   Procedure: Procedure(s):  ESOPHAGOGASTRODUODENOSCOPY (EGD), COLONOSCOPY  COLONOSCOPY ( No Info to )  ENDOSCOPIC ARGON PLASMA COAGULATION    Background:    Preoperative diagnosis: ANEMIA, OCCULT BLOOD IN STOOLS  Postoperative diagnosis: APC Stomach    :  Dr. Yamileth Huff  Assistant(s): Endoscopy RN-1: Cosmo Marsh RN    Specimens: * No specimens in log *  H. Pylori  no    Assessment:  Intra-procedure medications     Anesthesia gave intra-procedure sedation and medications, see anesthesia flow sheet yes    Intravenous fluids: NS@ KVO     Vital signs stable   yes    Abdominal assessment: round and soft   yes    Recommendation:  Discharge patient per MD order  yes.   Return to floor  outpatient  Family or Friend   Friend   Permission to share finding with family or friend no

## 2019-12-03 NOTE — DISCHARGE INSTRUCTIONS
[address] [facility] AMY Reyes MD  [phone]      December 3, 2019    Nely Morillo  YOB: 1945    COLONOSCOPY DISCHARGE INSTRUCTIONS    If there is redness at IV site you should apply warm compress to area. If redness or soreness persist contact Dr. Chasity Reyes' or your primary care doctor. There may be a slight amount of blood passed from the rectum. Gaseous discomfort may develop, but walking, belching will help relieve this. You may not operate a vehicle for 12 hours  You may not operate machinery or dangerous appliances for rest of today  You may not drink alcoholic beverages for 12 hours  Avoid making any critical decisions for 24 hours    DIET:  You may resume your normal diet, but some patients find that heavy or large meals may lead to indigestion or vomiting. I suggest a light meal as first food intake. MEDICATIONS:  The use of some over-the-counter pain medication may lead to bleeding after colon biopsies or polyp removal.  Tylenol (also called acetaminophen) is safe to take even if you have had colonoscopy with polyp removal.  Based on the procedure you had today you may not safely take aspirin or aspirin-like products for the next ten (10) days. Remember that Tylenol (also called acetaminophen) is safe to take after colonoscopy even if you have had biopsies or polyps removed. ACTIVITY:  You may resume your normal household activities, but it is recommended that you spend the remainder of the day resting -  avoid any strenuous activity. CALL DR. Reji Wright' OFFICE IF:  Increasing pain, nausea, vomiting  Abdominal distension (swelling)  Significant new or increased bleeding (oral or rectal)  Fever/Chills  Chest pain/shortness of breath                       Additional instructions:   No aspirin 10 days. We found 3 AVM and ablated them today. We'll see if this leads to improvement in the anemia. It was an honor to be your doctor today. Please let me or my office staff know if you have any feedback about today's procedure. Rui Kowalski MD    Colonoscopy saves lives, and can prevent colon cancer. Everyone aged 48 or older needs colonoscopy.   Tell your family and friends: get the test!

## 2019-12-03 NOTE — INTERVAL H&P NOTE
Pre-Endoscopy H&P Update  Chief complaint/HPI/ROS:  The indication for the procedure, the patient's history and the patient's current medications are reviewed prior to the procedure and that data is reported on the H&P to which this document is attached. Any significant complaints with regard to organ systems will be noted, and if not mentioned then a review of systems is not contributory.   Past Medical History:   Diagnosis Date    Anemia     Atrial fibrillation ([address]     s/p pulm vein isolation     Atrial fibrillation with rapid ventricular response ([address] 2017    Basal cell carcinoma     Depression     GERD (gastroesophageal reflux disease)     Hematuria 7/15/2019    Hypothyroidism     Ill-defined condition     bleeding in liver while on Xarelto    Tremor       Past Surgical History:   Procedure Laterality Date    CARDIAC SURG PROCEDURE UNLIST      cardioversionx4    CARDIAC SURG PROCEDURE UNLIST      ABLATION     COLONOSCOPY N/A 2017    COLONOSCOPY performed by Zhang Serrano MD at [address] [place] [address] HX AFIB ABLATION  2016    HX CATARACT REMOVAL Bilateral     HX CHOLECYSTECTOMY  2016    HX DILATION AND CURETTAGE      HX GI      colonoscopy    HX ORTHOPAEDIC Bilateral     arthroscopic knee surgery    HX POLYPECTOMY      HX TONSILLECTOMY      HX TUBAL LIGATION       Social   Social History     Tobacco Use    Smoking status: Former Smoker     Packs/day: 0.50     Years: 50.00     Pack years: 25.00     Last attempt to quit: 2019     Years since quittin.3    Smokeless tobacco: Never Used   Substance Use Topics    Alcohol use: Yes     Frequency: Never     Comment: OCCA      Family History   Problem Relation Age of Onset    Heart Disease Father     Cancer Paternal Aunt     Anesth Problems Neg Hx       No Known Allergies   Prior to Admission Medications   Prescriptions Last Dose Informant Patient Reported? Taking?   aspirin delayed-release 81 mg tablet 12/3/2019 at Unknown time  No Yes   Sig: Take 1 Tab by mouth daily. clopidogrel (PLAVIX) 75 mg tab 12/3/2019 at Unknown time  No Yes   Sig: Take 1 Tab by mouth daily. dilTIAZem CD (CARDIZEM CD) 240 mg ER capsule 12/3/2019 at Unknown time  No Yes   Sig: Take 1 Cap by mouth daily. levothyroxine (SYNTHROID) 75 mcg tablet 12/3/2019 at Unknown time  Yes Yes   Sig: Take 100 mcg by mouth Daily (before breakfast). pantoprazole (PROTONIX) 40 mg tablet 12/3/2019 at Unknown time  No Yes   Sig: Take 1 Tab by mouth daily. vortioxetine (TRINTELLIX) 10 mg tablet   Yes Yes   Sig: Take 10 mg by mouth daily. Facility-Administered Medications: None       PHYSICAL EXAM:  The patient is examined immediately prior to the procedure. Visit Vitals  /57   Pulse 61   Temp 98.7 °F (37.1 °C)   Resp 16   Ht 5' 5\" (1.651 m)   Wt 65.5 kg (144 lb 6.4 oz)   SpO2 100%   BMI 24.03 kg/m²     Gen: Appears comfortable, no distress. Pulm: comfortable respirations with no abnormal audible breath sounds  HEART: well perfused, no abnormal audible heart sounds  GI: abdomen flat. PLAN:  Informed consent discussion held, patient afforded an opportunity to ask questions and all questions answered. After being advised of the risks, benefits, and alternatives, the patient requested that we proceed and indicated so on a written consent form. Will proceed with procedure as planned.   Negrita Ratliff MD

## 2019-12-03 NOTE — ANESTHESIA PREPROCEDURE EVALUATION
Relevant Problems   No relevant active problems       Anesthetic History   No history of anesthetic complications            Review of Systems / Medical History  Patient summary reviewed and pertinent labs reviewed    Pulmonary  Within defined limits                 Neuro/Psych         Psychiatric history (Depression)     Cardiovascular            Dysrhythmias : atrial fibrillation      Exercise tolerance: >4 METS  Comments: Still on Plavix. Has had an atrial ablation (Watchman) procedure.    GI/Hepatic/Renal     GERD           Endo/Other      Hypothyroidism: well controlled       Other Findings              Physical Exam    Airway  Mallampati: II    Neck ROM: normal range of motion   Mouth opening: Normal     Cardiovascular    Rhythm: regular  Rate: normal         Dental    Dentition: Bridges     Pulmonary  Breath sounds clear to auscultation               Abdominal  GI exam deferred       Other Findings            Anesthetic Plan    ASA: 3  Anesthesia type: MAC          Induction: Intravenous  Anesthetic plan and risks discussed with: Patient

## 2019-12-03 NOTE — H&P
Date: 2019 3:00 PM   Patient Name: Joselyn Richard   Account #: [id number]    Gender: Female    (age): 1945 (66)       Provider:     Guanakito Murray. 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 Recently a stool test was positive for occult blood. She has discontinued the use of anticoagulant medication having completed the watchman procedure. She currently takes Plavix daily. She does not see visible blood in stool. She complains of sleepiness, a tremor in her hands, and occasional wheezing. She has seen a pulmonologist and a sleep study is scheduled. We discussed the indications risks benefits alternatives to repeat diagnostic and potentially therapeutic upper and lower GI endoscopy to ensure the diagnosis of intestinal AVMs is accurate and to cauterize as many AVMs as we can find using traditional endoscopy. ? 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 Recently a stool test was positive for occult blood. She has discontinued the use of anticoagulant medication having completed the watchman procedure. She currently takes Plavix daily. She does not see visible blood in stool. She complains of sleepiness, a tremor in her hands, and occasional wheezing. She has seen a pulmonologist and a sleep study is scheduled.   We discussed the indications risks benefits alternatives to repeat diagnostic and potentially therapeutic upper and lower GI endoscopy to ensure the diagnosis of intestinal AVMs is accurate and to cauterize as many AVMs as we can find using traditional endoscopy. ? Past Medical History      Medical Conditions: Anemia  Atrial Fibrillation  Hemorrhoids  Hypothyroidism  Thyroid disease   Surgical Procedures: Watchman, 8/2019  Knee Surgery ( Bilat )  Cholecystectomy   Dx Studies: Colonoscopy  CT Scan  Endo Posting, 8/25/2017  MRI  Pillcam Intake, 9/21/2017   Medications: aspirin 81 mg Take tablet by mouth once a day  clopidogrel 75 mg TK 1 T PO D  diltiazem HCl 240 mg TK 1 C PO D  levothyroxine 112 mcg TK 1 T PO ONCE D QAM ON AN EMPTY STOMACH  pantoprazole 40 mg TK 1 T PO D  Trintellix 10 mg Take 1 tablet by mouth once a day   Allergies: Patient has no known allergies or drug allergies   Immunizations: Flu vaccine, 2018, 10/2019      Social History      Alcohol: None   Tobacco: Former smokerOther, quit 9/2019. Drugs: None   Exercise: Exercise less than 3 times a week. Caffeine: Daily. Marital Status:          Occupation:               Family History No history of Colon Cancer, Colon Polyps, Esophogeal Cancer, GI Cancers, IBD (Crohn's or UC), Liver disease  Daughter: Diagnosed with Breast Cancer;       Review of Systems:   Cardiovascular: Denies chest pain, irregular heart beat, palpitations, peripheral edema, syncope, Sweats. Constitutional: Presents suffers from fatigue. Denies fever, loss of appetite, weight gain, weight loss. ENMT: Denies nose bleeds, sore throat, hearing loss. Endocrine: Presents suffers from excessive thirst. Denies heat intolerance. Eyes: Denies loss of vision. Gastrointestinal: Presents suffers from change in bowel habits, nausea. Denies abdominal pain, abdominal swelling, constipation, diarrhea, Bloating/gas, heartburn, jaundice, rectal bleeding, stomach cramps, vomiting, dysphagia, rectal pain, Stool incontinence, hematemesis. Genitourinary: Presents suffers from frequent urination.  Denies dark urine, dysuria, hematuria, incontinence. Hematologic/Lymphatic: Presents suffers from easy bruising, prolonged bleeding. .    Integumentary: Denies itching, rashes, sun sensitivity. Musculoskeletal: Denies arthritis, back pain, gout, joint pain, muscle weakness, stiffness. Neurological: Presents suffers from dizziness. Denies fainting, frequent headaches, memory loss. Psychiatric: Presents suffers from anxiety, depression. Denies difficulty sleeping, hallucinations, nervousness, panic attacks, paranoia. Respiratory: Denies cough, dyspnea, wheezing. Vital Signs:   BP  (mmHg)  Pulse  (ppm) Weight (lbs/oz) Height (ft/in) BMI Temp   117/71 63 149 / 2 5 / 5 24.81 98 (F)      Physical Exam:   Constitutional:      Appearance: No distress, appears comfortable. Communication: Understands/receives spoken information. Skin:      Inspection: No rash, no jaundice. Head/face: Inspection: Normacephalic, atraumatic. Eyes:      Conjunctivae/lids: Normal.   Pupils/irises: Pupils equal, round and normal.   ENMT:      External: Normal.   Hearing: Normal.   Neck:      Neck: Normal appearance, trachea midline. Jugular veins: No JVD noted. Respiratory:      Effort: Normal respiratory effort, comfortable, speaks in complete sentences. Musculoskeletal:      Gait/station: normal.   Digits/nails: Normal, no spooning of nails, clubbing, or splinter hemorrhages, no clubbing, cyanosis, petechiae or other inflammatory conditions. Psychiatric:      Judgment/insight: Normal, normal judgement, normal insight. Orientation: oriented to time, space and person. Lab Results: No Electronic Results      Impressions: Anemia, unspecified  Arteriovenous malformation of digestive system vessel  Occult blood in stools? ? Anemia, unspecified? ?  ? ? Arteriovenous malformation of digestive system vessel? ?  ? ? Occult blood in stools? Assessment: ?         Plan: Upper Endoscopy  Colonoscopy - HOLD Plavix 5 days prior. ? ?Upper Endoscopy? ?  ? ? Colonoscopy? - HOLD Plavix 5 days prior. Risk & Medical Necessity: The patient requires Moderate to High Severity care for this visit. Diagnosis and management options are Minimal. The amount of data reviewed and/or ordered is Minimal/None. The level of risk is Minimal.           Notes:              Ranjan Sinha. Adi Holbrook MD     Electronically signed on 2019 3:30:06 PM by Ranjan Sinha.  Britney Robertson, MRN [id number],  1945 Follow Up, Friday, 2019                                                                                                                                                        New     Modify          Delete     Delete all     Edit Wording          Sign     [email or web address]

## 2019-12-03 NOTE — ANESTHESIA POSTPROCEDURE EVALUATION
Procedure(s):  ESOPHAGOGASTRODUODENOSCOPY (EGD), COLONOSCOPY  COLONOSCOPY ( No Info to )  ENDOSCOPIC ARGON PLASMA COAGULATION. MAC    Anesthesia Post Evaluation      Multimodal analgesia: multimodal analgesia not used between 6 hours prior to anesthesia start to PACU discharge  Patient location during evaluation: PACU  Patient participation: complete - patient participated  Level of consciousness: awake and alert  Pain score: 0  Pain management: satisfactory to patient  Airway patency: patent  Anesthetic complications: no  Cardiovascular status: acceptable  Respiratory status: acceptable  Hydration status: acceptable  Post anesthesia nausea and vomiting:  none      Vitals Value Taken Time   /58 12/3/2019 12:41 PM   Temp 36.4 °C (97.5 °F) 12/3/2019 12:17 PM   Pulse 53 12/3/2019 12:44 PM   Resp 16 12/3/2019 12:44 PM   SpO2 100 % 12/3/2019 12:44 PM   Vitals shown include unvalidated device data.

## 2019-12-10 ENCOUNTER — TELEPHONE (OUTPATIENT)
Dept: CARDIOLOGY CLINIC | Age: 74
End: 2019-12-10

## 2019-12-10 DIAGNOSIS — I48.91 ATRIAL FIBRILLATION, UNSPECIFIED TYPE (HCC): Primary | ICD-10-CM

## 2019-12-10 NOTE — TELEPHONE ENCOUNTER
Returned call to patient, ID verified using two patient identifiers. Patient states she has been having issues with generalized bruising, but in the last two days she has  noticed a severe bruise on her left breast and arm, with slight tenderness to touch. Patient denies any injury to area. States she is taking her Plavix but her ASA has been on hold for 5 days for a procedure she had, and she is to hold it for 5 more days. Informed patient that I would relay information to Dr. Justina Lerner for any new recommendations. Patient agreeable to this plan.

## 2019-12-10 NOTE — TELEPHONE ENCOUNTER
Patient states she is still severely bruising which she believes is from her medication. Please advise.     Phone: [phone]

## 2019-12-11 NOTE — TELEPHONE ENCOUNTER
Called patient, ID verified using two patient identifiers. Advised patient per Dr. Emma Cruz she is to continue holding her ASA due to the severe bruising and that he wants to get a CBC done. I will mail her the lab slip to her home address. Patient verbalizes her understanding and is agreeable to this plan.

## 2019-12-16 ENCOUNTER — TELEPHONE (OUTPATIENT)
Dept: CARDIOLOGY CLINIC | Age: 74
End: 2019-12-16

## 2019-12-16 NOTE — TELEPHONE ENCOUNTER
Received call from patient, ID verified using two patient identifiers. Patient states that  the top of her feet are swollen and she continues to have severe bruising. She denies any worse SOB than usual, no increase in weight, no CP, dizziness or palpitations. She did have a 2 hour plane trip recently, denies any increase in dietary sodium. She is requesting an appointment with Dr. Nakia Pimentel. Appointment setup for Wednesday 12/18/19 @ 10:40 am [address] @ [facility]. Advised patient to elevate feet and legs above the level of her heart as much as possible. And to seek immediate medical attention if symptoms worsen or new symptoms arise. Patient agreeable to this plan and verbalizes understanding of all information.

## 2019-12-17 NOTE — PROGRESS NOTES
HISTORY OF PRESENTING ILLNESS      Cynthia Luque is a 66 y.o. female with paroxysmal atrial fibrillation status post pulmonary vein isolation in 2015 in [place], hypothyroidism and severe anemia for which she has undergone multiple iron transfusions. Guille Ochoa was previously on rivaroxaban for CVA risk reduction of her atrial fibrillation however this was discontinued. Nuclear stress test in 5/2017 demonstrated preserved LV function without ischemia. She underwent holter monitor which demonstrated 20% PVC burden and failed to reveal atrial fibrillation. She was offered Flecainide 50mg BID however she was hesitant to start this. She was seen by hematologist who felt that her anemia was not driving her symptoms.  We started a trial of low-dose Toprol which has been ineffective in improving her symptoms and was discontinued. She underwent Watchman implantation and experienced AF with RVR post procedure. Echocardiogram showed trivial effusion only. She was discharged on amiodarone and subsequently converted to SR. She underwent monitoring which failed to reveal recurrence of AF while off amiodarone. She reports easy bruising while on plavix/aspirin. Also has had left lower extremity ultrasound.         ACTIVE PROBLEM LIST     Patient Active Problem List    Diagnosis Date Noted    Presence of Watchman left atrial appendage closure device 07/16/2019     Priority: 1 - One    Atrial fibrillation ([address] 07/16/2019    Hematuria 07/15/2019    Other hyperlipidemia 02/02/2019    Symptomatic anemia 08/23/2017    Hypothyroid 08/23/2017    Cigarette nicotine dependence without complication 79/53/0788    Dental infection 05/20/2017    Calculus of gallbladder with chronic cholecystitis without obstruction 08/15/2016    S/P ablation of atrial fibrillation 08/15/2016    Hashimoto's thyroiditis 08/15/2016    Irritable bowel syndrome with diarrhea 08/15/2016    Paroxysmal A-fib ([address] 09/24/2015           PAST MEDICAL HISTORY     Past Medical History:   Diagnosis Date    Anemia     Atrial fibrillation (HCC)     s/p pulm vein isolation 2015    Atrial fibrillation with rapid ventricular response [address] [address] [facility] 2017    Basal cell carcinoma     Depression     GERD (gastroesophageal reflux disease)     Hematuria 7/15/2019    Hypothyroidism     Ill-defined condition     bleeding in liver while on Xarelto    Tremor            PAST SURGICAL HISTORY     Past Surgical History:   Procedure Laterality Date    CARDIAC SURG PROCEDURE UNLIST  2015    cardioversionx4    CARDIAC SURG PROCEDURE UNLIST  2014    ABLATION     COLONOSCOPY N/A 2017    COLONOSCOPY performed by Michael Kahn MD at [address] [facility] COLONOSCOPY N/A 12/3/2019    COLONOSCOPY ( No Info to ) performed by Narcisa Cuellar MD at [address]    HX AFIB ABLATION      HX CATARACT REMOVAL Bilateral     HX CHOLECYSTECTOMY  2016    HX DILATION AND CURETTAGE      HX GI      colonoscopy    HX ORTHOPAEDIC Bilateral     arthroscopic knee surgery    HX POLYPECTOMY      HX TONSILLECTOMY      HX TUBAL LIGATION            ALLERGIES     No Known Allergies       FAMILY HISTORY     Family History   Problem Relation Age of Onset    Heart Disease Father     Cancer Paternal Aunt     Anesth Problems Neg Hx     negative for cardiac disease       SOCIAL HISTORY     Social History     Socioeconomic History    Marital status: SINGLE     Spouse name: Not on file    Number of children: Not on file    Years of education: Not on file    Highest education level: Not on file   Tobacco Use    Smoking status: Former Smoker     Packs/day: 0.50     Years: 50.00     Pack years: 25.00     Last attempt to quit: 2019     Years since quittin.3    Smokeless tobacco: Never Used   Substance and Sexual Activity    Alcohol use: Yes     Frequency: Never     Comment: OCCA    Drug use: No    Sexual activity: Not Currently     Partners: Male         MEDICATIONS     Current Outpatient Medications   Medication Sig    vortioxetine (TRINTELLIX) 10 mg tablet Take 10 mg by mouth daily.  aspirin delayed-release 81 mg tablet Take 1 Tab by mouth daily.  clopidogrel (PLAVIX) 75 mg tab Take 1 Tab by mouth daily.  pantoprazole (PROTONIX) 40 mg tablet Take 1 Tab by mouth daily.  dilTIAZem CD (CARDIZEM CD) 240 mg ER capsule Take 1 Cap by mouth daily.  levothyroxine (SYNTHROID) 75 mcg tablet Take 100 mcg by mouth Daily (before breakfast). No current facility-administered medications for this visit. I have reviewed the nurses notes, vitals, problem list, allergy list, medical history, family, social history and medications. REVIEW OF SYMPTOMS      General: Pt denies excessive weight gain or loss. Pt is able to conduct ADL's  HEENT: Denies blurred vision, headaches, hearing loss, epistaxis and difficulty swallowing. Respiratory: Denies cough, congestion, shortness of breath, MARY, wheezing or stridor. Cardiovascular: Denies precordial pain, palpitations, edema or PND  Gastrointestinal: Denies poor appetite, indigestion, abdominal pain or blood in stool  Genitourinary: Denies hematuria, dysuria, increased urinary frequency  Musculoskeletal: Denies joint pain or swelling from muscles or joints  Neurologic: Denies tremor, paresthesias, headache, or sensory motor disturbance  Psychiatric: Denies confusion, insomnia, depression  Integumentray: Denies rash, itching or ulcers. Hematologic: Denies easy bruising, bleeding       PHYSICAL EXAMINATION      There were no vitals filed for this visit. General: Well developed, in no acute distress. HEENT: No jaundice, oral mucosa moist, no oral ulcers  Neck: Supple, no stiffness, no lymphadenopathy, supple  Heart:  Normal S1/S2 negative S3 or S4.  Regular, no murmur, gallop or rub, no jugular venous distention  Respiratory: Clear bilaterally x 4, no wheezing or rales  Abdomen:   Soft, non-tender, bowel sounds are active.   Extremities:  No edema, normal cap refill, no cyanosis. Musculoskeletal: No clubbing, no deformities  Neuro: A&Ox3, speech clear, gait stable, cooperative, no focal neurologic deficits  Skin: Skin color is normal. No rashes or lesions. Non diaphoretic, moist.  Vascular: 2+ pulses symmetric in all extremities       DIAGNOSTIC DATA      EKG:        LABORATORY DATA      Lab Results   Component Value Date/Time    WBC 7.6 07/18/2019 04:14 AM    HGB 11.2 (L) 07/18/2019 04:14 AM    HCT 36.0 07/18/2019 04:14 AM    PLATELET 946 68/71/7856 04:14 AM    MCV 90.0 07/18/2019 04:14 AM      Lab Results   Component Value Date/Time    Sodium 140 07/18/2019 04:20 AM    Potassium 3.8 07/18/2019 04:20 AM    Chloride 109 (H) 07/18/2019 04:20 AM    CO2 25 07/18/2019 04:20 AM    Anion gap 6 07/18/2019 04:20 AM    Glucose 100 07/18/2019 04:20 AM    BUN 16 07/18/2019 04:20 AM    Creatinine 0.93 07/18/2019 04:20 AM    BUN/Creatinine ratio 17 07/18/2019 04:20 AM    GFR est AA >60 07/18/2019 04:20 AM    GFR est non-AA 59 (L) 07/18/2019 04:20 AM    Calcium 8.7 07/18/2019 04:20 AM    Bilirubin, total 0.3 07/11/2019 10:45 AM    AST (SGOT) 14 (L) 07/11/2019 10:45 AM    Alk. phosphatase 82 07/11/2019 10:45 AM    Protein, total 6.6 07/11/2019 10:45 AM    Albumin 3.5 07/11/2019 10:45 AM    Globulin 3.1 07/11/2019 10:45 AM    A-G Ratio 1.1 07/11/2019 10:45 AM    ALT (SGPT) 14 07/11/2019 10:45 AM           ASSESSMENT      1. Atrial fibrillation                                         O. Paroxysmal                        I. PVI 2015                        C. HASBLED 2                        D. WATCHMAN 7/18/19  2. Severe anemia  3. Hypothyroidism  4. Premature ventricular contractions                        A.  Symptomatic  5. Diverticulosis        PLAN     DC plavix; continue asprin. Obtain venous ultrasound of left lower extremity to exclude presence of DVT.         FOLLOW-UP     1 year      Thank you, Samantha Boston, LORIN for allowing me to participate in the care of this extraordinarily pleasant female. Please do not hesitate to contact me for further questions/concerns.          Obdulio Perez MD  Cardiac Electrophysiology / Cardiology    [address]  [facility], [address]  [phone] / [phone] Fax   [phone] / [phone] Fax

## 2019-12-18 ENCOUNTER — OFFICE VISIT (OUTPATIENT)
Dept: CARDIOLOGY CLINIC | Age: 74
End: 2019-12-18

## 2019-12-18 VITALS
SYSTOLIC BLOOD PRESSURE: 132 MMHG | HEIGHT: 65 IN | OXYGEN SATURATION: 100 % | HEART RATE: 68 BPM | RESPIRATION RATE: 16 BRPM | WEIGHT: 148.4 LBS | BODY MASS INDEX: 24.72 KG/M2 | DIASTOLIC BLOOD PRESSURE: 80 MMHG

## 2019-12-18 DIAGNOSIS — I73.9 PERIPHERAL VASCULAR DISEASE (HCC): ICD-10-CM

## 2019-12-18 DIAGNOSIS — D64.9 SYMPTOMATIC ANEMIA: ICD-10-CM

## 2019-12-18 DIAGNOSIS — Z95.818 PRESENCE OF WATCHMAN LEFT ATRIAL APPENDAGE CLOSURE DEVICE: ICD-10-CM

## 2019-12-18 DIAGNOSIS — I48.91 ATRIAL FIBRILLATION, UNSPECIFIED TYPE (HCC): Primary | ICD-10-CM

## 2019-12-18 DIAGNOSIS — R06.02 SOB (SHORTNESS OF BREATH): ICD-10-CM

## 2019-12-18 NOTE — PROGRESS NOTES
ROOM # 2  Watchman-7/18/19  C/o severe bruising and feet swelling  Visit Vitals  /80 (BP 1 Location: Left arm, BP Patient Position: Sitting)   Pulse 68   Resp 16   Ht 5' 5\" (1.651 m)   Wt 148 lb 6.4 oz (67.3 kg)   SpO2 100%   BMI 24.70 kg/m²

## 2019-12-19 LAB
ERYTHROCYTE [DISTWIDTH] IN BLOOD BY AUTOMATED COUNT: 14.8 % (ref 12.3–15.4)
HCT VFR BLD AUTO: 31.7 % (ref 34–46.6)
HGB BLD-MCNC: 10.5 G/DL (ref 11.1–15.9)
MCH RBC QN AUTO: 29.5 PG (ref 26.6–33)
MCHC RBC AUTO-ENTMCNC: 33.1 G/DL (ref 31.5–35.7)
MCV RBC AUTO: 89 FL (ref 79–97)
PLATELET # BLD AUTO: 332 X10E3/UL (ref 150–450)
RBC # BLD AUTO: 3.56 X10E6/UL (ref 3.77–5.28)
WBC # BLD AUTO: 5.9 X10E3/UL (ref 3.4–10.8)

## 2019-12-19 NOTE — PROGRESS NOTES
Her HgB is slightly dipped. Advise follow up within 3 months with PCP to re-evaluate. Suspect will trend upward now that she is off of Plavix.

## 2019-12-20 ENCOUNTER — TELEPHONE (OUTPATIENT)
Dept: CARDIOLOGY CLINIC | Age: 74
End: 2019-12-20

## 2019-12-20 NOTE — TELEPHONE ENCOUNTER
Called patient, ID verified using two patient identifiers. Informed her that her Hgb has dropped slightly per KALYN Jessica NP and that she would like her to follow up with her PCP in 3 months to revaluate her labs. Patient verbalizes understanding of all information. Lab results have been sent  to patient 's PCP for their review.

## 2019-12-20 NOTE — TELEPHONE ENCOUNTER
----- Message from Odette Plata NP sent at 12/19/2019  9:24 AM EST -----  Her HgB is slightly dipped. Advise follow up within 3 months with PCP to re-evaluate. Suspect will trend upward now that she is off of Plavix.

## 2020-01-03 RX ORDER — PANTOPRAZOLE SODIUM 40 MG/1
40 TABLET, DELAYED RELEASE ORAL DAILY
Qty: 30 TAB | Refills: 3 | Status: SHIPPED | OUTPATIENT
Start: 2020-01-03 | End: 2020-01-22 | Stop reason: ALTCHOICE

## 2020-01-03 NOTE — TELEPHONE ENCOUNTER
Requested Prescriptions     Signed Prescriptions Disp Refills    pantoprazole (PROTONIX) 40 mg tablet 30 Tab 3     Sig: Take 1 Tab by mouth daily. Authorizing Provider: Jose Ramon Henry     Ordering User: Therese Rose     Refill per verbal order Dr. Eliezer Busby

## 2020-01-22 ENCOUNTER — OFFICE VISIT (OUTPATIENT)
Dept: CARDIOLOGY CLINIC | Age: 75
End: 2020-01-22

## 2020-01-22 VITALS
OXYGEN SATURATION: 97 % | WEIGHT: 156 LBS | BODY MASS INDEX: 25.99 KG/M2 | HEIGHT: 65 IN | HEART RATE: 62 BPM | SYSTOLIC BLOOD PRESSURE: 130 MMHG | DIASTOLIC BLOOD PRESSURE: 68 MMHG

## 2020-01-22 DIAGNOSIS — R53.82 CHRONIC FATIGUE: ICD-10-CM

## 2020-01-22 DIAGNOSIS — Z86.79 S/P ABLATION OF ATRIAL FIBRILLATION: ICD-10-CM

## 2020-01-22 DIAGNOSIS — E03.9 HYPOTHYROIDISM, UNSPECIFIED TYPE: ICD-10-CM

## 2020-01-22 DIAGNOSIS — Z95.818 PRESENCE OF WATCHMAN LEFT ATRIAL APPENDAGE CLOSURE DEVICE: ICD-10-CM

## 2020-01-22 DIAGNOSIS — Z98.890 S/P ABLATION OF ATRIAL FIBRILLATION: ICD-10-CM

## 2020-01-22 DIAGNOSIS — I48.91 ATRIAL FIBRILLATION, UNSPECIFIED TYPE (HCC): Primary | ICD-10-CM

## 2020-01-22 NOTE — PROGRESS NOTES
HISTORY OF PRESENTING ILLNESS      Arias Mcclain is a 66 y.o. female with paroxysmal atrial fibrillation status post pulmonary vein isolation in 2015 in [place], hypothyroidism and severe anemia for which she has undergone multiple iron transfusions. Kathlyn Bloch was previously on rivaroxaban for CVA risk reduction of her atrial fibrillation however this was discontinued. Nuclear stress test in 5/2017 demonstrated preserved LV function without ischemia. She underwent holter monitor which demonstrated 20% PVC burden and failed to reveal atrial fibrillation. She was offered Flecainide 50mg BID however she was hesitant to start this. She was seen by hematologist who felt that her anemia was not driving her symptoms.  We started a trial of low-dose Toprol which has been ineffective in improving her symptoms and was discontinued. She underwent Watchman implantation and experienced AF with RVR post procedure. Echocardiogram showed trivial effusion only. She was discharged on amiodarone and subsequently converted to SR. She underwent monitoring which failed to reveal recurrence of AF while off amiodarone. She reports easy bruising while on plavix/aspirin. Also has had left lower extremity ultrasound. Her plavix was discontinued and she has continued asprin. Venous ultrasounds were negative for DVT. EKG shows sinus bradycardia.       ACTIVE PROBLEM LIST     Patient Active Problem List    Diagnosis Date Noted    Presence of Watchman left atrial appendage closure device 07/16/2019     Priority: 1 - One    Peripheral vascular disease ([address] 12/18/2019    Atrial fibrillation ([address] 07/16/2019    Hematuria 07/15/2019    Other hyperlipidemia 02/02/2019    Symptomatic anemia 08/23/2017    Hypothyroid 08/23/2017    Cigarette nicotine dependence without complication 93/27/2485    Dental infection 05/20/2017    Calculus of gallbladder with chronic cholecystitis without obstruction 08/15/2016    S/P ablation of atrial fibrillation 08/15/2016    Hashimoto's thyroiditis 08/15/2016    Irritable bowel syndrome with diarrhea 08/15/2016    Paroxysmal A-fib [facility] 09/24/2015           PAST MEDICAL HISTORY     Past Medical History:   Diagnosis Date    Anemia     Atrial fibrillation (HCC)     s/p pulm vein isolation 2015    Atrial fibrillation with rapid ventricular response ([address] 5/20/2017    Basal cell carcinoma     Depression     GERD (gastroesophageal reflux disease)     Hematuria 7/15/2019    Hypothyroidism     Ill-defined condition     bleeding in liver while on Xarelto    Tremor            PAST SURGICAL HISTORY     Past Surgical History:   Procedure Laterality Date    CARDIAC SURG PROCEDURE UNLIST  2015    cardioversionx4    CARDIAC SURG PROCEDURE UNLIST  2014    ABLATION     COLONOSCOPY N/A 9/6/2017    COLONOSCOPY performed by Trev Balderrama [facility] at [facility] COLONOSCOPY N/A 12/3/2019    COLONOSCOPY ( No Info to ) performed by Garcia Rasmussen MD at [facility]    HX AFIB ABLATION  2016    HX CATARACT REMOVAL Bilateral 2015    HX CHOLECYSTECTOMY  08/26/2016    HX DILATION AND CURETTAGE  1989    HX GI      colonoscopy    HX ORTHOPAEDIC Bilateral 2001    arthroscopic knee surgery    HX POLYPECTOMY  2015    HX TONSILLECTOMY  1948    HX TUBAL LIGATION  1989          ALLERGIES     No Known Allergies       FAMILY HISTORY     Family History   Problem Relation Age of Onset    Heart Disease Father     Cancer Paternal Aunt     Anesth Problems Neg Hx     negative for cardiac disease       SOCIAL HISTORY     Social History     Socioeconomic History    Marital status: SINGLE     Spouse name: Not on file    Number of children: Not on file    Years of education: Not on file    Highest education level: Not on file   Tobacco Use    Smoking status: Former Smoker     Packs/day: 0.50     Years: 50.00     Pack years: 25.00     Last attempt to quit: 7/31/2019     Years since quittin.4    Smokeless tobacco: Never Used   Substance and Sexual Activity    Alcohol use: Not Currently     Frequency: Never     Comment: OCCA    Drug use: No    Sexual activity: Not Currently     Partners: Male         MEDICATIONS     Current Outpatient Medications   Medication Sig    pantoprazole (PROTONIX) 40 mg tablet Take 1 Tab by mouth daily.  vortioxetine (TRINTELLIX) 10 mg tablet Take 10 mg by mouth daily as needed.  aspirin delayed-release 81 mg tablet Take 1 Tab by mouth daily.  dilTIAZem CD (CARDIZEM CD) 240 mg ER capsule Take 1 Cap by mouth daily.  levothyroxine (SYNTHROID) 100 mcg tablet Take 100 mcg by mouth Daily (before breakfast). No current facility-administered medications for this visit. I have reviewed the nurses notes, vitals, problem list, allergy list, medical history, family, social history and medications. REVIEW OF SYMPTOMS      General: Pt denies excessive weight gain or loss. Pt is able to conduct ADL's  HEENT: Denies blurred vision, headaches, hearing loss, epistaxis and difficulty swallowing. Respiratory: Denies cough, congestion, shortness of breath, MARY, wheezing or stridor. Cardiovascular: Denies precordial pain, palpitations, edema or PND  Gastrointestinal: Denies poor appetite, indigestion, abdominal pain or blood in stool  Genitourinary: Denies hematuria, dysuria, increased urinary frequency  Musculoskeletal: Denies joint pain or swelling from muscles or joints  Neurologic: Denies tremor, paresthesias, headache, or sensory motor disturbance  Psychiatric: Denies confusion, insomnia, depression  Integumentray: Denies rash, itching or ulcers. Hematologic: Denies easy bruising, bleeding       PHYSICAL EXAMINATION      There were no vitals filed for this visit. General: Well developed, in no acute distress.   HEENT: No jaundice, oral mucosa moist, no oral ulcers  Neck: Supple, no stiffness, no lymphadenopathy, supple  Heart:  Normal S1/S2 negative S3 or S4. Regular, no murmur, gallop or rub, no jugular venous distention  Respiratory: Clear bilaterally x 4, no wheezing or rales  Abdomen:   Soft, non-tender, bowel sounds are active.   Extremities:  No edema, normal cap refill, no cyanosis. Musculoskeletal: No clubbing, no deformities  Neuro: A&Ox3, speech clear, gait stable, cooperative, no focal neurologic deficits  Skin: Skin color is normal. No rashes or lesions. Non diaphoretic, moist.  Vascular: 2+ pulses symmetric in all extremities       DIAGNOSTIC DATA      EKG: sinus bradycardia        LABORATORY DATA      Lab Results   Component Value Date/Time    WBC 5.9 12/18/2019 12:12 PM    HGB 10.5 (L) 12/18/2019 12:12 PM    HCT 31.7 (L) 12/18/2019 12:12 PM    PLATELET 714 39/44/7701 12:12 PM    MCV 89 12/18/2019 12:12 PM      Lab Results   Component Value Date/Time    Sodium 140 07/18/2019 04:20 AM    Potassium 3.8 07/18/2019 04:20 AM    Chloride 109 (H) 07/18/2019 04:20 AM    CO2 25 07/18/2019 04:20 AM    Anion gap 6 07/18/2019 04:20 AM    Glucose 100 07/18/2019 04:20 AM    BUN 16 07/18/2019 04:20 AM    Creatinine 0.93 07/18/2019 04:20 AM    BUN/Creatinine ratio 17 07/18/2019 04:20 AM    GFR est AA >60 07/18/2019 04:20 AM    GFR est non-AA 59 (L) 07/18/2019 04:20 AM    Calcium 8.7 07/18/2019 04:20 AM    Bilirubin, total 0.3 07/11/2019 10:45 AM    AST (SGOT) 14 (L) 07/11/2019 10:45 AM    Alk. phosphatase 82 07/11/2019 10:45 AM    Protein, total 6.6 07/11/2019 10:45 AM    Albumin 3.5 07/11/2019 10:45 AM    Globulin 3.1 07/11/2019 10:45 AM    A-G Ratio 1.1 07/11/2019 10:45 AM    ALT (SGPT) 14 07/11/2019 10:45 AM           ASSESSMENT      1. Atrial fibrillation                                         Z. Paroxysmal                        S. PVI 2015                        C. RAISSA SANCHEZ 7/18/19  2. Severe anemia  3. Hypothyroidism  4. Premature ventricular contractions                        A.  Symptomatic  5. Diverticulosis     PLAN     She can stop protonix as she is no longer taking plavix. She would like to eventually discontinue her diltiazem; however, she just filled her 240mg prescription. She will notify us when refill is needed and will decrease dose to 120mg. FOLLOW-UP   1 year    Thank you, Lavon Deng NP for allowing me to participate in the care of this extraordinarily pleasant female. Please do not hesitate to contact me for further questions/concerns.        Grecia Ortiz NP

## 2020-01-22 NOTE — PROGRESS NOTES
Alivia Odell is a 66 y.o. female    Chief Complaint   Patient presents with    Irregular Heart Beat     afib    Other     PVC     RM# 1      Chest pain No    SOB patient states some SOB     Dizziness No    Swelling No    Refills No    Visit Vitals  /68 (BP 1 Location: Left arm, BP Patient Position: Sitting)   Pulse 62   Ht 5' 5\" (1.651 m)   Wt 156 lb (70.8 kg)   SpO2 97%   BMI 25.96 kg/m²       1. Have you been to the ER, urgent care clinic since your last visit? Hospitalized since your last visit? no    2. Have you seen or consulted any other health care providers outside of the [address] [place] [address] since your last visit? Include any pap smears or colon screening.   no

## 2020-02-11 ENCOUNTER — HOSPITAL ENCOUNTER (OUTPATIENT)
Dept: CT IMAGING | Age: 75
Discharge: HOME | End: 2020-02-11
Attending: NURSE PRACTITIONER
Payer: MEDICARE

## 2020-02-11 DIAGNOSIS — R93.89 ABNORMAL CHEST CT: ICD-10-CM

## 2020-02-11 PROCEDURE — 71250 CT THORAX DX C-: CPT

## 2020-02-19 RX ORDER — DILTIAZEM HYDROCHLORIDE 240 MG/1
240 CAPSULE, COATED, EXTENDED RELEASE ORAL DAILY
Qty: 90 CAP | Refills: 3 | Status: SHIPPED | OUTPATIENT
Start: 2020-02-19 | End: 2020-12-04

## 2020-06-04 ENCOUNTER — APPOINTMENT (OUTPATIENT)
Dept: GENERAL RADIOLOGY | Age: 75
End: 2020-06-04
Attending: EMERGENCY MEDICINE
Payer: MEDICARE

## 2020-06-04 ENCOUNTER — TELEPHONE (OUTPATIENT)
Dept: CARDIOLOGY CLINIC | Age: 75
End: 2020-06-04

## 2020-06-04 ENCOUNTER — HOSPITAL ENCOUNTER (EMERGENCY)
Age: 75
Discharge: HOME | End: 2020-06-04
Attending: EMERGENCY MEDICINE | Admitting: EMERGENCY MEDICINE
Payer: MEDICARE

## 2020-06-04 VITALS
RESPIRATION RATE: 21 BRPM | HEIGHT: 65 IN | OXYGEN SATURATION: 96 % | BODY MASS INDEX: 26.33 KG/M2 | HEART RATE: 80 BPM | DIASTOLIC BLOOD PRESSURE: 72 MMHG | TEMPERATURE: 99.2 F | WEIGHT: 158 LBS | SYSTOLIC BLOOD PRESSURE: 121 MMHG

## 2020-06-04 DIAGNOSIS — I48.91 ATRIAL FIBRILLATION WITH RAPID VENTRICULAR RESPONSE (HCC): Primary | ICD-10-CM

## 2020-06-04 LAB
ANION GAP SERPL CALC-SCNC: 4 MMOL/L (ref 5–15)
BASOPHILS # BLD: 0 K/UL (ref 0–0.1)
BASOPHILS NFR BLD: 1 % (ref 0–1)
BUN SERPL-MCNC: 27 MG/DL (ref 6–20)
BUN/CREAT SERPL: 22 (ref 12–20)
CALCIUM SERPL-MCNC: 10.1 MG/DL (ref 8.5–10.1)
CHLORIDE SERPL-SCNC: 111 MMOL/L (ref 97–108)
CO2 SERPL-SCNC: 25 MMOL/L (ref 21–32)
COMMENT, HOLDF: NORMAL
CREAT SERPL-MCNC: 1.21 MG/DL (ref 0.55–1.02)
DIFFERENTIAL METHOD BLD: ABNORMAL
EOSINOPHIL # BLD: 0.2 K/UL (ref 0–0.4)
EOSINOPHIL NFR BLD: 2 % (ref 0–7)
ERYTHROCYTE [DISTWIDTH] IN BLOOD BY AUTOMATED COUNT: 17 % (ref 11.5–14.5)
GLUCOSE SERPL-MCNC: 99 MG/DL (ref 65–100)
HCT VFR BLD AUTO: 39.8 % (ref 35–47)
HGB BLD-MCNC: 12.7 G/DL (ref 11.5–16)
IMM GRANULOCYTES # BLD AUTO: 0 K/UL (ref 0–0.04)
IMM GRANULOCYTES NFR BLD AUTO: 0 % (ref 0–0.5)
LYMPHOCYTES # BLD: 1.3 K/UL (ref 0.8–3.5)
LYMPHOCYTES NFR BLD: 17 % (ref 12–49)
MAGNESIUM SERPL-MCNC: 2.2 MG/DL (ref 1.6–2.4)
MCH RBC QN AUTO: 28 PG (ref 26–34)
MCHC RBC AUTO-ENTMCNC: 31.9 G/DL (ref 30–36.5)
MCV RBC AUTO: 87.7 FL (ref 80–99)
MONOCYTES # BLD: 0.6 K/UL (ref 0–1)
MONOCYTES NFR BLD: 9 % (ref 5–13)
NEUTS SEG # BLD: 5.4 K/UL (ref 1.8–8)
NEUTS SEG NFR BLD: 71 % (ref 32–75)
NRBC # BLD: 0 K/UL (ref 0–0.01)
NRBC BLD-RTO: 0 PER 100 WBC
PLATELET # BLD AUTO: 269 K/UL (ref 150–400)
PMV BLD AUTO: 10.4 FL (ref 8.9–12.9)
POTASSIUM SERPL-SCNC: 4.2 MMOL/L (ref 3.5–5.1)
RBC # BLD AUTO: 4.54 M/UL (ref 3.8–5.2)
SAMPLES BEING HELD,HOLD: NORMAL
SODIUM SERPL-SCNC: 140 MMOL/L (ref 136–145)
TROPONIN I SERPL-MCNC: <0.05 NG/ML
TSH SERPL DL<=0.05 MIU/L-ACNC: 1.15 UIU/ML (ref 0.36–3.74)
WBC # BLD AUTO: 7.5 K/UL (ref 3.6–11)

## 2020-06-04 PROCEDURE — 71045 X-RAY EXAM CHEST 1 VIEW: CPT

## 2020-06-04 PROCEDURE — 74011250636 HC RX REV CODE- 250/636: Performed by: EMERGENCY MEDICINE

## 2020-06-04 PROCEDURE — 93005 ELECTROCARDIOGRAM TRACING: CPT

## 2020-06-04 PROCEDURE — 99285 EMERGENCY DEPT VISIT HI MDM: CPT

## 2020-06-04 PROCEDURE — 96361 HYDRATE IV INFUSION ADD-ON: CPT

## 2020-06-04 PROCEDURE — 85025 COMPLETE CBC W/AUTO DIFF WBC: CPT

## 2020-06-04 PROCEDURE — 74011000250 HC RX REV CODE- 250: Performed by: EMERGENCY MEDICINE

## 2020-06-04 PROCEDURE — 84484 ASSAY OF TROPONIN QUANT: CPT

## 2020-06-04 PROCEDURE — 96374 THER/PROPH/DIAG INJ IV PUSH: CPT

## 2020-06-04 PROCEDURE — 83735 ASSAY OF MAGNESIUM: CPT

## 2020-06-04 PROCEDURE — 84443 ASSAY THYROID STIM HORMONE: CPT

## 2020-06-04 PROCEDURE — 80048 BASIC METABOLIC PNL TOTAL CA: CPT

## 2020-06-04 PROCEDURE — 74011250637 HC RX REV CODE- 250/637: Performed by: PHYSICIAN ASSISTANT

## 2020-06-04 RX ORDER — METOPROLOL TARTRATE 25 MG/1
25 TABLET, FILM COATED ORAL 2 TIMES DAILY
Qty: 60 TAB | Refills: 0 | Status: SHIPPED | OUTPATIENT
Start: 2020-06-04 | End: 2020-07-02 | Stop reason: SDUPTHER

## 2020-06-04 RX ORDER — METOPROLOL TARTRATE 25 MG/1
25 TABLET, FILM COATED ORAL
Status: COMPLETED | OUTPATIENT
Start: 2020-06-04 | End: 2020-06-04

## 2020-06-04 RX ORDER — DILTIAZEM HYDROCHLORIDE 5 MG/ML
20 INJECTION INTRAVENOUS
Status: COMPLETED | OUTPATIENT
Start: 2020-06-04 | End: 2020-06-04

## 2020-06-04 RX ORDER — CHOLECALCIFEROL TAB 125 MCG (5000 UNIT) 125 MCG
5000 TAB ORAL DAILY
COMMUNITY

## 2020-06-04 RX ADMIN — SODIUM CHLORIDE 1000 ML: 900 INJECTION, SOLUTION INTRAVENOUS at 17:38

## 2020-06-04 RX ADMIN — DILTIAZEM HYDROCHLORIDE 20 MG: 5 INJECTION INTRAVENOUS at 17:41

## 2020-06-04 RX ADMIN — METOPROLOL TARTRATE 25 MG: 25 TABLET, FILM COATED ORAL at 19:08

## 2020-06-04 NOTE — TELEPHONE ENCOUNTER
Joycelyn Strong NP with [facility] is requesting the patient be seen in office with Dr Jeremy Gutierrez. She states that the patient had an EKG done that showed afib with RVR with a HR of 127 and she was experiencing SOB. Please call patient to schedule. Thanks.      Phone: [phone]

## 2020-06-04 NOTE — TELEPHONE ENCOUNTER
Spoke w/pt stating she was seen by PCP this afternoon and told to schedule an appt. W/Dr. Luther Villela. EKG confirmed Afib w/RVR . Pt states she is feeling symptomatic w/SOB, fatigue. Pt states MDO told her she did not have to go to ED unless symptoms worsen. I advised pt since she is symptomatic and HR is elevated she needs to be seen in ED. Pt was apprehensive to come to ED w/COVID19. Pt will have a friend drive her to [facility] now. Pt  stated symptoms began ~1week ago and have worsened daily. Dr. Luther Villela advised.

## 2020-06-04 NOTE — PROGRESS NOTES
[facility]: MED RECONCILIATION    Comments/Recommendations:   Reviewed PTA medications with patient. Confirmed pt is no longer on anticoagulation, she no longer takes clopidogrel, takes ASA 81 mg once daily. She reports good compliance with medications, and took all meds, including diltiazem this AM.     Information obtained from: Patient, RxQuery, Chart review    Allergies: Patient has no known allergies. Prior to Admission Medications:     Medication Documentation Review Audit       Reviewed by JANIS TejadaD (Pharmacist) on 06/04/20 at 1845      Medication Sig Documenting Provider Last Dose Status Taking?   aspirin delayed-release 81 mg tablet Take 1 Tab by mouth daily. Gomez Maldonado MD 6/4/2020 Active Yes   cholecalciferol (VITAMIN D3) (5000 Units/125 mcg) tab tablet Take 5,000 Units by mouth daily. Provider, Historical 6/4/2020 Active Yes   dilTIAZem CD (CARDIZEM CD) 240 mg ER capsule Take 1 Cap by mouth daily. Neha Reyes NP 6/4/2020 Active Yes   levothyroxine (SYNTHROID) 100 mcg tablet Take 100 mcg by mouth Daily (before breakfast).  Provider, Historical 6/4/2020 Active Yes                  Shahid Burgess PHARMD   Contact: [phone]

## 2020-06-04 NOTE — ED PROVIDER NOTES
65 y/o female with pmhx of anemia, afib, basal cell carcinoma, depression, GERD, hypothyroid, s/p watchman's device presents with \"not feeling very well\" with some shortness of breath for about 10 days. Presents from PCP who referred her due to irregular heart rhythm. Pt states she went to PCP due to not feeling well. She has had no chest pain. Pt has taken her diltiazem as prescibed. Denies fever, chills, nausea, vomiting, abdominal pain, leg swelling. No other complaints at this time.            Past Medical History:   Diagnosis Date    Anemia     Atrial fibrillation ([address]     s/p pulm vein isolation 2015    Atrial fibrillation with rapid ventricular response ([address] 5/20/2017    Basal cell carcinoma     Depression     GERD (gastroesophageal reflux disease)     Hematuria 7/15/2019    Hypothyroidism     Ill-defined condition     bleeding in liver while on Xarelto    Tremor        Past Surgical History:   Procedure Laterality Date    CARDIAC SURG PROCEDURE UNLIST  2015    cardioversionx4    CARDIAC SURG PROCEDURE UNLIST  2014    ABLATION     COLONOSCOPY N/A 9/6/2017    COLONOSCOPY performed by Becky Cortez MD at [address] COLONOSCOPY N/A 12/3/2019    COLONOSCOPY ( No Info to ) performed by Charmaine Wilkinson MD at [address]    HX AFIB ABLATION  2016    HX CATARACT REMOVAL Bilateral 2015    HX CHOLECYSTECTOMY  08/26/2016    HX DILATION AND CURETTAGE  1989    HX GI      colonoscopy    HX ORTHOPAEDIC Bilateral 2001    arthroscopic knee surgery    HX POLYPECTOMY  2015    HX TONSILLECTOMY  1948    HX TUBAL LIGATION  1989         Family History:   Problem Relation Age of Onset    Heart Disease Father     Cancer Paternal Aunt     Anesth Problems Neg Hx        Social History     Socioeconomic History    Marital status: SINGLE     Spouse name: Not on file    Number of children: Not on file    Years of education: Not on file    Highest education level: Not on file   Occupational History    Not on file   Social Needs    Financial resource strain: Not on file    Food insecurity     Worry: Not on file     Inability: Not on file    Transportation needs     Medical: Not on file     Non-medical: Not on file   Tobacco Use    Smoking status: Former Smoker     Packs/day: 0.50     Years: 50.00     Pack years: 25.00     Last attempt to quit: 2019     Years since quittin.8    Smokeless tobacco: Never Used   Substance and Sexual Activity    Alcohol use: Not Currently     Frequency: Never     Comment: OCCA    Drug use: No    Sexual activity: Not Currently     Partners: Male   Lifestyle    Physical activity     Days per week: Not on file     Minutes per session: Not on file    Stress: Not on file   Relationships    Social connections     Talks on phone: Not on file     Gets together: Not on file     Attends Tenriism service: Not on file     Active member of club or organization: Not on file     Attends meetings of clubs or organizations: Not on file     Relationship status: Not on file    Intimate partner violence     Fear of current or ex partner: Not on file     Emotionally abused: Not on file     Physically abused: Not on file     Forced sexual activity: Not on file   Other Topics Concern    Not on file   Social History Narrative    Not on file         ALLERGIES: Patient has no known allergies. Review of Systems   Constitutional: Positive for fatigue. Negative for fever. HENT: Negative for congestion and sore throat. Respiratory: Positive for shortness of breath. Negative for cough and chest tightness. Cardiovascular: Negative for chest pain and leg swelling. Gastrointestinal: Negative for nausea and vomiting. Genitourinary: Negative for dysuria. Musculoskeletal: Negative for myalgias. Skin: Negative for rash. Neurological: Negative for dizziness and weakness.        Vitals:    20 1721 20 1741 20 1745 06/04/20 1800   BP: 133/82 133/82 114/71 122/78   Pulse: (!) 123 (!) 130 (!) 113 92   Resp: 20  17 15   Temp: 99.2 °F (37.3 °C)      SpO2:   96% 97%   Weight: 71.7 kg (158 lb)      Height: 5' 5\" (1.651 m)               Physical Exam  Vitals signs and nursing note reviewed. Constitutional:       General: She is not in acute distress. Appearance: She is not ill-appearing. HENT:      Head: Normocephalic. Nose: No rhinorrhea. Mouth/Throat:      Mouth: Mucous membranes are moist.      Pharynx: Oropharynx is clear. No posterior oropharyngeal erythema. Eyes:      Pupils: Pupils are equal, round, and reactive to light. Neck:      Musculoskeletal: Normal range of motion. Cardiovascular:      Rate and Rhythm: Tachycardia present. Rhythm irregular. Heart sounds: Normal heart sounds. Pulmonary:      Effort: Pulmonary effort is normal. No tachypnea or accessory muscle usage. Breath sounds: Normal breath sounds. No wheezing. Abdominal:      General: Abdomen is flat. There is no distension. Palpations: Abdomen is soft. Tenderness: There is no abdominal tenderness. Musculoskeletal:      Right lower leg: No edema. Left lower leg: No edema. Skin:     General: Skin is warm. Capillary Refill: Capillary refill takes less than 2 seconds. Findings: No erythema or rash. Neurological:      Mental Status: She is alert.    Psychiatric:         Mood and Affect: Mood normal.         Behavior: Behavior normal.        Medications   sodium chloride 0.9 % bolus infusion 1,000 mL (0 mL IntraVENous IV Completed 6/4/20 2007)   dilTIAZem (CARDIZEM) injection 20 mg (20 mg IntraVENous Given 6/4/20 1741)   metoprolol tartrate (LOPRESSOR) tablet 25 mg (25 mg Oral Given 6/4/20 1908)     Labs Reviewed   CBC WITH AUTOMATED DIFF - Abnormal; Notable for the following components:       Result Value    RDW 17.0 (*)     All other components within normal limits   METABOLIC PANEL, BASIC - Abnormal; Notable for the following components:    Chloride 111 (*)     Anion gap 4 (*)     BUN 27 (*)     Creatinine 1.21 (*)     BUN/Creatinine ratio 22 (*)     GFR est AA 52 (*)     GFR est non-AA 43 (*)     All other components within normal limits   SAMPLES BEING HELD   TROPONIN I   MAGNESIUM   TSH 3RD GENERATION     XR CHEST PORT   Final Result   IMPRESSION: No infiltrate        //  EKG  Atrial fibrillation with RVR, rate of 133bpm, normal axis, no ST elevation or depression indicative for acute ischemia  //    MDM  Number of Diagnoses or Management Options  Atrial fibrillation with rapid ventricular response [facility]):      Amount and/or Complexity of Data Reviewed  Clinical lab tests: reviewed  Tests in the radiology section of CPT®: reviewed      67 y/o female presents with SOB and fatigue with afib with RVR. Cardizem bolus decreased rate to about 90bpm and greatly improved symptoms. Consulted pt's cardiologist who recommended trial of 25mg metoprolol in ED to see if tolerated and send home with metoprolol 25mg BID. Metoprolol tolerated with with rate in 80s and stable blood pressure. Pt feeling much better. Discussed calling cardiologist in the morning for prompt follow-up. Discussed indications for return to ED such as worsened shortness of breath, chest pain, weakness, syncope. Procedures    Case reviewed with attending physician, Dr. Cory Woodard.     Howard Whitt PA-C  6/5/2020

## 2020-06-04 NOTE — ED TRIAGE NOTES
Patient arrives from home after seeing physician today. Patient was told that she is in A-fib. Patient has hx of A-fib, and states that she is not on coumadin, but has a \"watchman. \"    Patient reports SOB for the past week, dizziness, and palpitations.

## 2020-06-05 ENCOUNTER — PATIENT OUTREACH (OUTPATIENT)
Dept: FAMILY MEDICINE CLINIC | Age: 75
End: 2020-06-05

## 2020-06-05 ENCOUNTER — VIRTUAL VISIT (OUTPATIENT)
Dept: CARDIOLOGY CLINIC | Age: 75
End: 2020-06-05

## 2020-06-05 DIAGNOSIS — E03.9 HYPOTHYROIDISM, UNSPECIFIED TYPE: ICD-10-CM

## 2020-06-05 DIAGNOSIS — R06.02 SOB (SHORTNESS OF BREATH): ICD-10-CM

## 2020-06-05 DIAGNOSIS — Z95.818 PRESENCE OF WATCHMAN LEFT ATRIAL APPENDAGE CLOSURE DEVICE: ICD-10-CM

## 2020-06-05 DIAGNOSIS — I48.91 ATRIAL FIBRILLATION, UNSPECIFIED TYPE (HCC): Primary | ICD-10-CM

## 2020-06-05 DIAGNOSIS — I73.9 PERIPHERAL VASCULAR DISEASE (HCC): ICD-10-CM

## 2020-06-05 DIAGNOSIS — Z86.79 S/P ABLATION OF ATRIAL FIBRILLATION: ICD-10-CM

## 2020-06-05 DIAGNOSIS — Z98.890 S/P ABLATION OF ATRIAL FIBRILLATION: ICD-10-CM

## 2020-06-05 NOTE — DISCHARGE INSTRUCTIONS

## 2020-06-05 NOTE — PROGRESS NOTES
VIRTUAL VISIT DOCUMENTATION     Pursuant to the emergency declaration under the [facility], [facility] waiver authority and the [employer] Act, this Virtual  Visit was conducted, with patient's consent, to reduce the patient's risk of exposure to COVID-19 and provide continuity of care for an established patient. Services were provided through a video synchronous discussion virtually to substitute for in-person clinic visit. HISTORY OF PRESENTING ILLNESS      Skinny Kennedy is a 66 y.o. female who was seen by synchronous (real-time) audio-video technology on 6/5/2020 with paroxysmal atrial fibrillation status post pulmonary vein isolation in 2015 in [place], hypothyroidism and severe anemia for which she has undergone multiple iron transfusions. She was previously on rivaroxaban for CVA risk reduction of her atrial fibrillation however this was discontinued. Nuclear stress test in 5/2017 demonstrated preserved LV function without ischemia. She underwent holter monitor which demonstrated 20% PVC burden and failed to reveal atrial fibrillation. She was offered Flecainide 50mg BID however she was hesitant to start this. She was seen by hematologist who felt that her anemia was not driving her symptoms. We started a trial of low-dose Toprol which has been ineffective in improving her symptoms and was discontinued. She underwent Watchman implantation and experienced AF with RVR post procedure. Echocardiogram showed trivial effusion only. She was discharged on amiodarone and subsequently converted to SR. She underwent monitoring which failed to reveal recurrence of AF while off amiodarone. She reports easy bruising while on plavix/aspirin. Also has had left lower extremity ultrasound. Her plavix was discontinued and she has continued asprin. Venous ultrasounds were negative for DVT. EKG shows sinus bradycardia. She presented to the ER with AF with RVR and was given cardizem 20 mg IV and was discharged with metoprolol being added to her regimen. She remains in AF but rates are in the 90s.         ACTIVE PROBLEM LIST     Patient Active Problem List    Diagnosis Date Noted    Presence of Watchman left atrial appendage closure device 07/16/2019     Priority: 1 - One    Peripheral vascular disease ([address] 12/18/2019    Atrial fibrillation ([address] 07/16/2019    Hematuria 07/15/2019    Other hyperlipidemia 02/02/2019    Symptomatic anemia 08/23/2017    Hypothyroid 08/23/2017    Cigarette nicotine dependence without complication 32/08/5244    Dental infection 05/20/2017    Calculus of gallbladder with chronic cholecystitis without obstruction 08/15/2016    S/P ablation of atrial fibrillation 08/15/2016    Hashimoto's thyroiditis 08/15/2016    Irritable bowel syndrome with diarrhea 08/15/2016    Paroxysmal A-fib ([address] 09/24/2015           PAST MEDICAL HISTORY     Past Medical History:   Diagnosis Date    Anemia     Atrial fibrillation ([address]     s/p pulm vein isolation 2015    Atrial fibrillation with rapid ventricular response ([address] 5/20/2017    Basal cell carcinoma     Depression     GERD (gastroesophageal reflux disease)     Hematuria 7/15/2019    Hypothyroidism     Ill-defined condition     bleeding in liver while on Xarelto    Tremor            PAST SURGICAL HISTORY     Past Surgical History:   Procedure Laterality Date    CARDIAC SURG PROCEDURE UNLIST  2015    cardioversionx4    CARDIAC SURG PROCEDURE UNLIST  2014    ABLATION     COLONOSCOPY N/A 9/6/2017    COLONOSCOPY performed by Malaika Keller MD at [facility] COLONOSCOPY N/A 12/3/2019    COLONOSCOPY ( No Info to ) performed by Severo Villa MD at [facility] HX [address] HX AFIB ABLATION  2016    HX CATARACT REMOVAL Bilateral 2015    HX CHOLECYSTECTOMY  08/26/2016    [facility]  HX GI      colonoscopy    HX ORTHOPAEDIC Bilateral 2001    arthroscopic knee surgery    HX POLYPECTOMY      HX TONSILLECTOMY  1948    HX TUBAL LIGATION            ALLERGIES     No Known Allergies       FAMILY HISTORY     Family History   Problem Relation Age of Onset    Heart Disease Father     Cancer Paternal Aunt     Anesth Problems Neg Hx     negative for cardiac disease       SOCIAL HISTORY     Social History     Socioeconomic History    Marital status: SINGLE     Spouse name: Not on file    Number of children: Not on file    Years of education: Not on file    Highest education level: Not on file   Tobacco Use    Smoking status: Former Smoker     Packs/day: 0.50     Years: 50.00     Pack years: 25.00     Last attempt to quit: 2019     Years since quittin.8    Smokeless tobacco: Never Used   Substance and Sexual Activity    Alcohol use: Not Currently     Frequency: Never     Comment: OCCA    Drug use: No    Sexual activity: Not Currently     Partners: Male         MEDICATIONS     Current Outpatient Medications   Medication Sig    cholecalciferol (VITAMIN D3) (5000 Units/125 mcg) tab tablet Take 5,000 Units by mouth daily.  metoprolol tartrate (LOPRESSOR) 25 mg tablet Take 1 Tab by mouth two (2) times a day for 30 days.  dilTIAZem CD (CARDIZEM CD) 240 mg ER capsule Take 1 Cap by mouth daily.  aspirin delayed-release 81 mg tablet Take 1 Tab by mouth daily.  levothyroxine (SYNTHROID) 100 mcg tablet Take 100 mcg by mouth Daily (before breakfast). No current facility-administered medications for this visit. I have reviewed the nurses notes, vitals, problem list, allergy list, medical history, family, social history and medications. REVIEW OF SYMPTOMS      General: Pt denies excessive weight gain or loss. Pt is able to conduct ADL's  HEENT: Denies blurred vision, headaches, hearing loss, epistaxis and difficulty swallowing.   Respiratory: Denies cough, congestion, shortness of breath, MARY, wheezing or stridor. Cardiovascular: Denies precordial pain, palpitations, edema or PND  Gastrointestinal: Denies poor appetite, indigestion, abdominal pain or blood in stool  Genitourinary: Denies hematuria, dysuria, increased urinary frequency  Musculoskeletal: Denies joint pain or swelling from muscles or joints  Neurologic: Denies tremor, paresthesias, headache, or sensory motor disturbance  Psychiatric: Denies confusion, insomnia, depression  Integumentray: Denies rash, itching or ulcers. Hematologic: Denies easy bruising, bleeding       PHYSICAL EXAMINATION      Due to this being a TeleHealth evaluation, many elements of the physical examination are unable to be assessed. General: Well developed, in no acute distress, cooperative and alert  HEENT: Pupils equal/round. No marked JVD visible on video. Respiratory: No audible wheezing, no signs of respiratory distress, lips non cyanotic  Extremities:  No edema  Neuro: A&Ox3, speech clear, no facial droop, answering questions appropriately  Skin: Skin color is normal. No rashes or lesions.  Non diaphoretic on visible skin during exam       DIAGNOSTIC DATA      EKG:        LABORATORY DATA      Lab Results   Component Value Date/Time    WBC 7.5 06/04/2020 05:28 PM    HGB 12.7 06/04/2020 05:28 PM    HCT 39.8 06/04/2020 05:28 PM    PLATELET 389 27/58/8213 05:28 PM    MCV 87.7 06/04/2020 05:28 PM      Lab Results   Component Value Date/Time    Sodium 140 06/04/2020 05:28 PM    Potassium 4.2 06/04/2020 05:28 PM    Chloride 111 (H) 06/04/2020 05:28 PM    CO2 25 06/04/2020 05:28 PM    Anion gap 4 (L) 06/04/2020 05:28 PM    Glucose 99 06/04/2020 05:28 PM    BUN 27 (H) 06/04/2020 05:28 PM    Creatinine 1.21 (H) 06/04/2020 05:28 PM    BUN/Creatinine ratio 22 (H) 06/04/2020 05:28 PM    GFR est AA 52 (L) 06/04/2020 05:28 PM    GFR est non-AA 43 (L) 06/04/2020 05:28 PM    Calcium 10.1 06/04/2020 05:28 PM    Bilirubin, total 0.3 07/11/2019 10:45 AM    Alk. phosphatase 82 07/11/2019 10:45 AM    Protein, total 6.6 07/11/2019 10:45 AM    Albumin 3.5 07/11/2019 10:45 AM    Globulin 3.1 07/11/2019 10:45 AM    A-G Ratio 1.1 07/11/2019 10:45 AM    ALT (SGPT) 14 07/11/2019 10:45 AM           ASSESSMENT      1. Atrial fibrillation                                         A. Paroxysmal                        B. PVI 2015                        C. HASBLED 2                        D. WATCHMAN 7/18/19  2. Severe anemia  3. Hypothyroidism  4. Premature ventricular contractions                        A.  Symptomatic  5. Diverticulosis        ICD-10-CM ICD-9-CM    1. Atrial fibrillation, unspecified type ([address] I48.91 427.31    2. S/P ablation of atrial fibrillation Z98.890 V45.89     Z86.79     3. Presence of Watchman left atrial appendage closure device Z95.818 V45.09    4. Hypothyroidism, unspecified type E03.9 244.9    5. Peripheral vascular disease (HCC) I73.9 443.9    6. SOB (shortness of breath) R06.02 786.05      No orders of the defined types were placed in this encounter. PLAN     Discussed option of cardioversion, AF ablation vs PPM/AV node ablation. She wishes to monitor her response to metoprolol for now. We discussed the expected course, resolution and complications of the diagnosis(es) in detail. Medication risks, benefits, costs, interactions, and alternatives were discussed as indicated. I advised her to contact the office if her condition worsens, changes or fails to improve as anticipated. She expressed understanding with the diagnosis(es) and plan     FOLLOW-UP       Patient was made aware and verbalized understanding that an appointment will be scheduled for them for a virtual visit and/or office visit within the above time frame. Patient understanding his/her responsibility to call and change time/date if he/she so chooses.     Thank you, Abdelrahman Pabon NP for allowing me to participate in the care of this extraordinarily pleasant female. Please do not hesitate to contact me for further questions/concerns. So Tejeda MD  Cardiac Electrophysiology / Cardiology    [address] [facility] [address].  [address] [place] [address], [address] [place] [address], [address], [address] [place] [address], [facility]  [phone] / [phone] Fax  [phone] / [phone] Fax        Greater than 20 minutes was spent in direct audio patient care, planning and chart review. This visit was conducted using [employer] telemedicine services.

## 2020-06-05 NOTE — PROGRESS NOTES
Patient contacted regarding recent discharge and COVID-19 risk. Discussed COVID-19 related testing which was not done at this time. Test results were not done. Patient informed of results, if available? no    Care Transition Nurse/ Ambulatory Care Manager contacted the patient by telephone to perform post discharge assessment. Verified name and  with patient as identifiers. Patient has following risk factors of: AFIB. CTN/ACM reviewed discharge instructions, medical action plan and red flags related to discharge diagnosis. Reviewed and educated them on any new and changed medications related to discharge diagnosis. Advised obtaining a 90-day supply of all daily and as-needed medications. Education provided regarding infection prevention, and signs and symptoms of COVID-19 and when to seek medical attention with patient who verbalized understanding. Discussed exposure protocols and quarantine from [address] you at higher risk for severe illness  and given an opportunity for questions and concerns. The patient agrees to contact the COVID-19 hotline [phone] or PCP office for questions related to their healthcare. CTN/ACM provided contact information for future reference. From CDC: Are you at higher risk for severe illness?  Wash your hands often.  Avoid close contact (6 feet, which is about two arm lengths) with people who are sick.  Put distance between yourself and other people if COVID-19 is spreading in your community.  Clean and disinfect frequently touched surfaces.  Avoid all cruise travel and non-essential air travel.  Call your healthcare professional if you have concerns about COVID-19 and your underlying condition or if you are sick.     For more information on steps you can take to protect yourself, see CDC's How to Protect Yourself      Patient/family/caregiver given information for Cristian Vilchis and agrees to enroll no     Patient declines need for further COVID follow up, states she is feeling better today and in touch with her Cardiologist. Resolving JOSEFINA episode.

## 2020-06-08 ENCOUNTER — TELEPHONE (OUTPATIENT)
Dept: CARDIOLOGY CLINIC | Age: 75
End: 2020-06-08

## 2020-06-08 LAB
ATRIAL RATE: 153 BPM
CALCULATED R AXIS, ECG10: 8 DEGREES
CALCULATED T AXIS, ECG11: 161 DEGREES
DIAGNOSIS, 93000: NORMAL
Q-T INTERVAL, ECG07: 330 MS
QRS DURATION, ECG06: 64 MS
QTC CALCULATION (BEZET), ECG08: 491 MS
VENTRICULAR RATE, ECG03: 133 BPM

## 2020-06-08 NOTE — TELEPHONE ENCOUNTER
Patient states she spoke with Dr. Devorah Bustamante on Friday and was told she needs to schedule an ablation. She states these are dates that she will not be available - 6/10, 6/22, 6/26. Please advise.      Phone: [phone]

## 2020-06-10 NOTE — TELEPHONE ENCOUNTER
Information noted. Confirmed order for Atrial Fib ablation with Dr. Mj Mckay. Per Dr. Mj Mckay \"Proceed with AF ablation; does not need ctrmi prior; continue all meds prior; gen anesthesia. \" Will contact patient to schedule her procedure.

## 2020-06-12 ENCOUNTER — TELEPHONE (OUTPATIENT)
Dept: CARDIOLOGY CLINIC | Age: 75
End: 2020-06-12

## 2020-06-12 DIAGNOSIS — I48.91 ATRIAL FIBRILLATION, UNSPECIFIED TYPE (HCC): Primary | ICD-10-CM

## 2020-06-12 DIAGNOSIS — Z98.890 S/P ABLATION OF ATRIAL FIBRILLATION: ICD-10-CM

## 2020-06-12 DIAGNOSIS — Z86.79 S/P ABLATION OF ATRIAL FIBRILLATION: ICD-10-CM

## 2020-06-12 RX ORDER — SODIUM CHLORIDE 0.9 % (FLUSH) 0.9 %
5-40 SYRINGE (ML) INJECTION AS NEEDED
Status: CANCELLED | OUTPATIENT
Start: 2020-06-12

## 2020-06-12 RX ORDER — SODIUM CHLORIDE 0.9 % (FLUSH) 0.9 %
5-40 SYRINGE (ML) INJECTION EVERY 8 HOURS
Status: CANCELLED | OUTPATIENT
Start: 2020-06-12

## 2020-06-12 NOTE — TELEPHONE ENCOUNTER
Discussed pre-procedure instructions and COVID19 testing. Pt will have COVID19 test at [facility] on 06/27/20 in the am. Pt denies prior COVID19 positive test or recent exposure. Pt denies fever, chills, body aches, HA, sore throat, diarrhea, SOB, recent loss of taste or smell.

## 2020-06-12 NOTE — LETTER
6/12/2020 1:01 PM 
 
Ms. Sofia Puri 
[address] [place] [address] [address] [id number] You are scheduled for an Atrial Fibrillation Ablation at [facility] on Tuesday, June 30, 2020. Please arrive by 10:00 am and park in the Cell Phone Lot located on the [employer] enclosed map) and call Hospital Registration at [phone]. A registration associate will tell you when you can proceed to the main entrance of the hospital. 
  
 
Do not eat or drink anything after midnight the night before your procedure. You will need a . You may stay overnight, please be prepared to stay if necessary. You may take your normal medications with a sip of water the morning of your procedure. [facility] [place] is requesting that you have COVID-19 testing 3-4 days prior to your procedure. Enclosed are a list of clinics which offer drive-up AHJOS-54 testing. As a patient scheduled for a procedure with Dr. Devorah Bustamante, this test will be free of charge if performed at one of these locations. No appointment is necessary. Please have your COVID-19 test performed on Saturday morning, June 27, 2020. Lab instructions: Please have labs drawn 3-7 days prior to scheduled procedure at any [facility] location. Fasting is not required. If not done at a [facility] location, have labs drawn 5-7 days prior. Have results faxed to [phone]. Please call our office if you have any questions at [phone]. Please call the office if you develop any type of illness prior to your procedure. Sincerely, French Nelson MD/Delmi Van, [facility]

## 2020-06-12 NOTE — TELEPHONE ENCOUNTER
Patient is calling to back to speak with you regarding scheduling an ablation and states that the 30th would be fine and to please just call with a date. Please advise.     Phone: [phone]

## 2020-06-26 ENCOUNTER — TELEPHONE (OUTPATIENT)
Dept: CARDIOLOGY CLINIC | Age: 75
End: 2020-06-26

## 2020-06-26 ENCOUNTER — OFFICE VISIT (OUTPATIENT)
Dept: PRIMARY CARE CLINIC | Age: 75
End: 2020-06-26

## 2020-06-26 VITALS — HEART RATE: 96 BPM | RESPIRATION RATE: 16 BRPM | OXYGEN SATURATION: 98 % | TEMPERATURE: 97.9 F

## 2020-06-26 DIAGNOSIS — Z01.818 PREOP TESTING: Primary | ICD-10-CM

## 2020-06-26 NOTE — PROGRESS NOTES
Patient is being seen at the [address] [place] [address]. Patient verbally consented to receiving care (with any associated testing) and billing insurance. Please see scanned documentation as well for further information. S:  Eve Merino  presents for pre-op or pre-procedure testing for Covid 19. Patient has ablation by Dr. Jefry Dominguez scheduled for 6/30/20. Patient denies current Covid type symptoms. O:     Visit Vitals  Pulse 96   Temp 97.9 °F (36.6 °C)   Resp 16   SpO2 98%       Alert and oriented  No acute distress, no increased work of breathing  Normocephalic, atraumatic  Skin color normal  Calm and cooperative  Voice clear, conversant without shortness of breath     A/P:    ICD-10-CM ICD-9-CM    1. Preop testing Z01.818 V72.84 NOVEL CORONAVIRUS (COVID-19)     Covid 19 testing performed  Patient understands she will be contacted if the results are positive.   Follow up prn     Junior Yo MD

## 2020-06-29 ENCOUNTER — TELEPHONE (OUTPATIENT)
Dept: CARDIOLOGY CLINIC | Age: 75
End: 2020-06-29

## 2020-06-29 LAB — SARS-COV-2, NAA: NOT DETECTED

## 2020-06-29 NOTE — TELEPHONE ENCOUNTER
Called patient, ID verified using two patient identifiers.  Asked patient to arrive at 7 am tomorrow @ [facility] for her AFib ablation instead of @ 10 am. Patient unable to arrive earlier than 10 am.

## 2020-06-30 ENCOUNTER — APPOINTMENT (OUTPATIENT)
Dept: CARDIAC CATH/INVASIVE PROCEDURES | Age: 75
End: 2020-06-30
Attending: INTERNAL MEDICINE
Payer: MEDICARE

## 2020-06-30 ENCOUNTER — ANESTHESIA (OUTPATIENT)
Dept: CARDIAC CATH/INVASIVE PROCEDURES | Age: 75
End: 2020-06-30
Payer: MEDICARE

## 2020-06-30 ENCOUNTER — ANESTHESIA EVENT (OUTPATIENT)
Dept: CARDIAC CATH/INVASIVE PROCEDURES | Age: 75
End: 2020-06-30
Payer: MEDICARE

## 2020-06-30 ENCOUNTER — HOSPITAL ENCOUNTER (OUTPATIENT)
Age: 75
Setting detail: OUTPATIENT SURGERY
Discharge: HOME | End: 2020-06-30
Attending: INTERNAL MEDICINE | Admitting: INTERNAL MEDICINE
Payer: MEDICARE

## 2020-06-30 VITALS
SYSTOLIC BLOOD PRESSURE: 110 MMHG | RESPIRATION RATE: 17 BRPM | WEIGHT: 157.41 LBS | HEART RATE: 56 BPM | DIASTOLIC BLOOD PRESSURE: 65 MMHG | BODY MASS INDEX: 26.19 KG/M2 | OXYGEN SATURATION: 97 % | TEMPERATURE: 97.4 F

## 2020-06-30 DIAGNOSIS — I48.91 ATRIAL FIBRILLATION, UNSPECIFIED TYPE (HCC): ICD-10-CM

## 2020-06-30 LAB
ABO + RH BLD: NORMAL
ACT BLD: 224 SECS (ref 79–138)
ACT BLD: 257 SECS (ref 79–138)
BLOOD GROUP ANTIBODIES SERPL: NORMAL
SPECIMEN EXP DATE BLD: NORMAL

## 2020-06-30 PROCEDURE — 77030035291 HC TBNG PMP SMARTABLATE J&J -B: Performed by: INTERNAL MEDICINE

## 2020-06-30 PROCEDURE — 77030013797 HC KT TRNSDUC PRSSR EDWD -A: Performed by: INTERNAL MEDICINE

## 2020-06-30 PROCEDURE — C1894 INTRO/SHEATH, NON-LASER: HCPCS | Performed by: INTERNAL MEDICINE

## 2020-06-30 PROCEDURE — 85347 COAGULATION TIME ACTIVATED: CPT

## 2020-06-30 PROCEDURE — 74011000250 HC RX REV CODE- 250: Performed by: NURSE ANESTHETIST, CERTIFIED REGISTERED

## 2020-06-30 PROCEDURE — 77030041242 HC CBL CONN CARTO 3 J&J -D: Performed by: INTERNAL MEDICINE

## 2020-06-30 PROCEDURE — 77030020506 HC NDL TRNSPTL NRG BAYL -F: Performed by: INTERNAL MEDICINE

## 2020-06-30 PROCEDURE — 77030039046 HC PAD DEFIB RADIOTRNSPNT CNMD -B: Performed by: INTERNAL MEDICINE

## 2020-06-30 PROCEDURE — C1732 CATH, EP, DIAG/ABL, 3D/VECT: HCPCS | Performed by: INTERNAL MEDICINE

## 2020-06-30 PROCEDURE — 77030008684 HC TU ET CUF COVD -B: Performed by: NURSE ANESTHETIST, CERTIFIED REGISTERED

## 2020-06-30 PROCEDURE — C1730 CATH, EP, 19 OR FEW ELECT: HCPCS | Performed by: INTERNAL MEDICINE

## 2020-06-30 PROCEDURE — 93312 ECHO TRANSESOPHAGEAL: CPT

## 2020-06-30 PROCEDURE — 93662 INTRACARDIAC ECG (ICE): CPT | Performed by: INTERNAL MEDICINE

## 2020-06-30 PROCEDURE — 36415 COLL VENOUS BLD VENIPUNCTURE: CPT

## 2020-06-30 PROCEDURE — C1769 GUIDE WIRE: HCPCS | Performed by: INTERNAL MEDICINE

## 2020-06-30 PROCEDURE — C1893 INTRO/SHEATH, FIXED,NON-PEEL: HCPCS | Performed by: INTERNAL MEDICINE

## 2020-06-30 PROCEDURE — C1760 CLOSURE DEV, VASC: HCPCS | Performed by: INTERNAL MEDICINE

## 2020-06-30 PROCEDURE — C1759 CATH, INTRA ECHOCARDIOGRAPHY: HCPCS | Performed by: INTERNAL MEDICINE

## 2020-06-30 PROCEDURE — 93613 INTRACARDIAC EPHYS 3D MAPG: CPT | Performed by: INTERNAL MEDICINE

## 2020-06-30 PROCEDURE — 74011250636 HC RX REV CODE- 250/636: Performed by: NURSE ANESTHETIST, CERTIFIED REGISTERED

## 2020-06-30 PROCEDURE — 77030026438 HC STYL ET INTUB CARD -A: Performed by: NURSE ANESTHETIST, CERTIFIED REGISTERED

## 2020-06-30 PROCEDURE — 74011250637 HC RX REV CODE- 250/637: Performed by: INTERNAL MEDICINE

## 2020-06-30 PROCEDURE — 77030027107 HC PTCH EXT REF CARTO3 J&J -F: Performed by: INTERNAL MEDICINE

## 2020-06-30 PROCEDURE — 86900 BLOOD TYPING SEROLOGIC ABO: CPT

## 2020-06-30 PROCEDURE — 76060000035 HC ANESTHESIA 2 TO 2.5 HR: Performed by: INTERNAL MEDICINE

## 2020-06-30 PROCEDURE — 93656 COMPRE EP EVAL ABLTJ ATR FIB: CPT | Performed by: INTERNAL MEDICINE

## 2020-06-30 PROCEDURE — 77030029065 HC DRSG HEMO QCLOT ZMED -B: Performed by: INTERNAL MEDICINE

## 2020-06-30 PROCEDURE — 77030029359 HC PRB ESOPH TEMP CATH ANTM -F: Performed by: INTERNAL MEDICINE

## 2020-06-30 RX ORDER — ONDANSETRON 2 MG/ML
INJECTION INTRAMUSCULAR; INTRAVENOUS AS NEEDED
Status: DISCONTINUED | OUTPATIENT
Start: 2020-06-30 | End: 2020-06-30 | Stop reason: HOSPADM

## 2020-06-30 RX ORDER — PROTAMINE SULFATE 10 MG/ML
INJECTION, SOLUTION INTRAVENOUS AS NEEDED
Status: DISCONTINUED | OUTPATIENT
Start: 2020-06-30 | End: 2020-06-30 | Stop reason: HOSPADM

## 2020-06-30 RX ORDER — HEPARIN SODIUM 1000 [USP'U]/ML
INJECTION, SOLUTION INTRAVENOUS; SUBCUTANEOUS AS NEEDED
Status: DISCONTINUED | OUTPATIENT
Start: 2020-06-30 | End: 2020-06-30 | Stop reason: HOSPADM

## 2020-06-30 RX ORDER — SODIUM CHLORIDE 0.9 % (FLUSH) 0.9 %
5-40 SYRINGE (ML) INJECTION EVERY 8 HOURS
Status: DISCONTINUED | OUTPATIENT
Start: 2020-06-30 | End: 2020-06-30 | Stop reason: HOSPADM

## 2020-06-30 RX ORDER — PHENYLEPHRINE HCL IN 0.9% NACL 0.4MG/10ML
SYRINGE (ML) INTRAVENOUS AS NEEDED
Status: DISCONTINUED | OUTPATIENT
Start: 2020-06-30 | End: 2020-06-30 | Stop reason: HOSPADM

## 2020-06-30 RX ORDER — SUCCINYLCHOLINE CHLORIDE 20 MG/ML
INJECTION INTRAMUSCULAR; INTRAVENOUS AS NEEDED
Status: DISCONTINUED | OUTPATIENT
Start: 2020-06-30 | End: 2020-06-30 | Stop reason: HOSPADM

## 2020-06-30 RX ORDER — SODIUM CHLORIDE, SODIUM LACTATE, POTASSIUM CHLORIDE, CALCIUM CHLORIDE 600; 310; 30; 20 MG/100ML; MG/100ML; MG/100ML; MG/100ML
INJECTION, SOLUTION INTRAVENOUS
Status: DISCONTINUED | OUTPATIENT
Start: 2020-06-30 | End: 2020-06-30 | Stop reason: HOSPADM

## 2020-06-30 RX ORDER — ROCURONIUM BROMIDE 10 MG/ML
INJECTION, SOLUTION INTRAVENOUS AS NEEDED
Status: DISCONTINUED | OUTPATIENT
Start: 2020-06-30 | End: 2020-06-30 | Stop reason: HOSPADM

## 2020-06-30 RX ORDER — DEXAMETHASONE SODIUM PHOSPHATE 4 MG/ML
INJECTION, SOLUTION INTRA-ARTICULAR; INTRALESIONAL; INTRAMUSCULAR; INTRAVENOUS; SOFT TISSUE AS NEEDED
Status: DISCONTINUED | OUTPATIENT
Start: 2020-06-30 | End: 2020-06-30 | Stop reason: HOSPADM

## 2020-06-30 RX ORDER — PROPOFOL 10 MG/ML
INJECTION, EMULSION INTRAVENOUS AS NEEDED
Status: DISCONTINUED | OUTPATIENT
Start: 2020-06-30 | End: 2020-06-30 | Stop reason: HOSPADM

## 2020-06-30 RX ORDER — ONDANSETRON 2 MG/ML
4 INJECTION INTRAMUSCULAR; INTRAVENOUS
Status: DISCONTINUED | OUTPATIENT
Start: 2020-06-30 | End: 2020-06-30 | Stop reason: HOSPADM

## 2020-06-30 RX ORDER — LIDOCAINE HYDROCHLORIDE 20 MG/ML
INJECTION, SOLUTION EPIDURAL; INFILTRATION; INTRACAUDAL; PERINEURAL AS NEEDED
Status: DISCONTINUED | OUTPATIENT
Start: 2020-06-30 | End: 2020-06-30 | Stop reason: HOSPADM

## 2020-06-30 RX ORDER — ACETAMINOPHEN 325 MG/1
650 TABLET ORAL
Status: DISCONTINUED | OUTPATIENT
Start: 2020-06-30 | End: 2020-06-30 | Stop reason: HOSPADM

## 2020-06-30 RX ORDER — PANTOPRAZOLE SODIUM 40 MG/1
40 TABLET, DELAYED RELEASE ORAL DAILY
Qty: 30 TAB | Refills: 0 | Status: SHIPPED | OUTPATIENT
Start: 2020-06-30 | End: 2020-07-01

## 2020-06-30 RX ORDER — SODIUM CHLORIDE 0.9 % (FLUSH) 0.9 %
5-40 SYRINGE (ML) INJECTION AS NEEDED
Status: DISCONTINUED | OUTPATIENT
Start: 2020-06-30 | End: 2020-06-30 | Stop reason: HOSPADM

## 2020-06-30 RX ORDER — HYDROCODONE BITARTRATE AND ACETAMINOPHEN 5; 325 MG/1; MG/1
1 TABLET ORAL
Status: DISCONTINUED | OUTPATIENT
Start: 2020-06-30 | End: 2020-06-30 | Stop reason: HOSPADM

## 2020-06-30 RX ADMIN — Medication 120 MCG: at 12:04

## 2020-06-30 RX ADMIN — PROTAMINE SULFATE 40 MG: 10 INJECTION, SOLUTION INTRAVENOUS at 13:33

## 2020-06-30 RX ADMIN — PROPOFOL 150 MG: 10 INJECTION, EMULSION INTRAVENOUS at 12:00

## 2020-06-30 RX ADMIN — PROPOFOL 50 MG: 10 INJECTION, EMULSION INTRAVENOUS at 12:18

## 2020-06-30 RX ADMIN — ROCURONIUM BROMIDE 5 MG: 10 SOLUTION INTRAVENOUS at 12:00

## 2020-06-30 RX ADMIN — ONDANSETRON HYDROCHLORIDE 4 MG: 2 INJECTION, SOLUTION INTRAMUSCULAR; INTRAVENOUS at 13:33

## 2020-06-30 RX ADMIN — LIDOCAINE HYDROCHLORIDE 100 MG: 20 INJECTION, SOLUTION EPIDURAL; INFILTRATION; INTRACAUDAL; PERINEURAL at 12:00

## 2020-06-30 RX ADMIN — HEPARIN SODIUM 6000 UNITS: 1000 INJECTION, SOLUTION INTRAVENOUS; SUBCUTANEOUS at 12:26

## 2020-06-30 RX ADMIN — PHENYLEPHRINE HYDROCHLORIDE 20 MCG/MIN: 10 INJECTION INTRAVENOUS at 12:17

## 2020-06-30 RX ADMIN — HEPARIN SODIUM 5000 UNITS: 1000 INJECTION, SOLUTION INTRAVENOUS; SUBCUTANEOUS at 12:46

## 2020-06-30 RX ADMIN — SODIUM CHLORIDE, POTASSIUM CHLORIDE, SODIUM LACTATE AND CALCIUM CHLORIDE: 600; 310; 30; 20 INJECTION, SOLUTION INTRAVENOUS at 11:50

## 2020-06-30 RX ADMIN — DEXAMETHASONE SODIUM PHOSPHATE 4 MG: 4 INJECTION, SOLUTION INTRAMUSCULAR; INTRAVENOUS at 12:13

## 2020-06-30 RX ADMIN — Medication 120 MCG: at 12:07

## 2020-06-30 RX ADMIN — HEPARIN SODIUM 3000 UNITS: 1000 INJECTION, SOLUTION INTRAVENOUS; SUBCUTANEOUS at 13:11

## 2020-06-30 RX ADMIN — Medication 80 MCG: at 12:00

## 2020-06-30 RX ADMIN — RIVAROXABAN 20 MG: 20 TABLET, FILM COATED ORAL at 17:38

## 2020-06-30 RX ADMIN — SUCCINYLCHOLINE CHLORIDE 140 MG: 20 INJECTION, SOLUTION INTRAMUSCULAR; INTRAVENOUS at 12:00

## 2020-06-30 NOTE — DISCHARGE INSTRUCTIONS
Electrophysiology Study (EPS)/Cardiac Ablation   Discharge Instructions      You have just had a Cardiac Ablation for atrial fibrillation. There were catheters temporarily placed in your heart through a puncture in the Veins and/or Arteries in your groin. WHAT TO EXPECT      If you have had a Cardiac Ablation please be aware that you may experience mild chest pain that will resolve within 24 hours. If the Chest Pain begins radiating down your shoulders or arms, becomes severe or breathing becomes difficult, call 911 or go to the closest emergency room.  Mild to moderate, non-painful, bruising at the puncture site is not un-common, and will resolve in 7 - 10 days.  If a closure device was used to seal your artery or vein, a separate pamphlet will be given to you with these discharge instructions. It is very important that you review the information in the pamphlet for different restrictions and precautions.  You have a small gauze dressing applied to the puncture site in your groin. You may remove this the following morning. MEDICATIONS      Take only the medications prescribed to you at discharge. ACTIVITY      A responsible adult must take you home. Do not drive a car for 24 hours.  Rest quietly for the remainder of the day.  Do not lift anything greater than 10 pounds for 3 days.  Limit bending at the puncture site and use of stairs for at least 3 days.  You may remove the bandage and shower the morning after the procedure. Do not take a bath for 3 days. SYMPTOMS THAT NEED TO BE REPORTED IMMEDIATELY      Bleeding at the puncture site. If there is bleeding, lie down and hold firm direct pressure for at least 5 minutes. If the bleeding does not stop, go to the closest emergency room, or call 911.  Temperature more than 100.5 F.   Redness or warmth at the puncture site.    Increasing pain, numbness, coolness or blue discoloration of the extremity where the puncture is located.  Pulsating mass at the puncture site.  A new lump at the puncture site, or increasing swelling at the site.  Bruising at the puncture site that enlarges or becomes painful (some bruising at the site is common and will go away in 7 - 10 days).  Rapid heart rate or palpitations.  Dizziness, lightheadedness, fainting.  REMEMBER: If you feel something is an emergency or cannot be handled over the phone, call 911 or go to the closest emergency room.       FOLLOW UP CARE     Radha Galan in 2 weeks    Dr. Carly Perea in 3 months          Brooke Engle MD  Cardiac Electrophysiology / Cardiology    [address]  [address] [place] [address]  [phone] / [phone] Fax   [phone] / [phone] Fax

## 2020-06-30 NOTE — Clinical Note
Transseptal Cath Performed check box under hemodynamic and ICE and Fluoro, utilizing a standard needle, [place] 98cm via a guiding sheath.

## 2020-06-30 NOTE — Clinical Note
Anesthesia present for case. Refer to anesthesia log for vitals, airway management, LOC, and oxygen administration.

## 2020-06-30 NOTE — Clinical Note
under hemodynamic and ICE and Fluoro, utilizing a standard needle, [place] 98cm via a guiding sheath. Needle removed.

## 2020-06-30 NOTE — PROCEDURES
Cardiac Electrophysiology Report      PATIENT INFORMATION        Patient Name: Edwin Hutson  MRN: [id number]              Study Date: 6/30/2020    YOB: 1945   Age: 66 y.o. Gender: female      Procedure:  Atrial Fibrillation AblationRa    Referring Physician:  Mindy Clarke NP and Dr. Jeannie Brandt     Duty Name   Electrophysiologist Megan Nelson MD   Monitor Anesthesia   Circulator Rossana Spivey RN; Abdias Mcmahon, RN; Suyapa Samuel, RN; Karine Lo RN       PATIENT HISTORY     Rickey Jordan a 66 y. o. female who was seen by synchronous (real-time) audio-video technology on 6/5/2020 with paroxysmal atrial fibrillation status post pulmonary vein isolation in 2015 in [place], hypothyroidism and severe anemia for which she has undergone multiple iron transfusions. Jacob Ware was previously on rivaroxaban for CVA risk reduction of her atrial fibrillation however this was discontinued. Nuclear stress test in 5/2017 demonstrated preserved LV function without ischemia. She underwent holter monitor which demonstrated 20% PVC burden and failed to reveal atrial fibrillation. She was offered Flecainide 50mg BID however she was hesitant to start this. She was seen by hematologist who felt that her anemia was not driving her symptoms.  We started a trial of low-dose Toprol which has been ineffective in improving her symptoms and was discontinued. She underwent Watchman implantation and experienced AF with RVR post procedure. Echocardiogram showed trivial effusion only. She was discharged on amiodarone and subsequently converted to SR. She underwent monitoring which failed to reveal recurrence of AF while off amiodarone. Her plavix was discontinued and she has continued asprin. She presented to the ER with AF with RVR and was given cardizem 20 mg IV and was discharged with metoprolol being added to her regimen. She remains in AF but rates are in the 90s.      A previously performed transesophageal echocardiogram demonstrated the left atrium was free of visualized thrombus. The WATCHMAN remained in adequate position. PROCEDURE     The patient was brought to the Cardiac Electrophysiology laboratory in a post-absorptive, fasting state. Informed consent was obtained. A peripheral IV was in place. Continuous electrocardiographic, blood pressure, O2 saturation and  CO2 monitoring was initiated. Self-adhesive cardioversion patches were positioned on the chest. General anesthesia was effectuated by the anesthesia service. The patient was then prepped and draped in the usual sterile fashion. A multi-planar lubricated MARIA L probe was advanced to the oropharynx and into the esophagus without difficulty. Limited views were obtained visualizing the left atrium and left atrial appendage. The left atrial appendage was visualized in multiple views and demonstrated the LA appendage was free of visualized thrombus. The MARIA L probe was then removed without difficulty. Both groins were infiltrated with a 50/50 mixture of Lidocaine (1%) and bupivicaine (0.5%). Vascular access was obtained and a steerable sheath, SL0 sheath and an 8F sheath were placed in the right common femoral vein using the modified Seldinger technique. Through these sheaths, an 8F phased-array intracardiac ultrasound catheter was advanced to the right atrium where the surrounding structures were visualized and the fossa ovalis was localized. Through the sheaths, two 0.032, 145-cm Cook J wires were advanced to the level of the superior vena cava. After the guidewire was withdrawn, a Luisa transseptal needle was introduced into the sheath. The needle/sheath assembly was withdrawn under fluoroscopic and ICE guidance until the tip of the sheath prolapsed into the fossa ovalis. Appropriate positioning was confirmed with multiple fluoroscopic views and ICE imaging.  Transseptal access was obtained following delivery of RF energy with the [facility]. The dilator was advanced over the wire, followed by the sheath. The dilator and wire were removed. A circular duodecapolar Lasso mapping catheter was advanced into the left atrium. Systemic heparinization was initiated and the sheath was flushed continuously with heparinized saline. Anticoagulation status was monitored with frequent ACT measurements. Heparin was given in interrupted doses to maintain an ACT > 300 sec. Using the CARTO3 mapping system, detailed mapping was performed and a 3D electroanatomical, geometric rendering of the left atrium was created. The Lasso catheter was then exchanged for a 3.5 mm, bi-directional, externally-irrigated contact-force sensing ablation catheter and further detailed mapping was completed. This electroanatomic map was then merged with a previously obtained cardiac MRI. Over the second 0.032  145-cm wire, a second transseptal access was obtained. The ICE catheter was then exchanged for a 7F octapolar electrode catheter which was utilized to catheterize the coronary sinus for left atrial activity recording. Esophageal temperatures were monitored using a temperature probe. The height of the esophageal temperature probe was adjusted throughout the procedure in relation to the position of the ablation catheter. Wide antral circumferential ablation around the left and right pulmonary veins was performed. Using the Lasso mapping catheter, sites of persistent conduction were sought and further ablation was performed when necessary. Pacing was then performed along the line of ablation around the pulmonary veins and further ablation was performed at sites where persistent atrial capture was demontrated until capture was no longer evident. Entrance and exit block across all four pulmonary veins was demonstrated. At the completion of the study, both transseptal sheaths were withdrawn to the RA.  Catheters were placed in the RA and RV for recording and pacing as well as the His position for pacing/recording in an attempt to induce arrhythmia. A comprehensive electrophysiology study was performed. There was no pericardial effusion noted at the conclusion of the procedure. Following a test dose, protamine 40 mg was administered and once the ACT was found to be < 180 seconds, all catheters and sheaths were removed and hemostasis obtained by utilizing suture mediated closure devices. The patient was extubated, hemodynamically stable, tolerated the procedure well and was transferred in stable condition. There were no immediate complications encountered during the procedure. There was minimal blood loss and no specimen were removed. CONDUCTION INTERVALS       ms  QRS 98 ms   ms  AH 66 ms  HV 41 ms  VV 1237 ms  AV  ms      FINDINGS     1. The baseline ECG revealed atrial fibrillation  2. DCCV was attempted twice however failed to restore sinus rhythm  3. All pulmonary veins were successfully mapped using 3D mapping and ICE imaging. 4. Wide antral circumferential ablation was performed sucessfully. 5. DCCV was performed and successfully restored sinus rhythm  6. Entrance and exit block across all four pulmonary veins was demonstrated. RADIOLOGY SUMMARY       Total    Fluoro Time (minutes)  1.6    Dose Area Product (mGy)  52        CONCLUSIONS      1. Successful atrial fibrillation ablation. 2. Anticoagulation with eliquis, first dose tonight  3. GI acid suppression for prophylactic esophageal protection (Protonix 40 mg po daily x 1 month). 4. Resume all other home medications though may consider tapering in future  5. Follow up in 2 weeks in EP clinic with Dr. Moreno Nicolas   6. Follow up with Briana Daniel NP and Dr. Merlene Ledezma as scheduled. Thank you, Briana Daniel NP and Dr. Merlene Ledezma for allowing me to participate in the care of this extraordinarily pleasant female.        Missy Campbell MD  Cardiac Electrophysiology / Cardiology    [facility].  [address] [facility], [address] [facility] [address], [address], [facility] [address]                [address]  [phone] / [phone] Fax   [phone] / [phone] Fax

## 2020-06-30 NOTE — Clinical Note
under hemodynamic and ICE and Fluoro, utilizing a standard needle, Reynaldo 98cm via a guiding sheath. Needle inserted.

## 2020-06-30 NOTE — ROUTINE PROCESS
Cardiac Cath Lab Recovery Arrival Note: 
 
 
Shekhar Perez arrived to Cardiac Cath Lab, Recovery Area. Staff introduced to patient. Patient identifiers verified with NAME and DATE OF BIRTH. Procedure verified with patient. Consent forms reviewed and signed by patient or authorized representative and verified. Allergies verified. Patient informed of procedure and plan of care. Questions answered with review. Patient prepped for procedure, per orders from physician, prior to arrival. 
 
Patient on cardiac monitor, non-invasive blood pressure, SPO2 monitor. Patient is A&Ox 4. Patient reports no pain, no chest pain, non/v. Patient in stretcher, in low position, with side rails up, call bell within reach, patient instructed to call of assistance as needed. Patient prep in: [facility] Recovery Area, Bed# 7. Family in: Daughter: Dayron Meneses: [phone].   
Prep by: Shaq Anderson RN

## 2020-06-30 NOTE — ANESTHESIA POSTPROCEDURE EVALUATION
Post-Anesthesia Evaluation and Assessment    Patient: Troy Mobley MRN: [id number]  SSN: [id number]    YOB: 1945  Age: 66 y.o. Sex: female      I have evaluated the patient and they are stable and ready for discharge from the PACU. Cardiovascular Function/Vital Signs  Visit Vitals  /56 (BP 1 Location: Left arm, BP Patient Position: At rest)   Pulse (!) 53   Temp 36.3 °C (97.4 °F)   Resp 16   SpO2 95%   Breastfeeding No       Patient is status post General anesthesia for Procedure(s):  ABLATION A-FIB  W COMPLETE EP STUDY  Ep 3d Mapping  Intracardiac Echocardiogram.    Nausea/Vomiting: None    Postoperative hydration reviewed and adequate. Pain:  Pain Scale 1: Numeric (0 - 10) (06/30/20 1149)  Pain Intensity 1: 0 (06/30/20 1149)   Managed    Neurological Status: At baseline    Mental Status, Level of Consciousness: Alert and  oriented to person, place, and time    Pulmonary Status:   O2 Device: CO2 nasal cannula (06/30/20 1402)   Adequate oxygenation and airway patent    Complications related to anesthesia: None    Post-anesthesia assessment completed. No concerns    Signed By: Carter Storey MD     June 30, 2020              Procedure(s):  ABLATION A-FIB  W COMPLETE EP STUDY  Ep 3d Mapping  Intracardiac Echocardiogram.    general    <BSHSIANPOST>    INITIAL Post-op Vital signs:   Vitals Value Taken Time   /72 6/30/2020  3:00 PM   Temp     Pulse 56 6/30/2020  3:07 PM   Resp 18 6/30/2020  3:07 PM   SpO2 99 % 6/30/2020  3:07 PM   Vitals shown include unvalidated device data.

## 2020-06-30 NOTE — ANESTHESIA PREPROCEDURE EVALUATION
Relevant Problems   No relevant active problems       Anesthetic History   No history of anesthetic complications            Review of Systems / Medical History  Patient summary reviewed, nursing notes reviewed and pertinent labs reviewed    Pulmonary  Within defined limits                 Neuro/Psych   Within defined limits      Psychiatric history     Cardiovascular  Within defined limits          Dysrhythmias : atrial fibrillation      Exercise tolerance: <4 METS  Comments: Size was normal. Systolic function was normal. Ejection  fraction was estimated in the range of 55 % to 60 %. There were no  regional wall motion abnormalities.  Wall thickness was normal.   GI/Hepatic/Renal  Within defined limits   GERD           Endo/Other  Within defined limits    Hypothyroidism  Cancer     Other Findings              Physical Exam    Airway  Mallampati: II  TM Distance: > 6 cm  Neck ROM: normal range of motion   Mouth opening: Normal     Cardiovascular  Regular rate and rhythm,  S1 and S2 normal,  no murmur, click, rub, or gallop             Dental  No notable dental hx       Pulmonary  Breath sounds clear to auscultation               Abdominal  GI exam deferred       Other Findings            Anesthetic Plan    ASA: 3  Anesthesia type: general          Induction: Intravenous  Anesthetic plan and risks discussed with: Patient

## 2020-06-30 NOTE — PROGRESS NOTES
Darell Austin ambulated @ 1537 (time) approximately 100 feet. Patient tolerated ambulation without adverse advents. right groin (right/left, groin/arm)  without bleeding or hematoma noted.

## 2020-06-30 NOTE — PROGRESS NOTES
I have reviewed discharge instructions with the patient. The patient verbalized understanding. Reviewed instructions with daughter as well. Copy of discharge instructions given to patient.

## 2020-06-30 NOTE — PROGRESS NOTES
TRANSFER - IN REPORT:    Verbal report received from ANTONINA Smith RN on Tash Jensen, Procedure: MARIA L/EPS/Afib Ablation , from the Cardiac Cath lab, for routine progression of care. Report consisted of patients Situation, Background, Assessment and Recommendations(SBAR). Information from the following report(s) Procedure Summary, MAR and Recent Results was reviewed with the receiving clinician. Opportunity for questions and clarification was provided. Assessment completed upon patients arrival to [address] [place] [address]. Cardiac Cath Lab Recovery Arrival Note:     Tash Jensen arrived to [facility] recovery area. Patient procedure= MARIA L/EPS/Ablation. Patient on cardiac monitor, non-invasive blood pressure, Patient status doing well without problems. Patient is Arousable&Ox 4. Patient reports no pain, no chest pain, no n/v. Procedure site without any bleeding and no hematoma.

## 2020-07-01 RX ORDER — PANTOPRAZOLE SODIUM 40 MG/1
TABLET, DELAYED RELEASE ORAL
Qty: 90 TAB | Refills: 0 | Status: SHIPPED | OUTPATIENT
Start: 2020-07-01

## 2020-07-01 NOTE — TELEPHONE ENCOUNTER
Per VO of Dr. Horn Cover    LOV: 6/5/2020    No future appointments. Requested Prescriptions     Pending Prescriptions Disp Refills    rivaroxaban (Xarelto) 20 mg tab tablet 28 Tab 0     Sig: Take 1 Tab by mouth daily.      Signed Prescriptions Disp Refills    pantoprazole (PROTONIX) 40 mg tablet 90 Tab 0     Sig: TAKE 1 TABLET BY MOUTH DAILY     Authorizing Provider: Anjali Cope     Ordering User: Michel Singh

## 2020-07-02 RX ORDER — METOPROLOL TARTRATE 25 MG/1
25 TABLET, FILM COATED ORAL 2 TIMES DAILY
Qty: 60 TAB | Refills: 3 | Status: SHIPPED | OUTPATIENT
Start: 2020-07-02 | End: 2020-10-13

## 2020-07-02 NOTE — TELEPHONE ENCOUNTER
Requested Prescriptions     Signed Prescriptions Disp Refills    metoprolol tartrate (LOPRESSOR) 25 mg tablet 60 Tab 3     Sig: Take 1 Tab by mouth two (2) times a day. Authorizing Provider: Jaguar Murray     Ordering User: Miguel Angel Alan     Refill per verbal order Dr. Brooke Engle.

## 2020-07-06 ENCOUNTER — TELEPHONE (OUTPATIENT)
Dept: CARDIOLOGY CLINIC | Age: 75
End: 2020-07-06

## 2020-07-06 NOTE — TELEPHONE ENCOUNTER
Patient stated that she has been experiencing some shortness of breath. She stated that she is unsure if she should be concerned and is requesting a return call. Please advise.     Phone #: [phone]  Thanks

## 2020-07-06 NOTE — TELEPHONE ENCOUNTER
Pt is S/P AFIB Ablation 06/30/20. Pt hx indicates former smoker, 1/2 pkg/day x 50 years. Recently quit 7/31/2019. Pt states she had SOB w/exhersion prior to ablation. Pt states she still has some SOB w/exhersion but it is improved. She thought it would be completely resolved post procedure. Pt denies CP, dizziness, , syncope and denies chest tightness w/deep breath. Advised pt will review her concerns w/Dr. Daly [facility].  Mihaela Cortes NP.

## 2020-07-08 DIAGNOSIS — I48.91 ATRIAL FIBRILLATION, UNSPECIFIED TYPE (HCC): ICD-10-CM

## 2020-07-08 DIAGNOSIS — Z98.890 S/P ABLATION OF ATRIAL FIBRILLATION: ICD-10-CM

## 2020-07-08 DIAGNOSIS — Z86.79 S/P ABLATION OF ATRIAL FIBRILLATION: ICD-10-CM

## 2020-07-17 ENCOUNTER — OFFICE VISIT (OUTPATIENT)
Dept: CARDIOLOGY CLINIC | Age: 75
End: 2020-07-17

## 2020-07-17 VITALS
HEART RATE: 54 BPM | DIASTOLIC BLOOD PRESSURE: 68 MMHG | WEIGHT: 151 LBS | BODY MASS INDEX: 25.16 KG/M2 | SYSTOLIC BLOOD PRESSURE: 94 MMHG | OXYGEN SATURATION: 98 % | HEIGHT: 65 IN

## 2020-07-17 DIAGNOSIS — R06.02 SOB (SHORTNESS OF BREATH): ICD-10-CM

## 2020-07-17 DIAGNOSIS — Z86.79 S/P ABLATION OF ATRIAL FIBRILLATION: ICD-10-CM

## 2020-07-17 DIAGNOSIS — I48.91 ATRIAL FIBRILLATION, UNSPECIFIED TYPE (HCC): Primary | ICD-10-CM

## 2020-07-17 DIAGNOSIS — Z98.890 S/P ABLATION OF ATRIAL FIBRILLATION: ICD-10-CM

## 2020-07-17 DIAGNOSIS — Z95.818 PRESENCE OF WATCHMAN LEFT ATRIAL APPENDAGE CLOSURE DEVICE: ICD-10-CM

## 2020-07-17 DIAGNOSIS — R53.82 CHRONIC FATIGUE: ICD-10-CM

## 2020-07-17 NOTE — Clinical Note
Margarita Holden- I had originally thought Dr. Denita Jaramillo would want to cardiovert and then plan on AV norbert ablation/PPM later on. I talked about both options with Ms. Orlando Erika, she was not very happy with the idea of being cardioverted again. Dr. Denita Jaramillo told me after clinic taht based on the difficulty of her af ablation, for this special circumstance he would recommend just moving straight to the AV norbert ablation/PPM. She will be happy with this. Please updated her and schedule her for procedure, not urgent.

## 2020-07-17 NOTE — PROGRESS NOTES
HISTORY OF PRESENTING ILLNESS      Mary Hickey is a 66 y.o. female with paroxysmal atrial fibrillation status post pulmonary vein isolation in 2015 in [place], hypothyroidism and severe anemia for which she has undergone multiple iron transfusions. She underwent Watchman implantation and experienced AF with RVR post procedure. Echocardiogram showed trivial effusion. She was discharged on amiodarone and subsequently converted to SR. She underwent monitoring which failed to reveal recurrence of AF while off amiodarone. Her plavix was discontinued and she has continued asprin. She presented to the ER again with AF with RVR and subsequently underwent AF ablation. She presents for follow up with recurrence of SOB. Her EKG shows atrial fibrillation.       PAST MEDICAL HISTORY     Past Medical History:   Diagnosis Date    Anemia     Atrial fibrillation ([address]     s/p pulm vein isolation 2015    Atrial fibrillation with rapid ventricular response ([address] 5/20/2017    Basal cell carcinoma     Depression     GERD (gastroesophageal reflux disease)     Hematuria 7/15/2019    Hypothyroidism     Ill-defined condition     bleeding in liver while on Xarelto    Tremor            PAST SURGICAL HISTORY     Past Surgical History:   Procedure Laterality Date    CARDIAC SURG PROCEDURE UNLIST  2015    cardioversionx4    CARDIAC SURG PROCEDURE UNLIST  2014    ABLATION     COLONOSCOPY N/A 9/6/2017    COLONOSCOPY performed by Phoenix Shaw MD at [address] [facility] COLONOSCOPY N/A 12/3/2019    COLONOSCOPY ( No Info to ) performed by Alfa Alicea MD at [address] [facility] HX AFIB ABLATION  2016    HX CATARACT REMOVAL Bilateral 2015    HX CHOLECYSTECTOMY  08/26/2016    HX [address]    HX GI      colonoscopy    HX ORTHOPAEDIC Bilateral 2001    arthroscopic knee surgery    HX POLYPECTOMY  2015     [address]  [address]  INTRACARD ECHO, THER/DX INTERVENT N/A 2020    Intracardiac Echocardiogram performed by Cordell Mancilla MD at [address], [facility]/s Dr CATH LAB    UT EPHYS EVL TRNSPTL TX ATRIAL FIB ISOLAT PULM VEIN N/A 2020    ABLATION A-FIB  W COMPLETE EP STUDY performed by Cordell Mancilla MD at [address] CATH LAB    UT INTRACARDIAC ELECTROPHYSIOLOGIC 3D MAPPING N/A 2020    Ep 3d Mapping performed by Cordell Mancilla MD at [address], [facility]/s Dr CATH LAB          ALLERGIES     No Known Allergies       FAMILY HISTORY     Family History   Problem Relation Age of Onset    Heart Disease Father     Cancer Paternal Aunt     Anesth Problems Neg Hx     negative for cardiac disease       SOCIAL HISTORY     Social History     Socioeconomic History    Marital status: SINGLE     Spouse name: Not on file    Number of children: Not on file    Years of education: Not on file    Highest education level: Not on file   Tobacco Use    Smoking status: Light Tobacco Smoker     Packs/day: 0.50     Years: 50.00     Pack years: 25.00     Types: Cigarettes     Last attempt to quit: 2019     Years since quittin.9    Smokeless tobacco: Never Used   Substance and Sexual Activity    Alcohol use: Not Currently     Frequency: Never     Comment: OCCA    Drug use: No    Sexual activity: Not Currently     Partners: Male         MEDICATIONS     Current Outpatient Medications   Medication Sig    metoprolol tartrate (LOPRESSOR) 25 mg tablet Take 1 Tab by mouth two (2) times a day.  pantoprazole (PROTONIX) 40 mg tablet TAKE 1 TABLET BY MOUTH DAILY    rivaroxaban (Xarelto) 20 mg tab tablet Take 1 Tab by mouth daily (with dinner).  dilTIAZem CD (CARDIZEM CD) 240 mg ER capsule Take 1 Cap by mouth daily.  levothyroxine (SYNTHROID) 100 mcg tablet Take 100 mcg by mouth Daily (before breakfast).  rivaroxaban (Xarelto) 20 mg tab tablet Take 1 Tab by mouth daily.     cholecalciferol (VITAMIN D3) (5000 Units/125 mcg) tab tablet Take 5,000 Units by mouth daily. No current facility-administered medications for this visit. I have reviewed the nurses notes, vitals, problem list, allergy list, medical history, family, social history and medications. REVIEW OF SYMPTOMS      General: Pt denies excessive weight gain or loss. Pt is able to conduct ADL's  HEENT: Denies blurred vision, headaches, hearing loss, epistaxis and difficulty swallowing. Respiratory: +sob, Denies cough, congestion,  wheezing or stridor. Cardiovascular: Denies precordial pain, palpitations, edema or PND  Gastrointestinal: Denies poor appetite, indigestion, abdominal pain or blood in stool  Genitourinary: Denies hematuria, dysuria, increased urinary frequency  Musculoskeletal: Denies joint pain or swelling from muscles or joints  Neurologic: Denies tremor, paresthesias, headache, or sensory motor disturbance  Psychiatric: Denies confusion, insomnia, depression  Integumentray: Denies rash, itching or ulcers. Hematologic: Denies easy bruising, bleeding       PHYSICAL EXAMINATION      Vitals: see vitals section  General: Well developed, in no acute distress. HEENT: No jaundice, oral mucosa moist, no oral ulcers  Neck: Supple, no stiffness, no lymphadenopathy, supple  Heart:  +irr irr, Normal S1/S2 negative S3 or S4. Regular, no murmur, gallop or rub, no jugular venous distention  Respiratory: Clear bilaterally x 4, no wheezing or rales  Abdomen:   Soft, non-tender, bowel sounds are active. Extremities:  No edema, normal cap refill, no cyanosis. Musculoskeletal: No clubbing, no deformities  Neuro: A&Ox3, speech clear, gait stable, cooperative, no focal neurologic deficits  Skin: Skin color is normal. No rashes or lesions.  Non diaphoretic, moist.  Vascular: 2+ pulses symmetric in all extremities       DIAGNOSTIC DATA      EKG: atrial fibrillation       LABORATORY DATA      Lab Results   Component Value Date/Time    WBC 7.5 06/04/2020 05:28 PM    HGB 12.7 06/04/2020 05:28 PM HCT 39.8 06/04/2020 05:28 PM    PLATELET 646 83/92/9344 05:28 PM    MCV 87.7 06/04/2020 05:28 PM      Lab Results   Component Value Date/Time    Sodium 140 06/04/2020 05:28 PM    Potassium 4.2 06/04/2020 05:28 PM    Chloride 111 (H) 06/04/2020 05:28 PM    CO2 25 06/04/2020 05:28 PM    Anion gap 4 (L) 06/04/2020 05:28 PM    Glucose 99 06/04/2020 05:28 PM    BUN 27 (H) 06/04/2020 05:28 PM    Creatinine 1.21 (H) 06/04/2020 05:28 PM    BUN/Creatinine ratio 22 (H) 06/04/2020 05:28 PM    GFR est AA 52 (L) 06/04/2020 05:28 PM    GFR est non-AA 43 (L) 06/04/2020 05:28 PM    Calcium 10.1 06/04/2020 05:28 PM    Bilirubin, total 0.3 07/11/2019 10:45 AM    Alk. phosphatase 82 07/11/2019 10:45 AM    Protein, total 6.6 07/11/2019 10:45 AM    Albumin 3.5 07/11/2019 10:45 AM    Globulin 3.1 07/11/2019 10:45 AM    A-G Ratio 1.1 07/11/2019 10:45 AM    ALT (SGPT) 14 07/11/2019 10:45 AM           ASSESSMENT         1. Atrial fibrillation                                         A. Paroxysmal                        B. PVI 2015                        C. HASBLED 2                        D. WATCHMAN 7/18/19  2. Severe anemia  3. Hypothyroidism  4. Premature ventricular contractions                        A.  Symptomatic  5. Diverticulosis       PLAN       Plan discussed with Dr. Zuleima Cobb. Will defer cardioversion in favor of AV norbert ablation and PPM.         ICD-10-CM ICD-9-CM    1. Atrial fibrillation, unspecified type (HCC)  I48.91 427.31 AMB POC EKG ROUTINE W/ 12 LEADS, INTER & REP   2. S/P ablation of atrial fibrillation  Z98.890 V45.89     Z86.79     3. Presence of Watchman left atrial appendage closure device  Z95.818 V45.09    4. SOB (shortness of breath)  R06.02 786.05    5.  Chronic fatigue  R53.82 780.79      Orders Placed This Encounter    AMB POC EKG ROUTINE W/ 12 LEADS, INTER & REP     Order Specific Question:   Reason for Exam:     Answer:   AFIB          FOLLOW-UP   After procedure      Thank you, Kevon Eric NP for allowing me to participate in the care of this extraordinarily pleasant female. Please do not hesitate to contact me for further questions/concerns.        LORIN Alfaro [address].  [address] [place] [address]  [phone] / [phone] Fax   [phone] / [phone] Fax

## 2020-07-17 NOTE — PROGRESS NOTES
Edwin Hutson is a 66 y.o. female    Visit Vitals  BP 94/68 (BP 1 Location: Left arm, BP Patient Position: Sitting)   Pulse (!) 54   Ht 5' 5\" (1.651 m)   Wt 151 lb (68.5 kg)   SpO2 98%   BMI 25.13 kg/m²       Chief Complaint   Patient presents with    Irregular Heart Beat     AFIB       Chest pain NO  SOB YES WALKING SHORT DISTANCES OR BENDING DOWN AND STANDING.    Dizziness YES WHEN STANDING UP OR BENDING   Swelling NO  Recent hospital visit NO  Refills NO

## 2020-07-21 ENCOUNTER — TELEPHONE (OUTPATIENT)
Dept: CARDIOLOGY CLINIC | Age: 75
End: 2020-07-21

## 2020-07-21 NOTE — TELEPHONE ENCOUNTER
Pt states she's in afib again and someone supposed to call her to set up a cardio conversion.  Please advise      [phone]

## 2020-07-21 NOTE — TELEPHONE ENCOUNTER
Called patient to clarify treatment plan. AV norbert ablation and PPM were discussed during her OV with me as a potential option which she preferred over cardioversion; however, patient did not recall this part of the conversation and was therefore upset regarding communication of her plan. Discussed AV norbert ablation and PPM procedures for which she verbalized understanding today. Offered her cardioversion if she'd prefer but that Dr. Angelika Augustin was willing to proceed with the AV norbert ablation and PPM without cardioversion first.     She plans to discuss with family and will notify us of her decision.      Krish Morrison, NP

## 2020-07-21 NOTE — TELEPHONE ENCOUNTER
Returned call to schedule procedures ordered from 7/17/20. Pt states she was unaware that she was to have AV Node Ablation and PPM placement. Pt became angry and insisted that she was to only have a cardioversion scheduled. Reviewed the office visit note/summary/path forward tx plan and pt appeared to become more upset. Advised I would reverify plan w/Dr. Rey Jolly & KALYN Murillo NP and call back.

## 2020-07-22 ENCOUNTER — TELEPHONE (OUTPATIENT)
Dept: CARDIOLOGY CLINIC | Age: 75
End: 2020-07-22

## 2020-07-22 DIAGNOSIS — I48.91 ATRIAL FIBRILLATION, UNSPECIFIED TYPE (HCC): Primary | ICD-10-CM

## 2020-07-22 RX ORDER — SODIUM CHLORIDE 0.9 % (FLUSH) 0.9 %
5-40 SYRINGE (ML) INJECTION EVERY 8 HOURS
Status: CANCELLED | OUTPATIENT
Start: 2020-07-22

## 2020-07-22 RX ORDER — SODIUM CHLORIDE 0.9 % (FLUSH) 0.9 %
5-40 SYRINGE (ML) INJECTION AS NEEDED
Status: CANCELLED | OUTPATIENT
Start: 2020-07-22

## 2020-07-22 NOTE — TELEPHONE ENCOUNTER
Discussed pre-procedure instructions. Pt will have COVID19 test at [facility], Friday, 07/24/20. Pt denies fever, chills, SOB, sore throat, diarrhea, new onset loss of taste or smell or recent exposure to COVID19 positive pt. Pt verbalized understanding of instructions.

## 2020-07-22 NOTE — LETTER
7/22/2020 12:26 PM 
 
Ms. Sofia Puri 
[address] [place] [address] [address] [id number] You are scheduled for a Cardioversion at [facility] on Monday, July 27, 2020. Please arrive at the patient registration desk located on the 2nd floor of the main building at 2:00 pm. 
 
Do not eat or drink anything after midnight the night before your procedure. You will need a . You may need to stay overnight, please come prepared to stay. You may take your normal medications with a sip of water the morning of your procedure. Blood thinner instructions: Continue Xarelto without stopping. Please call our office if you have any questions at [phone]. Please call the 
office if you develop any type of illness prior to your procedure. Sincerely, Arlen Ureña MD/Delmi Van, [facility]

## 2020-07-24 ENCOUNTER — TELEPHONE (OUTPATIENT)
Dept: CARDIOLOGY CLINIC | Age: 75
End: 2020-07-24

## 2020-07-24 ENCOUNTER — HOSPITAL ENCOUNTER (OUTPATIENT)
Dept: LAB | Age: 75
Discharge: HOME | End: 2020-07-24
Payer: MEDICARE

## 2020-07-24 DIAGNOSIS — I48.91 ATRIAL FIBRILLATION, UNSPECIFIED TYPE (HCC): ICD-10-CM

## 2020-07-24 PROCEDURE — 87635 SARS-COV-2 COVID-19 AMP PRB: CPT

## 2020-07-24 NOTE — ROUTINE PROCESS
2:40 PM called pt covid results pending, asked screening questions, pt aymptomatic and hasn't been around anyone with the virus, knows to be here at 8 am, npo past midnight

## 2020-07-25 LAB — SARS-COV-2, COV2NT: NOT DETECTED

## 2020-07-27 ENCOUNTER — APPOINTMENT (OUTPATIENT)
Dept: INTERVENTIONAL RADIOLOGY/VASCULAR | Age: 75
End: 2020-07-27
Attending: INTERNAL MEDICINE
Payer: MEDICARE

## 2020-07-27 ENCOUNTER — HOSPITAL ENCOUNTER (OUTPATIENT)
Age: 75
Setting detail: OUTPATIENT SURGERY
Discharge: HOME | End: 2020-07-27
Attending: INTERNAL MEDICINE | Admitting: INTERNAL MEDICINE
Payer: MEDICARE

## 2020-07-27 VITALS
WEIGHT: 149 LBS | TEMPERATURE: 98.5 F | SYSTOLIC BLOOD PRESSURE: 92 MMHG | OXYGEN SATURATION: 88 % | BODY MASS INDEX: 24.83 KG/M2 | DIASTOLIC BLOOD PRESSURE: 61 MMHG | HEIGHT: 65 IN | HEART RATE: 51 BPM | RESPIRATION RATE: 16 BRPM

## 2020-07-27 DIAGNOSIS — I48.91 ATRIAL FIBRILLATION, UNSPECIFIED TYPE (HCC): ICD-10-CM

## 2020-07-27 LAB
ANION GAP SERPL CALC-SCNC: 3 MMOL/L (ref 5–15)
BUN SERPL-MCNC: 28 MG/DL (ref 6–20)
BUN/CREAT SERPL: 26 (ref 12–20)
CALCIUM SERPL-MCNC: 9.3 MG/DL (ref 8.5–10.1)
CHLORIDE SERPL-SCNC: 109 MMOL/L (ref 97–108)
CO2 SERPL-SCNC: 29 MMOL/L (ref 21–32)
CREAT SERPL-MCNC: 1.07 MG/DL (ref 0.55–1.02)
GLUCOSE SERPL-MCNC: 112 MG/DL (ref 65–100)
MAGNESIUM SERPL-MCNC: 2.3 MG/DL (ref 1.6–2.4)
POTASSIUM SERPL-SCNC: 4.1 MMOL/L (ref 3.5–5.1)
SODIUM SERPL-SCNC: 141 MMOL/L (ref 136–145)

## 2020-07-27 PROCEDURE — 83735 ASSAY OF MAGNESIUM: CPT

## 2020-07-27 PROCEDURE — 80048 BASIC METABOLIC PNL TOTAL CA: CPT

## 2020-07-27 PROCEDURE — 92960 CARDIOVERSION ELECTRIC EXT: CPT | Performed by: INTERNAL MEDICINE

## 2020-07-27 PROCEDURE — 74011250636 HC RX REV CODE- 250/636: Performed by: INTERNAL MEDICINE

## 2020-07-27 PROCEDURE — 74011000250 HC RX REV CODE- 250: Performed by: INTERNAL MEDICINE

## 2020-07-27 PROCEDURE — 36415 COLL VENOUS BLD VENIPUNCTURE: CPT

## 2020-07-27 PROCEDURE — 99152 MOD SED SAME PHYS/QHP 5/>YRS: CPT | Performed by: INTERNAL MEDICINE

## 2020-07-27 PROCEDURE — 93312 ECHO TRANSESOPHAGEAL: CPT

## 2020-07-27 PROCEDURE — 77030018729 HC ELECTRD DEFIB PAD CARD -B: Performed by: INTERNAL MEDICINE

## 2020-07-27 RX ORDER — SODIUM CHLORIDE 0.9 % (FLUSH) 0.9 %
5-40 SYRINGE (ML) INJECTION EVERY 8 HOURS
Status: DISCONTINUED | OUTPATIENT
Start: 2020-07-27 | End: 2020-07-27 | Stop reason: HOSPADM

## 2020-07-27 RX ORDER — SODIUM CHLORIDE 0.9 % (FLUSH) 0.9 %
5-40 SYRINGE (ML) INJECTION AS NEEDED
Status: DISCONTINUED | OUTPATIENT
Start: 2020-07-27 | End: 2020-07-27 | Stop reason: HOSPADM

## 2020-07-27 RX ORDER — LIDOCAINE 50 MG/G
OINTMENT TOPICAL AS NEEDED
Status: DISCONTINUED | OUTPATIENT
Start: 2020-07-27 | End: 2020-07-27 | Stop reason: HOSPADM

## 2020-07-27 RX ORDER — MIDAZOLAM HYDROCHLORIDE 1 MG/ML
INJECTION, SOLUTION INTRAMUSCULAR; INTRAVENOUS AS NEEDED
Status: DISCONTINUED | OUTPATIENT
Start: 2020-07-27 | End: 2020-07-27 | Stop reason: HOSPADM

## 2020-07-27 RX ORDER — FENTANYL CITRATE 50 UG/ML
INJECTION, SOLUTION INTRAMUSCULAR; INTRAVENOUS AS NEEDED
Status: DISCONTINUED | OUTPATIENT
Start: 2020-07-27 | End: 2020-07-27 | Stop reason: HOSPADM

## 2020-07-27 RX ORDER — LIDOCAINE HYDROCHLORIDE 20 MG/ML
SOLUTION OROPHARYNGEAL AS NEEDED
Status: DISCONTINUED | OUTPATIENT
Start: 2020-07-27 | End: 2020-07-27 | Stop reason: HOSPADM

## 2020-07-27 NOTE — DISCHARGE INSTRUCTIONS
Patient Education        Electrical Cardioversion: What to Expect at Home  Your Recovery    Electrical cardioversion is a treatment for an abnormal heartbeat, such as atrial fibrillation, supraventricular tachycardia, or ventricular tachycardia (VT). It uses a brief electrical shock to reset your heart's rhythm. After cardioversion, you may have redness, like a sunburn, where the patches were. The medicines you got to make you sleepy may make you feel drowsy for the rest of the day. Your doctor may have you take medicines to help the heart beat normally and to prevent blood clots. This care sheet gives you a general idea about how long it will take for you to recover. But each person recovers at a different pace. Follow the steps below to feel better as quickly as possible. How can you care for yourself at home? Medicines  · Be safe with medicines. Take your medicines exactly as prescribed. Call your doctor if you think you are having a problem with your medicine. You may take one or more of the following medicines:  ? Rate-control medicines to slow the heart rate. These include beta-blockers, calcium channel blockers, and digoxin. ? Rhythm control medicines that help the heart keep a normal rhythm. ? Blood thinners, also called anticoagulants, which help prevent blood clots. You will get more details on the specific medicines your doctor prescribes. Be sure you know how to take your medicines safely. · Do not take any vitamins, over-the-counter medicines, or herbal products without talking to your doctor first.  Exercise  · Start light exercise if your doctor says that it is okay. Even a small amount will help you get stronger, have more energy, and manage your stress. Walking is an easy way to get exercise. Start out by walking a little more than you did in the hospital. Bit by bit, increase the amount you walk. · When you exercise, watch for signs that your heart is working too hard.  You are pushing too hard if you cannot talk while you are exercising. If you become short of breath or dizzy or have chest pain, sit down and rest right away. · Check your pulse regularly. Place two fingers on the artery at the palm side of your wrist in line with your thumb. If your heartbeat seems uneven or fast, talk to your doctor. Other instructions  · Ask your doctor when you can drive again. · Do not smoke. If you need help quitting, talk to your doctor about stop-smoking programs and medicines. These can increase your chances of quitting for good. · Limit alcohol. Follow-up care is a key part of your treatment and safety. Be sure to make and go to all appointments, and call your doctor if you are having problems. It's also a good idea to know your test results and keep a list of the medicines you take. When should you call for help? [id number] anytime you think you may need emergency care. For example, call if:  · You passed out (lost consciousness). · You have chest pain or pressure. This may occur with:  ? Sweating. ? Shortness of breath. ? Nausea or vomiting. ? Pain that spreads from the chest to the neck, jaw, or one or both shoulders or arms. ? A fast or uneven pulse. After calling 911, the  may tell you to chew 1 adult-strength or 2 to 4 low-dose aspirin. Wait for an ambulance. Do not try to drive yourself. · You have symptoms of a stroke. These may include:  ? Sudden numbness, tingling, weakness, or loss of movement in your face, arm, or leg, especially on only one side of your body. ? Sudden vision changes. ? Sudden trouble speaking. ? Sudden confusion or trouble understanding simple statements. ? Sudden problems with walking or balance. ? A sudden, severe headache that is different from past headaches. Call your doctor now or seek immediate medical care if:  · You feel dizzy or lightheaded, or you feel like you may faint. · You have a fast or irregular heartbeat.   Watch closely for any changes in your health, and be sure to contact your doctor if you have any problems. Where can you learn more? Go to http://martínez-renny.info/  Enter A617 in the search box to learn more about \"Electrical Cardioversion: What to Expect at Home. \"  Current as of: December 16, 2019               Content Version: 12.5  © 2366-2983 [employer]. Care instructions adapted under license by [employer] (which disclaims liability or warranty for this information). If you have questions about a medical condition or this instruction, always ask your healthcare professional. [address] any warranty or liability for your use of this information. Patient Education        Transesophageal Echocardiogram: What to Expect at Home  Your Recovery  A transesophageal echocardiogram is a test to help your doctor look at the inside of your heart. A small device called a transducer directs sound waves toward your heart. The sound waves make a picture of the heart's valves and chambers. Before the test, your throat was sprayed with medicine to numb it. Your throat may be sore for a few days. You may have had a sedative to help you relax. You may be unsteady after having sedation. It can take a few hours for the medicine's effects to wear off. Common side effects include nausea, vomiting, and feeling sleepy or tired. This care sheet gives you a general idea about how long it will take for you to recover. But each person recovers at a different pace. Follow the steps below to feel better as quickly as possible. How can you care for yourself at home? Activity  · If a sedative was used, your doctor will tell you when it is safe for you to do your normal activities. · For your safety, do not drive or operate any machinery that could be dangerous. Wait until the medicine wears off and you can think clearly and react easily.   Diet  · Do not eat or drink until the numbness in your throat wears off. · When the numbness is gone, you can eat your normal diet. Follow-up care is a key part of your treatment and safety. Be sure to make and go to all appointments, and call your doctor if you are having problems. It's also a good idea to know your test results and keep a list of the medicines you take. When should you call for help? [id number] anytime you think you may need emergency care. For example, call if:  · Your stools are maroon or very bloody. · You vomit blood or what looks like coffee grounds. Call your doctor now or seek immediate medical care if:  · You have pain in your chest, belly, or back. · You have new or worse trouble swallowing. · You have trouble breathing. Watch closely for changes in your health, and be sure to contact your doctor if you have any problems. Where can you learn more? Go to http://martínez-renny.info/  Enter X010 in the search box to learn more about \"Transesophageal Echocardiogram: What to Expect at Home. \"  Current as of: December 16, 2019               Content Version: 12.5  © 3088-2501 [employer]. Care instructions adapted under license by "[employer]" (which disclaims liability or warranty for this information). If you have questions about a medical condition or this instruction, always ask your healthcare professional. [address] any warranty or liability for your use of this information.

## 2020-07-27 NOTE — H&P
HISTORY OF PRESENTING ILLNESS      Tony Terry is a 66 y.o. female with paroxysmal atrial fibrillation status post pulmonary vein isolation in 2015 in [place], hypothyroidism and severe anemia for which she has undergone multiple iron transfusions. She underwent Watchman implantation and experienced AF with RVR post procedure. Echocardiogram showed trivial effusion. She was discharged on amiodarone and subsequently converted to SR. She underwent monitoring which failed to reveal recurrence of AF while off amiodarone. Her plavix was discontinued and she has continued asprin.   She presented to the ER again with AF with RVR and subsequently underwent AF ablation. She presents for follow up with recurrence of SOB.  Her EKG shows atrial fibrillation.       PAST MEDICAL HISTORY           Past Medical History:   Diagnosis Date    Anemia      Atrial fibrillation (HCC)       s/p pulm vein isolation 2015    Atrial fibrillation with rapid ventricular response ([address] 5/20/2017    Basal cell carcinoma      Depression      GERD (gastroesophageal reflux disease)      Hematuria 7/15/2019    Hypothyroidism      Ill-defined condition       bleeding in liver while on Xarelto    Tremor               PAST SURGICAL HISTORY            Past Surgical History:   Procedure Laterality Date    CARDIAC SURG PROCEDURE UNLIST   2015     cardioversionx4    CARDIAC SURG PROCEDURE UNLIST   2014     ABLATION     COLONOSCOPY N/A 9/6/2017     COLONOSCOPY performed by Negrita Ratliff MD at [facility] COLONOSCOPY N/A 12/3/2019     COLONOSCOPY ( No Info to ) performed by Jacob Schaefer MD at [facility] HX AFIB ABLATION   2016    HX CATARACT REMOVAL Bilateral 2015    HX CHOLECYSTECTOMY   08/26/2016    HX DILATION AND CURETTAGE   1989    HX GI         colonoscopy    HX ORTHOPAEDIC Bilateral 2001     arthroscopic knee surgery    HX POLYPECTOMY   2015    HX TONSILLECTOMY       HX TUBAL LIGATION       INTRACARD ECHO, THER/DX INTERVENT N/A 2020     Intracardiac Echocardiogram performed by Sixto Aase, MD at [address] [facility] [address] CATH LAB    [place] EPHYS EVL TRNSPTL TX ATRIAL FIB ISOLAT PULM VEIN N/A 2020     ABLATION A-FIB  W COMPLETE EP STUDY performed by Sixto Aase, MD at [address] CATH LAB    [place] INTRACARDIAC ELECTROPHYSIOLOGIC 3D MAPPING N/A 2020     Ep 3d Mapping performed by Sixto Aase, MD at [address] [facility] [address] CATH LAB            ALLERGIES      No Known Allergies         FAMILY HISTORY            Family History   Problem Relation Age of Onset    Heart Disease Father      Cancer Paternal Aunt      Anesth Problems Neg Hx      negative for cardiac disease         SOCIAL HISTORY      Social History               Socioeconomic History    Marital status: SINGLE       Spouse name: Not on file    Number of children: Not on file    Years of education: Not on file    Highest education level: Not on file   Tobacco Use    Smoking status: Light Tobacco Smoker       Packs/day: 0.50       Years: 50.00       Pack years: 25.00       Types: Cigarettes       Last attempt to quit: 2019       Years since quittin.9    Smokeless tobacco: Never Used   Substance and Sexual Activity    Alcohol use: Not Currently       Frequency: Never       Comment: OCCA    Drug use: No    Sexual activity: Not Currently       Partners: Male              MEDICATIONS           Current Outpatient Medications   Medication Sig    metoprolol tartrate (LOPRESSOR) 25 mg tablet Take 1 Tab by mouth two (2) times a day.  pantoprazole (PROTONIX) 40 mg tablet TAKE 1 TABLET BY MOUTH DAILY    rivaroxaban (Xarelto) 20 mg tab tablet Take 1 Tab by mouth daily (with dinner).  dilTIAZem CD (CARDIZEM CD) 240 mg ER capsule Take 1 Cap by mouth daily.  levothyroxine (SYNTHROID) 100 mcg tablet Take 100 mcg by mouth Daily (before breakfast).     rivaroxaban (Xarelto) 20 mg tab tablet Take 1 Tab by mouth daily.  cholecalciferol (VITAMIN D3) (5000 Units/125 mcg) tab tablet Take 5,000 Units by mouth daily.      No current facility-administered medications for this visit.         I have reviewed the nurses notes, vitals, problem list, allergy list, medical history, family, social history and medications.         REVIEW OF SYMPTOMS      General: Pt denies excessive weight gain or loss. Pt is able to conduct ADL's  HEENT: Denies blurred vision, headaches, hearing loss, epistaxis and difficulty swallowing. Respiratory: +sob, Denies cough, congestion,  wheezing or stridor. Cardiovascular: Denies precordial pain, palpitations, edema or PND  Gastrointestinal: Denies poor appetite, indigestion, abdominal pain or blood in stool  Genitourinary: Denies hematuria, dysuria, increased urinary frequency  Musculoskeletal: Denies joint pain or swelling from muscles or joints  Neurologic: Denies tremor, paresthesias, headache, or sensory motor disturbance  Psychiatric: Denies confusion, insomnia, depression  Integumentray: Denies rash, itching or ulcers. Hematologic: Denies easy bruising, bleeding         PHYSICAL EXAMINATION      Vitals: see vitals section  General: Well developed, in no acute distress. HEENT: No jaundice, oral mucosa moist, no oral ulcers  Neck: Supple, no stiffness, no lymphadenopathy, supple  Heart:  +irr irr, Normal S1/S2 negative S3 or S4. Regular, no murmur, gallop or rub, no jugular venous distention  Respiratory: Clear bilaterally x 4, no wheezing or rales  Abdomen:   Soft, non-tender, bowel sounds are active.   Extremities:  No edema, normal cap refill, no cyanosis. Musculoskeletal: No clubbing, no deformities  Neuro: A&Ox3, speech clear, gait stable, cooperative, no focal neurologic deficits  Skin: Skin color is normal. No rashes or lesions.  Non diaphoretic, moist.  Vascular: 2+ pulses symmetric in all extremities         DIAGNOSTIC DATA      EKG: atrial fibrillation       LABORATORY DATA            Lab Results   Component Value Date/Time     WBC 7.5 06/04/2020 05:28 PM     HGB 12.7 06/04/2020 05:28 PM     HCT 39.8 06/04/2020 05:28 PM     PLATELET 315 94/11/7224 05:28 PM     MCV 87.7 06/04/2020 05:28 PM            Lab Results   Component Value Date/Time     Sodium 140 06/04/2020 05:28 PM     Potassium 4.2 06/04/2020 05:28 PM     Chloride 111 (H) 06/04/2020 05:28 PM     CO2 25 06/04/2020 05:28 PM     Anion gap 4 (L) 06/04/2020 05:28 PM     Glucose 99 06/04/2020 05:28 PM     BUN 27 (H) 06/04/2020 05:28 PM     Creatinine 1.21 (H) 06/04/2020 05:28 PM     BUN/Creatinine ratio 22 (H) 06/04/2020 05:28 PM     GFR est AA 52 (L) 06/04/2020 05:28 PM     GFR est non-AA 43 (L) 06/04/2020 05:28 PM     Calcium 10.1 06/04/2020 05:28 PM     Bilirubin, total 0.3 07/11/2019 10:45 AM     Alk. phosphatase 82 07/11/2019 10:45 AM     Protein, total 6.6 07/11/2019 10:45 AM     Albumin 3.5 07/11/2019 10:45 AM     Globulin 3.1 07/11/2019 10:45 AM     A-G Ratio 1.1 07/11/2019 10:45 AM     ALT (SGPT) 14 07/11/2019 10:45 AM             ASSESSMENT         1. Atrial fibrillation                                         X. Paroxysmal                        E. PVI 2015                        C. HASBLED 2                        D. WATCHMAN 7/18/19  2. Severe anemia  3. Hypothyroidism  4. Premature ventricular contractions                        A.  Symptomatic  5.  Diverticulosis         PLAN      [facility]        [address], [address], [address] [place] [address], [address] [place] [address]  [phone] / [phone] Fax                            [phone] / [phone] Fax

## 2020-07-27 NOTE — PROGRESS NOTES
0921 Received patient from waiting area. Armband and allergies verbally confirmed with patient. Procedure explained and consents signed. 2867 TRANSFER - OUT REPORT:    Verbal report given to Gerald(name) on [phone]  being transferred to ep lab(unit) for routine progression of care       Report consisted of patients Situation, Background, Assessment and   Recommendations(SBAR). Information from the following report(s) Procedure Summary was reviewed with the receiving nurse. Lines:   Peripheral IV 07/27/20 Antecubital (Active)   Site Assessment Clean, dry, & intact 07/27/20 0854   Phlebitis Assessment 0 07/27/20 0854        Opportunity for questions and clarification was provided. Patient transported with:   Registered Nurse   [address] REPORT:    Verbal report received from Gerald(name) on [address]  being received from ep lab(unit) for routine progression of care      Report consisted of patients Situation, Background, Assessment and   Recommendations(SBAR). Information from the following report(s) Procedure Summary was reviewed with the receiving nurse. Opportunity for questions and clarification was provided. Assessment completed upon patients arrival to unit and care assumed. Wearing face mask, sinus yumiko 50's sitting up very sleepy barely opens eyes on command   1115 weaned to nasal cannula 2 liters more arousable , follows commands  1255 discharge instructions given to daughter and pt, both verbalized understanding preparing for discharge   1300 Copy of printed discharge instructions given to patient and   Daughter, Patient escorted via wheelchair to entrance. daughter driving. Patient discharged into care of daughter. Martinez Cuevas

## 2020-07-27 NOTE — PROCEDURES
Cardiac Electrophysiology Report        PATIENT INFORMATION     Patient Name: Viri Metz MRN: [id number]      Study Date: 2020    YOB: 1945  Age: 66 y.o. Gender: female      Procedure:  Constanza Kamara    Referring Physician:  Bronson Wilson NP        STAFF     Duty Name   Electrophysiologist Jag Mcfarlane MD   Monitor Carmen Molina RN   Circulator Alan Zarco RN; Genmarissa Proper, RCIS       PATIENT HISTORY     66year old female with AF s/p ablation x 2, Celia Rumple presenting with recurrent AF following repeat AF ablation. She missed a dose of anticoagulation recently. She presents for cardioversion. PROCEDURE     The patient was brought to the Cardiac Electrophysiology laboratory in a post-absorptive, fasting state. Informed consent was obtained. A peripheral IV was in place. Continuous electrocardiographic, blood pressure, O2 saturation and  CO2 monitoring was initiated. Self-adhesive cardioversion patches were positioned on the chest and back. Once conscious sedation was achieved, a single biphasic, synchronized shock at 360 Joules was delivered with successful restoration of sinus rhythm. The patient remained hemodynamically stable, tolerated the procedure well and was transferred in stable condition. There were no immediate complications encountered during the procedure. There was no blood loss and no specimen were removed. CARDIOVERSION DATA     Energy (Joules) Technique Result   360 Biphasic Success       MEDICATION SUMMARY     Medication Route Unit Total   Fentanyl IV micrograms    Versed IV grams 100   Brevital IV milligrams 7       RADIOLOGY SUMMARY     N/A      CONCLUSIONS     1. Successful restoration of sinus rhythm. 2. Continue anticoagulation; should symptomatic AF and/or RVR recur, consider PPM/AV node ablation in future. 3. Follow up with  Bronson Wilson NP as scheduled and in EP clinic 2 weeks.           Thank you, Faina Hernandez, Soco Beltran NP  for involving me in the care of this extraordinarily pleasant female.          Meño Hensley MD  Cardiac Electrophysiology / Cardiology    [place] [address].  [facility], [facility], [address]  [phone] / [phone] Fax      [phone] / [phone] Fax

## 2020-07-27 NOTE — Clinical Note
TRANSFER - OUT REPORT:     Verbal report given to: Refugio Partida. Report consisted of patient's Situation, Background, Assessment and   Recommendations(SBAR). Opportunity for questions and clarification was provided. Patient transported to: Demi Ta.

## 2020-07-31 ENCOUNTER — TELEPHONE (OUTPATIENT)
Dept: CARDIOLOGY CLINIC | Age: 75
End: 2020-07-31

## 2020-07-31 NOTE — TELEPHONE ENCOUNTER
Pt called back at 10:30 am to advise that her daughter has arrived and tried to take her vital signs. She states her monitor would not assess a B/P reading,  indicating per the pt, that her B/P is too low to record. Pt states her B/P 100%. Pt states her HR is in the 50s. Pt states she has a hx of anemia & asks if this could contribute to her symptoms. Advised pt that it could. Advised pt I have not heard back from one of our covering providers but with her current complaints would an advise her to seek medical attention from ED. Pt verbalized understanding  but stated she may call her GP to seek their advise.

## 2020-07-31 NOTE — TELEPHONE ENCOUNTER
----- Message from [facility] sent at 7/31/2020  9:46 AM EDT -----  Francisco Javier Collins,    See below:    Pt s/p Afib ablation 06/30/20  Pt s/p Cardioversion [facility] 07/27/20  Received phone call from pt stating that she believes she is back in Afib again. She states for the past couple days she began feeling some fatigue, lightheadedness, & \"some breathlessness\". She states when she awoke this morning she felt worse w/more fatigue, lightheadedness, and SOB w/exersion. She states her B/P this am is 90/60 & is unaware of her heart rate. Pt denies CP, palpitations, edema. Pt states this is how she feels when back in Afib. Advised pt Dr. Fernando Ortega. Isabel Poles are not available today but will review w/provider covering our service. Advised pt if symptoms worsen to prior to me calling back to seek immediate tx at ED. Pt verbalizes understanding.

## 2020-07-31 NOTE — TELEPHONE ENCOUNTER
Pt s/p Afib ablation 06/30/20  Pt s/p Cardioversion [facility] 07/27/20  Received phone call from pt stating that she believes she is back in Afib again. She states for the past couple days she began feeling some fatigue, lightheadedness, & \"some breathlessness\". She states when she awoke this morning she felt worse w/more fatigue, lightheadedness, and SOB w/exersion. She states her B/P this am is 90/60 & is unaware of her heart rate. Pt denies CP, palpitations, edema. Pt states this is how she feels when back in Afib. Advised pt Dr. Wil Rubin. Leah Morejon are not available today but will review w/provider covering our service. Advised pt if symptoms worsen to prior to me calling back to seek immediate tx at ED. Pt verbalizes understanding.

## 2020-08-04 ENCOUNTER — OFFICE VISIT (OUTPATIENT)
Dept: CARDIOLOGY CLINIC | Age: 75
End: 2020-08-04
Payer: MEDICARE

## 2020-08-04 VITALS
SYSTOLIC BLOOD PRESSURE: 94 MMHG | DIASTOLIC BLOOD PRESSURE: 62 MMHG | BODY MASS INDEX: 25.12 KG/M2 | OXYGEN SATURATION: 98 % | HEIGHT: 65 IN | WEIGHT: 150.8 LBS | RESPIRATION RATE: 18 BRPM | HEART RATE: 56 BPM

## 2020-08-04 DIAGNOSIS — I48.91 ATRIAL FIBRILLATION, UNSPECIFIED TYPE (HCC): Primary | ICD-10-CM

## 2020-08-04 DIAGNOSIS — Z95.818 PRESENCE OF WATCHMAN LEFT ATRIAL APPENDAGE CLOSURE DEVICE: ICD-10-CM

## 2020-08-04 DIAGNOSIS — Z98.890 S/P ABLATION OF ATRIAL FIBRILLATION: ICD-10-CM

## 2020-08-04 DIAGNOSIS — R06.02 SOB (SHORTNESS OF BREATH): ICD-10-CM

## 2020-08-04 DIAGNOSIS — R53.82 CHRONIC FATIGUE: ICD-10-CM

## 2020-08-04 DIAGNOSIS — Z86.79 S/P ABLATION OF ATRIAL FIBRILLATION: ICD-10-CM

## 2020-08-04 PROCEDURE — 99214 OFFICE O/P EST MOD 30 MIN: CPT | Performed by: NURSE PRACTITIONER

## 2020-08-04 PROCEDURE — G8432 DEP SCR NOT DOC, RNG: HCPCS | Performed by: NURSE PRACTITIONER

## 2020-08-04 PROCEDURE — G8419 CALC BMI OUT NRM PARAM NOF/U: HCPCS | Performed by: NURSE PRACTITIONER

## 2020-08-04 PROCEDURE — G8427 DOCREV CUR MEDS BY ELIG CLIN: HCPCS | Performed by: NURSE PRACTITIONER

## 2020-08-04 PROCEDURE — 1101F PT FALLS ASSESS-DOCD LE1/YR: CPT | Performed by: NURSE PRACTITIONER

## 2020-08-04 PROCEDURE — G8400 PT W/DXA NO RESULTS DOC: HCPCS | Performed by: NURSE PRACTITIONER

## 2020-08-04 PROCEDURE — 1090F PRES/ABSN URINE INCON ASSESS: CPT | Performed by: NURSE PRACTITIONER

## 2020-08-04 PROCEDURE — 93010 ELECTROCARDIOGRAM REPORT: CPT | Performed by: NURSE PRACTITIONER

## 2020-08-04 PROCEDURE — G8536 NO DOC ELDER MAL SCRN: HCPCS | Performed by: NURSE PRACTITIONER

## 2020-08-04 NOTE — PROGRESS NOTES
HISTORY OF PRESENTING ILLNESS      Alison Odom is a 66 y.o. female with paroxysmal atrial fibrillation status post pulmonary vein isolation in 2015 in [place], hypothyroidism and severe anemia for which she has undergone multiple iron transfusions. She underwent Watchman implantation and experienced AF with RVR post procedure. Echocardiogram showed trivial effusion. She was discharged on amiodarone and subsequently converted to SR. She underwent monitoring which failed to reveal recurrence of AF while off amiodarone. Her plavix was discontinued and she has continued asprin. She presented to the ER again with AF with RVR and subsequently underwent AF ablation. She presents for follow up with recurrence of SOB. Her EKG shows atrial fibrillation. At her follow up, we discussed DCCV versus AV norbert ablation/PPM. She underwent successful restoration of sinus rhythm via DCCV. She has had recurrence of sob and fatigue, dizziness with exertion. She saw her PCP for labwork (r/o anemia and thyroid issues) which we will request. EKG today shows sinus bradycardia. She has been taking lopressor 25mg once daily.      PAST MEDICAL HISTORY     Past Medical History:   Diagnosis Date    Anemia     Atrial fibrillation ([address]     s/p pulm vein isolation 2015    Atrial fibrillation with rapid ventricular response ([address] 5/20/2017    Basal cell carcinoma     Depression     GERD (gastroesophageal reflux disease)     Hematuria 7/15/2019    Hypothyroidism     Ill-defined condition     bleeding in liver while on Xarelto    Tremor            PAST SURGICAL HISTORY     Past Surgical History:   Procedure Laterality Date    CARDIAC SURG PROCEDURE UNLIST  2015    cardioversionx4    CARDIAC SURG PROCEDURE UNLIST  2014    ABLATION     COLONOSCOPY N/A 9/6/2017    COLONOSCOPY performed by Cori Rodriguez MD at [address] COLONOSCOPY N/A 12/3/2019    COLONOSCOPY ( No Info to ) performed by Ysabel Patricia Lubna Sanders MD at [address] [place] [address] HX AFIB ABLATION  2016    HX CATARACT REMOVAL Bilateral 2015    HX CHOLECYSTECTOMY  2016    HX DILATION AND CURETTAGE      HX GI      colonoscopy    HX ORTHOPAEDIC Bilateral     arthroscopic knee surgery    HX POLYPECTOMY      HX TONSILLECTOMY  194    HX TUBAL LIGATION      INTRACARD ECHO, THER/DX INTERVENT N/A 2020    Intracardiac Echocardiogram performed by Claudetta Escort, MD at [address], [facility] CATH LAB    HI CARDIOVERSION ELECTIVE ARRHYTHMIA EXTERNAL N/A 2020    EP CARDIOVERSION performed by Claudetta Escort, MD at [facility] CATH LAB    HI EPHYS EVL TRNSPTL TX ATRIAL FIB ISOLAT PULM VEIN N/A 2020    ABLATION A-FIB  W COMPLETE EP STUDY performed by Claudetta Escort, MD at [address], [facility] CATH LAB    HI INTRACARDIAC ELECTROPHYSIOLOGIC 3D MAPPING N/A 2020    Ep 3d Mapping performed by Claudetta Escort, MD at [address] [place] [address], [facility] CATH LAB          ALLERGIES     No Known Allergies       FAMILY HISTORY     Family History   Problem Relation Age of Onset    Heart Disease Father     Cancer Paternal Aunt     Anesth Problems Neg Hx     negative for cardiac disease       SOCIAL HISTORY     Social History     Socioeconomic History    Marital status: SINGLE     Spouse name: Not on file    Number of children: Not on file    Years of education: Not on file    Highest education level: Not on file   Tobacco Use    Smoking status: Light Tobacco Smoker     Packs/day: 0.25     Years: 50.00     Pack years: 12.50     Types: Cigarettes     Last attempt to quit: 2019     Years since quittin.0    Smokeless tobacco: Never Used    Tobacco comment: 1-2 cigarettes per day   Substance and Sexual Activity    Alcohol use: Not Currently     Frequency: Never     Comment: OCCA    Drug use: No    Sexual activity: Not Currently     Partners: Male         MEDICATIONS     Current Outpatient Medications   Medication Sig    metoprolol tartrate (LOPRESSOR) 25 mg tablet Take 1 Tab by mouth two (2) times a day. (Patient taking differently: Take 25 mg by mouth two (2) times a day. Patient taking once daily)    rivaroxaban (Xarelto) 20 mg tab tablet Take 1 Tab by mouth daily (with dinner).  cholecalciferol (VITAMIN D3) (5000 Units/125 mcg) tab tablet Take 5,000 Units by mouth daily.  dilTIAZem CD (CARDIZEM CD) 240 mg ER capsule Take 1 Cap by mouth daily.  levothyroxine (SYNTHROID) 100 mcg tablet Take 100 mcg by mouth Daily (before breakfast).  pantoprazole (PROTONIX) 40 mg tablet TAKE 1 TABLET BY MOUTH DAILY     No current facility-administered medications for this visit. I have reviewed the nurses notes, vitals, problem list, allergy list, medical history, family, social history and medications. REVIEW OF SYMPTOMS      General: +dizziness, fatigue, Pt denies excessive weight gain or loss. Pt is able to conduct ADL's  HEENT: Denies blurred vision, headaches, hearing loss, epistaxis and difficulty swallowing. Respiratory: +sob, Denies cough, congestion,  wheezing or stridor. Cardiovascular: Denies precordial pain, palpitations, edema or PND  Gastrointestinal: Denies poor appetite, indigestion, abdominal pain or blood in stool  Genitourinary: Denies hematuria, dysuria, increased urinary frequency  Musculoskeletal: Denies joint pain or swelling from muscles or joints  Neurologic: Denies tremor, paresthesias, headache, or sensory motor disturbance  Psychiatric: Denies confusion, insomnia, depression  Integumentray: Denies rash, itching or ulcers. Hematologic: Denies easy bruising, bleeding       PHYSICAL EXAMINATION      Vitals: see vitals section  General: Well developed, in no acute distress. HEENT: No jaundice, oral mucosa moist, no oral ulcers  Neck: Supple, no stiffness, no lymphadenopathy, supple  Heart:  Normal S1/S2 negative S3 or S4.  Regular, no murmur, gallop or rub, no jugular venous distention  Respiratory: Clear bilaterally x 4, no wheezing or rales  Abdomen:   Soft, non-tender, bowel sounds are active. Extremities:  No edema, normal cap refill, no cyanosis. Musculoskeletal: No clubbing, no deformities  Neuro: A&Ox3, speech clear, gait stable, cooperative, no focal neurologic deficits  Skin: Skin color is normal. No rashes or lesions. Non diaphoretic, moist.  Vascular: 2+ pulses symmetric in all extremities       DIAGNOSTIC DATA      EKG: sinus bradycardia       LABORATORY DATA      Lab Results   Component Value Date/Time    WBC 7.5 06/04/2020 05:28 PM    HGB 12.7 06/04/2020 05:28 PM    HCT 39.8 06/04/2020 05:28 PM    PLATELET 051 23/11/8847 05:28 PM    MCV 87.7 06/04/2020 05:28 PM      Lab Results   Component Value Date/Time    Sodium 141 07/27/2020 08:50 AM    Potassium 4.1 07/27/2020 08:50 AM    Chloride 109 (H) 07/27/2020 08:50 AM    CO2 29 07/27/2020 08:50 AM    Anion gap 3 (L) 07/27/2020 08:50 AM    Glucose 112 (H) 07/27/2020 08:50 AM    BUN 28 (H) 07/27/2020 08:50 AM    Creatinine 1.07 (H) 07/27/2020 08:50 AM    BUN/Creatinine ratio 26 (H) 07/27/2020 08:50 AM    GFR est AA >60 07/27/2020 08:50 AM    GFR est non-AA 50 (L) 07/27/2020 08:50 AM    Calcium 9.3 07/27/2020 08:50 AM    Bilirubin, total 0.3 07/11/2019 10:45 AM    Alk. phosphatase 82 07/11/2019 10:45 AM    Protein, total 6.6 07/11/2019 10:45 AM    Albumin 3.5 07/11/2019 10:45 AM    Globulin 3.1 07/11/2019 10:45 AM    A-G Ratio 1.1 07/11/2019 10:45 AM    ALT (SGPT) 14 07/11/2019 10:45 AM           ASSESSMENT       1. Atrial fibrillation                                         A. Paroxysmal                        B. PVI 2015                        C. HASBLED 2                        D. WATCHMAN 7/18/19  2. Severe anemia  3. Hypothyroidism  4. Premature ventricular contractions                        A.  Symptomatic  5. Diverticulosis     PLAN     Will switch to 12.5mg lopressor BID and then change formulary to Toprol daily at time of refill.  Will request lab results from PCP. Follow up with EKG in one month with Dr. Emma Cruz, consider AV norbert ablation/PPM for recurrence. ICD-10-CM ICD-9-CM    1. Atrial fibrillation, unspecified type ([address]  I48.91 427.31 AMB POC EKG ROUTINE W/ 12 LEADS, INTER & REP   2. SOB (shortness of breath)  R06.02 786.05 AMB POC EKG ROUTINE W/ 12 LEADS, INTER & REP   3. Chronic fatigue  R53.82 780.79 AMB POC EKG ROUTINE W/ 12 LEADS, INTER & REP   4. S/P ablation of atrial fibrillation  Z98.890 V45.89     Z86.79     5. Presence of Watchman left atrial appendage closure device  Z95.818 V45.09      Orders Placed This Encounter    AMB POC EKG ROUTINE W/ 12 LEADS, INTER & REP     Order Specific Question:   Reason for Exam:     Answer:   Irregular heart beat          FOLLOW-UP   1 month    Thank you, Winston Gomez NP for allowing me to participate in the care of this extraordinarily pleasant female. Please do not hesitate to contact me for further questions/concerns.      LORIN Butterfield [facility].  [address] [place] [address], [address]  [phone] / [phone] Fax   [phone] / [phone] Fax

## 2020-08-04 NOTE — PROGRESS NOTES
ROOM # 3  SOB, fatigued,dizziness with exertion  History of anemia   Labs done last Friday with PCP to check anemia and thyroid  Visit Vitals  BP 94/62 (BP 1 Location: Left arm, BP Patient Position: Sitting)   Pulse (!) 56   Resp 18   Ht 5' 5\" (1.651 m)   Wt 150 lb 12.8 oz (68.4 kg)   SpO2 98%   BMI 25.09 kg/m²

## 2020-09-08 NOTE — PROGRESS NOTES
HISTORY OF PRESENTING ILLNESS      Bala Masters is a 66 y.o. female with paroxysmal atrial fibrillation status post pulmonary vein isolation in 2015 in [place], hypothyroidism and severe anemia for which she has undergone multiple iron transfusions. She underwent Watchman implantation and experienced AF with RVR post procedure. Echocardiogram showed trivial effusion. She was discharged on amiodarone and subsequently converted to SR. She underwent monitoring which failed to reveal recurrence of AF while off amiodarone. Her plavix was discontinued and she has continued asprin. She presented to the ER again with AF with RVR and subsequently underwent AF ablation. She presents for follow up with recurrence of SOB. Her EKG shows atrial fibrillation. At her follow up, we discussed DCCV versus AV norbert ablation/PPM. She underwent successful restoration of sinus rhythm via DCCV. She has had recurrence of sob and fatigue, dizziness with exertion. She saw her PCP for labwork (r/o anemia and thyroid issues) which we will request. EKG today shows sinus bradycardia. Last visit, we switched to 25 mg lopressor BID and then changed formulary to Toprol daily at time of refill. She has not had recurrence of AF since DCCV.           PAST MEDICAL HISTORY     Past Medical History:   Diagnosis Date    Anemia     Atrial fibrillation ([address]     s/p pulm vein isolation 2015    Atrial fibrillation with rapid ventricular response ([address] 5/20/2017    Basal cell carcinoma     Depression     GERD (gastroesophageal reflux disease)     Hematuria 7/15/2019    Hypothyroidism     Ill-defined condition     bleeding in liver while on Xarelto    Tremor            PAST SURGICAL HISTORY     Past Surgical History:   Procedure Laterality Date    CARDIAC SURG PROCEDURE UNLIST  2015    cardioversionx4    CARDIAC SURG PROCEDURE UNLIST  2014    ABLATION     COLONOSCOPY N/A 9/6/2017    COLONOSCOPY performed by Justine Stanton Devin Fabian MD at [facility] COLONOSCOPY N/A 12/3/2019    COLONOSCOPY ( No Info to ) performed by Jenn Ayers MD at [facility] HX AFIB ABLATION      HX CATARACT REMOVAL Bilateral 2015    HX CHOLECYSTECTOMY  2016    HX DILATION AND CURETTAGE      HX GI      colonoscopy    HX ORTHOPAEDIC Bilateral     arthroscopic knee surgery    HX POLYPECTOMY      HX TONSILLECTOMY      HX TUBAL LIGATION      INTRACARD ECHO, THER/DX INTERVENT N/A 2020    Intracardiac Echocardiogram performed by Franc Chery MD at [address] [facility] CATH LAB    [place] CARDIOVERSION ELECTIVE ARRHYTHMIA EXTERNAL N/A 2020    EP CARDIOVERSION performed by Franc Chery MD at [facility] CATH LAB    OH EPHYS EVL TRNSPTL TX ATRIAL FIB ISOLAT PULM VEIN N/A 2020    ABLATION A-FIB  W COMPLETE EP STUDY performed by Franc Chery MD at [address] [place] [address], [address] CATH LAB    [place] INTRACARDIAC ELECTROPHYSIOLOGIC 3D MAPPING N/A 2020    Ep 3d Mapping performed by Franc Chery MD at [address] [place] [address], [facility] CATH LAB          ALLERGIES     No Known Allergies       FAMILY HISTORY     Family History   Problem Relation Age of Onset    Heart Disease Father     Cancer Paternal Aunt     Anesth Problems Neg Hx     negative for cardiac disease       SOCIAL HISTORY     Social History     Socioeconomic History    Marital status: SINGLE     Spouse name: Not on file    Number of children: Not on file    Years of education: Not on file    Highest education level: Not on file   Tobacco Use    Smoking status: Light Tobacco Smoker     Packs/day: 0.25     Years: 50.00     Pack years: 12.50     Types: Cigarettes     Last attempt to quit: 2019     Years since quittin.1    Smokeless tobacco: Never Used    Tobacco comment: 1-2 cigarettes per day   Substance and Sexual Activity    Alcohol use: Not Currently     Frequency: Never     Comment: OCCA    Drug use: No    Sexual activity: Not Currently     Partners: Male         MEDICATIONS     Current Outpatient Medications   Medication Sig    metoprolol tartrate (LOPRESSOR) 25 mg tablet Take 1 Tab by mouth two (2) times a day. (Patient taking differently: Take 25 mg by mouth two (2) times a day. Patient taking once daily)    pantoprazole (PROTONIX) 40 mg tablet TAKE 1 TABLET BY MOUTH DAILY    rivaroxaban (Xarelto) 20 mg tab tablet Take 1 Tab by mouth daily (with dinner).  cholecalciferol (VITAMIN D3) (5000 Units/125 mcg) tab tablet Take 5,000 Units by mouth daily.  dilTIAZem CD (CARDIZEM CD) 240 mg ER capsule Take 1 Cap by mouth daily.  levothyroxine (SYNTHROID) 100 mcg tablet Take 100 mcg by mouth Daily (before breakfast). No current facility-administered medications for this visit. I have reviewed the nurses notes, vitals, problem list, allergy list, medical history, family, social history and medications. REVIEW OF SYMPTOMS      General: Pt denies excessive weight gain or loss. Pt is able to conduct ADL's  HEENT: Denies blurred vision, headaches, hearing loss, epistaxis and difficulty swallowing. Respiratory: Denies cough, congestion, shortness of breath, MARY, wheezing or stridor. Cardiovascular: Denies precordial pain, palpitations, edema or PND  Gastrointestinal: Denies poor appetite, indigestion, abdominal pain or blood in stool  Genitourinary: Denies hematuria, dysuria, increased urinary frequency  Musculoskeletal: Denies joint pain or swelling from muscles or joints  Neurologic: Denies tremor, paresthesias, headache, or sensory motor disturbance  Psychiatric: Denies confusion, insomnia, depression  Integumentray: Denies rash, itching or ulcers. Hematologic: Denies easy bruising, bleeding       PHYSICAL EXAMINATION      Vitals: see vitals section  General: Well developed, in no acute distress.   HEENT: No jaundice, oral mucosa moist, no oral ulcers  Neck: Supple, no stiffness, no lymphadenopathy, supple  Heart: Normal S1/S2 negative S3 or S4. Regular, no murmur, gallop or rub, no jugular venous distention  Respiratory: Clear bilaterally x 4, no wheezing or rales  Abdomen:   Soft, non-tender, bowel sounds are active. Extremities:  No edema, normal cap refill, no cyanosis. Musculoskeletal: No clubbing, no deformities  Neuro: A&Ox3, speech clear, gait stable, cooperative, no focal neurologic deficits  Skin: Skin color is normal. No rashes or lesions. Non diaphoretic, moist.  Vascular: 2+ pulses symmetric in all extremities       DIAGNOSTIC DATA      EKG:        LABORATORY DATA      Lab Results   Component Value Date/Time    WBC 7.5 06/04/2020 05:28 PM    HGB 12.7 06/04/2020 05:28 PM    HCT 39.8 06/04/2020 05:28 PM    PLATELET 894 87/74/4285 05:28 PM    MCV 87.7 06/04/2020 05:28 PM      Lab Results   Component Value Date/Time    Sodium 141 07/27/2020 08:50 AM    Potassium 4.1 07/27/2020 08:50 AM    Chloride 109 (H) 07/27/2020 08:50 AM    CO2 29 07/27/2020 08:50 AM    Anion gap 3 (L) 07/27/2020 08:50 AM    Glucose 112 (H) 07/27/2020 08:50 AM    BUN 28 (H) 07/27/2020 08:50 AM    Creatinine 1.07 (H) 07/27/2020 08:50 AM    BUN/Creatinine ratio 26 (H) 07/27/2020 08:50 AM    GFR est AA >60 07/27/2020 08:50 AM    GFR est non-AA 50 (L) 07/27/2020 08:50 AM    Calcium 9.3 07/27/2020 08:50 AM    Bilirubin, total 0.3 07/11/2019 10:45 AM    Alk. phosphatase 82 07/11/2019 10:45 AM    Protein, total 6.6 07/11/2019 10:45 AM    Albumin 3.5 07/11/2019 10:45 AM    Globulin 3.1 07/11/2019 10:45 AM    A-G Ratio 1.1 07/11/2019 10:45 AM    ALT (SGPT) 14 07/11/2019 10:45 AM           ASSESSMENT      1. Atrial fibrillation                                         A. Paroxysmal                        B. PVI 2015                        C. HASBLED 2                        D. WATCHMAN 7/18/19  2. Severe anemia  3. Hypothyroidism  4. Premature ventricular contractions                        A.  Symptomatic  5.  Diverticulosis       PLAN     Recommend continue xarelto until 9/30 and then change to aspirin 81 mg daily. ICD-10-CM ICD-9-CM    1. Atrial fibrillation, unspecified type ([address]  I48.91 427.31    2. SOB (shortness of breath)  R06.02 786.05    3. S/P ablation of atrial fibrillation  Z98.890 V45.89     Z86.79     4. Presence of Watchman left atrial appendage closure device  Z95.818 V45.09    5. Hypothyroidism, unspecified type  E03.9 244.9    6. Peripheral vascular disease (HCC)  I73.9 443.9      No orders of the defined types were placed in this encounter. FOLLOW-UP     1 year   ` `               Thank you, Addie Bermeo NP for allowing me to participate in the care of this extraordinarily pleasant female. Please do not hesitate to contact me for further questions/concerns.          Javon River MD  Cardiac Electrophysiology / Cardiology    [address]  [address] [facility], [facility], [address], [facility] [address]    [facility], [facility]  [phone] / [phone] Fax   [phone] / [phone] Fax

## 2020-09-09 ENCOUNTER — OFFICE VISIT (OUTPATIENT)
Dept: CARDIOLOGY CLINIC | Age: 75
End: 2020-09-09
Payer: MEDICARE

## 2020-09-09 VITALS
WEIGHT: 149 LBS | HEART RATE: 60 BPM | DIASTOLIC BLOOD PRESSURE: 80 MMHG | HEIGHT: 65 IN | SYSTOLIC BLOOD PRESSURE: 124 MMHG | BODY MASS INDEX: 24.83 KG/M2 | OXYGEN SATURATION: 99 %

## 2020-09-09 DIAGNOSIS — Z86.79 S/P ABLATION OF ATRIAL FIBRILLATION: ICD-10-CM

## 2020-09-09 DIAGNOSIS — Z95.818 PRESENCE OF WATCHMAN LEFT ATRIAL APPENDAGE CLOSURE DEVICE: ICD-10-CM

## 2020-09-09 DIAGNOSIS — R06.02 SOB (SHORTNESS OF BREATH): ICD-10-CM

## 2020-09-09 DIAGNOSIS — E03.9 HYPOTHYROIDISM, UNSPECIFIED TYPE: ICD-10-CM

## 2020-09-09 DIAGNOSIS — I48.91 ATRIAL FIBRILLATION, UNSPECIFIED TYPE (HCC): Primary | ICD-10-CM

## 2020-09-09 DIAGNOSIS — I73.9 PERIPHERAL VASCULAR DISEASE (HCC): ICD-10-CM

## 2020-09-09 DIAGNOSIS — Z98.890 S/P ABLATION OF ATRIAL FIBRILLATION: ICD-10-CM

## 2020-09-09 PROCEDURE — G0463 HOSPITAL OUTPT CLINIC VISIT: HCPCS | Performed by: INTERNAL MEDICINE

## 2020-09-09 PROCEDURE — 1101F PT FALLS ASSESS-DOCD LE1/YR: CPT | Performed by: INTERNAL MEDICINE

## 2020-09-09 PROCEDURE — 1090F PRES/ABSN URINE INCON ASSESS: CPT | Performed by: INTERNAL MEDICINE

## 2020-09-09 PROCEDURE — G8427 DOCREV CUR MEDS BY ELIG CLIN: HCPCS | Performed by: INTERNAL MEDICINE

## 2020-09-09 PROCEDURE — 99215 OFFICE O/P EST HI 40 MIN: CPT | Performed by: INTERNAL MEDICINE

## 2020-09-09 PROCEDURE — G8536 NO DOC ELDER MAL SCRN: HCPCS | Performed by: INTERNAL MEDICINE

## 2020-09-09 PROCEDURE — G8400 PT W/DXA NO RESULTS DOC: HCPCS | Performed by: INTERNAL MEDICINE

## 2020-09-09 PROCEDURE — G8420 CALC BMI NORM PARAMETERS: HCPCS | Performed by: INTERNAL MEDICINE

## 2020-09-09 PROCEDURE — 93010 ELECTROCARDIOGRAM REPORT: CPT | Performed by: INTERNAL MEDICINE

## 2020-09-09 PROCEDURE — 93005 ELECTROCARDIOGRAM TRACING: CPT | Performed by: INTERNAL MEDICINE

## 2020-09-09 PROCEDURE — G8510 SCR DEP NEG, NO PLAN REQD: HCPCS | Performed by: INTERNAL MEDICINE

## 2020-10-06 ENCOUNTER — TELEPHONE (OUTPATIENT)
Dept: CARDIOLOGY CLINIC | Age: 75
End: 2020-10-06

## 2020-10-06 NOTE — TELEPHONE ENCOUNTER
Reason for call: Appointment on 9-9-2020 should be canceled. Per last OV, patient is due to seen Sept. 2021. Per patient she would like to cancel appointment on Friday. Appointment has been canceled. Can you set up Sept. 2021 appointment please.

## 2020-10-13 RX ORDER — METOPROLOL TARTRATE 25 MG/1
12.5 TABLET, FILM COATED ORAL 2 TIMES DAILY
Qty: 90 TAB | Refills: 3 | Status: SHIPPED | OUTPATIENT
Start: 2020-10-13

## 2020-10-14 ENCOUNTER — TRANSCRIBE ORDER (OUTPATIENT)
Dept: SCHEDULING | Age: 75
End: 2020-10-14

## 2020-10-14 DIAGNOSIS — C44.311 BASAL CELL CARCINOMA OF SKIN OF NOSE: ICD-10-CM

## 2020-10-14 DIAGNOSIS — H90.A22 SENSORINEURAL HEARING LOSS, UNILATERAL, LEFT EAR, WITH RESTRICTED HEARING ON THE CONTRALATERAL SIDE: Primary | ICD-10-CM

## 2020-10-27 RX ORDER — PANTOPRAZOLE SODIUM 40 MG/1
TABLET, DELAYED RELEASE ORAL
Qty: 90 TAB | Refills: 0 | OUTPATIENT
Start: 2020-10-27

## 2020-10-29 RX ORDER — PANTOPRAZOLE SODIUM 40 MG/1
TABLET, DELAYED RELEASE ORAL
Qty: 90 TAB | Refills: 0 | OUTPATIENT
Start: 2020-10-29

## 2020-10-29 NOTE — TELEPHONE ENCOUNTER
Requested Prescriptions     Refused Prescriptions Disp Refills    pantoprazole (PROTONIX) 40 mg tablet [Pharmacy Med Name: PANTOPRAZOLE 40MG TABLETS] 90 Tab 0     Sig: TAKE 1 TABLET BY MOUTH DAILY     Refused By: Elsie Flatness     Reason for Refusal: Medication Discontinued    pantoprazole (PROTONIX) 40 mg tablet 90 Tab 0     Refused By: Alphonse Ahumada     Reason for Refusal: Medication Discontinued     Refill refused per KALYN Khoury NP. Medication discontinued.

## 2020-10-30 ENCOUNTER — TELEPHONE (OUTPATIENT)
Dept: CARDIOLOGY CLINIC | Age: 75
End: 2020-10-30

## 2020-10-30 NOTE — TELEPHONE ENCOUNTER
Patient is calling to see why protonix was discontinued. Please advise.     Phone #: [phone]  Thanks

## 2020-10-30 NOTE — TELEPHONE ENCOUNTER
Called patient, ID verified using two patient identifiers. Informed  Patient that Protonix was not refilled by KALYN Navarro NP due to the fact that Dr. Stanford Cid only prescribes it for one month following AFib ablations and that if she continues to need the Protonix she should have her PCP prescribe it. Patient verbalizes understanding of all information.

## 2020-11-20 ENCOUNTER — TELEPHONE (OUTPATIENT)
Dept: CARDIOLOGY CLINIC | Age: 75
End: 2020-11-20

## 2020-11-20 NOTE — TELEPHONE ENCOUNTER
Returned call, ID verified using two patient identifiers. Patient states she is moving to [place] at the end of December and will need refills on her Metoprolol and Diltiazem until she can establish with a cardiologist there. Informed patient that she should have refills on both medications. She will check with her pharmacy and if refills are needed she will have them fax a request to the office. Patient verbalizes understanding of all information.

## 2020-12-04 RX ORDER — DILTIAZEM HYDROCHLORIDE 240 MG/1
CAPSULE, COATED, EXTENDED RELEASE ORAL
Qty: 90 CAP | Refills: 3 | Status: SHIPPED | OUTPATIENT
Start: 2020-12-04 | End: 2021-04-05

## 2021-04-05 RX ORDER — DILTIAZEM HYDROCHLORIDE 240 MG/1
240 CAPSULE, COATED, EXTENDED RELEASE ORAL DAILY
Qty: 90 CAP | Refills: 1 | Status: SHIPPED | OUTPATIENT
Start: 2021-04-05 | End: 2021-05-10

## 2021-04-05 NOTE — TELEPHONE ENCOUNTER
Requested Prescriptions     Signed Prescriptions Disp Refills    dilTIAZem ER (CARDIZEM CD) 240 mg capsule 90 Cap 1     Sig: Take 1 Cap by mouth daily. Authorizing Provider: Silas Jha     Ordering User: Allen Felipe     Refill per verbal order Dr. Suraj Lester. Patient has relocated to [place] and  will be establishing with cardiologist there.

## 2021-05-10 RX ORDER — DILTIAZEM HYDROCHLORIDE 240 MG/1
CAPSULE, COATED, EXTENDED RELEASE ORAL
Qty: 90 CAP | Refills: 1 | Status: SHIPPED | OUTPATIENT
Start: 2021-05-10

## 2021-05-10 NOTE — TELEPHONE ENCOUNTER
Requested Prescriptions     Signed Prescriptions Disp Refills    dilTIAZem ER (CARDIZEM CD) 240 mg capsule 90 Cap 1     Sig: TAKE 1 CAPSULE BY MOUTH DAILY     Authorizing Provider: Neeraj Trejo     Ordering User: Ezequiel Goode     Last office visit 9/9/20, next office visit 9/17/21. Refills per verbal order from Dr. Cheryl Shankar.

## 2022-04-06 NOTE — PROGRESS NOTES
Patient admitted to PACU via stretcher, arouses to [address], moves ext to command. Respirations adeq on RA spo2 100%. Skin warm and dry with good color. abd soft. Skin glue to 3 sites dry and intact. Patient denies pain at this time, will continue to monitor. ROOM    [facility] 7-27  Afib Abl 6-  Watchman 7-16-19    Chest pain: no  Shortness of breath: no  Edema: no  Palpitations, Skipped beats, Rapid heartbeat: no  Dizziness: no    New diagnosis/Surgeries: no    ER/Hospitalizations: no    Refills:

## 2022-08-30 NOTE — PATIENT INSTRUCTIONS
Needs hepatic function panel completed before I can fill Lamisil.   Recovering From Depression: Care Instructions Your Care Instructions Taking good care of yourself is important as you recover from depression. In time, your symptoms will fade as your treatment takes hold. Do not give up. Instead, focus your energy on getting better. Your mood will improve. It just takes some time. Focus on things that can help you feel better, such as being with friends and family, eating well, and getting enough rest. But take things slowly. Do not do too much too soon. You will begin to feel better gradually. Follow-up care is a key part of your treatment and safety. Be sure to make and go to all appointments, and call your doctor if you are having problems. It's also a good idea to know your test results and keep a list of the medicines you take. How can you care for yourself at home? Be realistic · If you have a large task to do, break it up into smaller steps you can handle, and just do what you can. · You may want to put off important decisions until your depression has lifted. If you have plans that will have a major impact on your life, such as marriage, divorce, or a job change, try to wait a bit. Talk it over with friends and loved ones who can help you look at the overall picture first. 
· Reaching out to people for help is important. Do not isolate yourself. Let your family and friends help you. Find someone you can trust and confide in, and talk to that person. · Be patient, and be kind to yourself. Remember that depression is not your fault and is not something you can overcome with willpower alone. Treatment is necessary for depression, just like for any other illness. Feeling better takes time, and your mood will improve little by little. Stay active · Stay busy and get outside. Take a walk, or try some other light exercise. · Talk with your doctor about an exercise program. Exercise can help with mild depression. · Go to a movie or concert. Take part in a Hindu activity or other social gathering. Go to a ball game. · Ask a friend to have dinner with you. Take care of yourself · Eat a balanced diet with plenty of fresh fruits and vegetables, whole grains, and lean protein. If you have lost your appetite, eat small snacks rather than large meals. · Avoid drinking alcohol or using illegal drugs. Do not take medicines that have not been prescribed for you. They may interfere with medicines you may be taking for depression, or they may make your depression worse. · Take your medicines exactly as they are prescribed. You may start to feel better within 1 to 3 weeks of taking antidepressant medicine. But it can take as many as 6 to 8 weeks to see more improvement. If you have questions or concerns about your medicines, or if you do not notice any improvement by 3 weeks, talk to your doctor. · If you have any side effects from your medicine, tell your doctor. Antidepressants can make you feel tired, dizzy, or nervous. Some people have dry mouth, constipation, headaches, sexual problems, or diarrhea. Many of these side effects are mild and will go away on their own after you have been taking the medicine for a few weeks. Some may last longer. Talk to your doctor if side effects are bothering you too much. You might be able to try a different medicine. · Get enough sleep. If you have problems sleeping: 
? Go to bed at the same time every night, and get up at the same time every morning. ? Keep your bedroom dark and quiet. ? Do not exercise after 5:00 p.m. ? Avoid drinks with caffeine after 5:00 p.m. · Avoid sleeping pills unless they are prescribed by the doctor treating your depression. Sleeping pills may make you groggy during the day, and they may interact with other medicine you are taking.  
· If you have any other illnesses, such as diabetes, heart disease, or high blood pressure, make sure to continue with your treatment. Tell your doctor about all of the medicines you take, including those with or without a prescription. · Keep the numbers for these national suicide hotlines: 4-948-627-TALK (6-356.202.5920) and 6-534-DDEDMKO (9-889.430.2409). If you or someone you know talks about suicide or feeling hopeless, get help right away. When should you call for help? Call 911 anytime you think you may need emergency care. For example, call if: 
  · You feel like hurting yourself or someone else.  
  · Someone you know has depression and is about to attempt or is attempting suicide.  
[facility] your doctor now or seek immediate medical care if: 
  · You hear voices.  
  · Someone you know has depression and: 
? Starts to give away his or her possessions. ? Uses illegal drugs or drinks alcohol heavily. ? Talks or writes about death, including writing suicide notes or talking about guns, knives, or pills. ? Starts to spend a lot of time alone. ? Acts very aggressively or suddenly appears calm.  
 Watch closely for changes in your health, and be sure to contact your doctor if: 
  · You do not get better as expected. Where can you learn more? Go to http://martínez-renny.info/. Enter M676 in the search box to learn more about \"Recovering From Depression: Care Instructions. \" Current as of: December 7, 2017 Content Version: 11.8 © 8929-0823 [employer]. Care instructions adapted under license by [employer] (which disclaims liability or warranty for this information). If you have questions about a medical condition or this instruction, always ask your healthcare professional. [place] [address] any warranty or liability for your use of this information.

## 2023-03-02 NOTE — H&P
VIRTUAL VISIT DOCUMENTATION      Pursuant to the emergency declaration under the [facility], [facility] waiver authority and the [employer] Act, this Virtual  Visit was conducted, with patient's consent, to reduce the patient's risk of exposure to COVID-19 and provide continuity of care for an established patient. Services were provided through a video synchronous discussion virtually to substitute for in-person clinic visit.        HISTORY OF PRESENTING ILLNESS      Alison Odom is a 66 y.o. female who was seen by synchronous (real-time) audio-video technology on 6/5/2020 with paroxysmal atrial fibrillation status post pulmonary vein isolation in 2015 in [place], hypothyroidism and severe anemia for which she has undergone multiple iron transfusions. Jennifer Alvarado was previously on rivaroxaban for CVA risk reduction of her atrial fibrillation however this was discontinued. Nuclear stress test in 5/2017 demonstrated preserved LV function without ischemia. She underwent holter monitor which demonstrated 20% PVC burden and failed to reveal atrial fibrillation. She was offered Flecainide 50mg BID however she was hesitant to start this. She was seen by hematologist who felt that her anemia was not driving her symptoms.  We started a trial of low-dose Toprol which has been ineffective in improving her symptoms and was discontinued. She underwent Watchman implantation and experienced AF with RVR post procedure. Echocardiogram showed trivial effusion only. She was discharged on amiodarone and subsequently converted to SR. She underwent monitoring which failed to reveal recurrence of AF while off amiodarone. She reports easy bruising while on plavix/aspirin. Also has had left lower extremity ultrasound.     Her plavix was discontinued and she has continued asprin. Venous ultrasounds were negative for DVT.  EKG shows sinus bradycardia.      She presented to the ER with AF with RVR and was given cardizem 20 mg IV and was discharged with metoprolol being added to her regimen. She remains in AF but rates are in the 90s.           ACTIVE PROBLEM LIST            Patient Active Problem List     Diagnosis Date Noted    Presence of Watchman left atrial appendage closure device 07/16/2019       Priority: 1 - One    Peripheral vascular disease ([address] 12/18/2019    Atrial fibrillation ([address] 07/16/2019    Hematuria 07/15/2019    Other hyperlipidemia 02/02/2019    Symptomatic anemia 08/23/2017    Hypothyroid 08/23/2017    Cigarette nicotine dependence without complication 40/11/8867    Dental infection 05/20/2017    Calculus of gallbladder with chronic cholecystitis without obstruction 08/15/2016    S/P ablation of atrial fibrillation 08/15/2016    Hashimoto's thyroiditis 08/15/2016    Irritable bowel syndrome with diarrhea 08/15/2016    Paroxysmal A-fib ([address] 09/24/2015             PAST MEDICAL HISTORY           Past Medical History:   Diagnosis Date    Anemia      Atrial fibrillation (HCC)       s/p pulm vein isolation 2015    Atrial fibrillation with rapid ventricular response ([address] 5/20/2017    Basal cell carcinoma      Depression      GERD (gastroesophageal reflux disease)      Hematuria 7/15/2019    Hypothyroidism      Ill-defined condition       bleeding in liver while on Xarelto    Tremor               PAST SURGICAL HISTORY            Past Surgical History:   Procedure Laterality Date    CARDIAC SURG PROCEDURE UNLIST   2015     cardioversionx4    CARDIAC SURG PROCEDURE UNLIST   2014     ABLATION     COLONOSCOPY N/A 9/6/2017     COL[facility] Leonid [facility] at [facility] COLONOSCOPY N/A 12/3/2019     COLONOSCOPY ( No Info to ) performed by Sayra Olivo MD at [address] [facility] HX [address] HX AFIB ABLATION   2016    HX CATARACT REMOVAL Bilateral 2015    HX CHOLECYSTECTOMY   2016    HX DILATION AND CURETTAGE       HX GI         colonoscopy    HX ORTHOPAEDIC Bilateral      arthroscopic knee surgery    HX POLYPECTOMY       HX TONSILLECTOMY   1948    HX TUBAL LIGATION                ALLERGIES      No Known Allergies         FAMILY HISTORY            Family History   Problem Relation Age of Onset    Heart Disease Father      Cancer Paternal Aunt      Anesth Problems Neg Hx      negative for cardiac disease         SOCIAL HISTORY      Social History               Socioeconomic History    Marital status: SINGLE       Spouse name: Not on file    Number of children: Not on file    Years of education: Not on file    Highest education level: Not on file   Tobacco Use    Smoking status: Former Smoker       Packs/day: 0.50       Years: 50.00       Pack years: 25.00       Last attempt to quit: 2019       Years since quittin.8    Smokeless tobacco: Never Used   Substance and Sexual Activity    Alcohol use: Not Currently       Frequency: Never       Comment: OCCA    Drug use: No    Sexual activity: Not Currently       Partners: Male               MEDICATIONS      Current Outpatient Medications   Medication Sig    cholecalciferol (VITAMIN D3) (5000 Units/125 mcg) tab tablet Take 5,000 Units by mouth daily.  metoprolol tartrate (LOPRESSOR) 25 mg tablet Take 1 Tab by mouth two (2) times a day for 30 days.  dilTIAZem CD (CARDIZEM CD) 240 mg ER capsule Take 1 Cap by mouth daily.  aspirin delayed-release 81 mg tablet Take 1 Tab by mouth daily.  levothyroxine (SYNTHROID) 100 mcg tablet Take 100 mcg by mouth Daily (before breakfast).      No current facility-administered medications for this visit.          I have reviewed the nurses notes, vitals, problem list, allergy list, medical history, family, social history and medications.         REVIEW OF SYMPTOMS      General: Pt denies excessive weight gain or loss.  Pt is able to conduct ADL's  HEENT: Denies blurred vision, headaches, hearing loss, epistaxis and difficulty swallowing. Respiratory: Denies cough, congestion, shortness of breath, MARY, wheezing or stridor. Cardiovascular: Denies precordial pain, palpitations, edema or PND  Gastrointestinal: Denies poor appetite, indigestion, abdominal pain or blood in stool  Genitourinary: Denies hematuria, dysuria, increased urinary frequency  Musculoskeletal: Denies joint pain or swelling from muscles or joints  Neurologic: Denies tremor, paresthesias, headache, or sensory motor disturbance  Psychiatric: Denies confusion, insomnia, depression  Integumentray: Denies rash, itching or ulcers. Hematologic: Denies easy bruising, bleeding         PHYSICAL EXAMINATION      Due to this being a TeleHealth evaluation, many elements of the physical examination are unable to be assessed.      General: Well developed, in no acute distress, cooperative and alert  HEENT: Pupils equal/round. No marked JVD visible on video. Respiratory: No audible wheezing, no signs of respiratory distress, lips non cyanotic  Extremities:  No edema  Neuro: A&Ox3, speech clear, no facial droop, answering questions appropriately  Skin: Skin color is normal. No rashes or lesions.  Non diaphoretic on visible skin during exam         DIAGNOSTIC DATA      EKG:          LABORATORY DATA            Lab Results   Component Value Date/Time     WBC 7.5 06/04/2020 05:28 PM     HGB 12.7 06/04/2020 05:28 PM     HCT 39.8 06/04/2020 05:28 PM     PLATELET 740 45/51/7428 05:28 PM     MCV 87.7 06/04/2020 05:28 PM            Lab Results   Component Value Date/Time     Sodium 140 06/04/2020 05:28 PM     Potassium 4.2 06/04/2020 05:28 PM     Chloride 111 (H) 06/04/2020 05:28 PM     CO2 25 06/04/2020 05:28 PM     Anion gap 4 (L) 06/04/2020 05:28 PM     Glucose 99 06/04/2020 05:28 PM     BUN 27 (H) 06/04/2020 05:28 PM     Creatinine 1.21 (H) 06/04/2020 05:28 PM     BUN/Creatinine ratio 22 (H) 06/04/2020 05:28 PM     GFR est AA 52 (L) 06/04/2020 05:28 PM     GFR est non-AA 43 (L) 06/04/2020 05:28 PM     Calcium 10.1 06/04/2020 05:28 PM     Bilirubin, total 0.3 07/11/2019 10:45 AM     Alk. phosphatase 82 07/11/2019 10:45 AM     Protein, total 6.6 07/11/2019 10:45 AM     Albumin 3.5 07/11/2019 10:45 AM     Globulin 3.1 07/11/2019 10:45 AM     A-G Ratio 1.1 07/11/2019 10:45 AM     ALT (SGPT) 14 07/11/2019 10:45 AM             ASSESSMENT      1. Atrial fibrillation                                         X. Paroxysmal                        T. PVI 2015                        CKathi HAJI 2                        D. WATCHMAN 7/18/19  2. Severe anemia  3. Hypothyroidism  4. Premature ventricular contractions                        A.  Symptomatic  5. Diverticulosis            ICD-10-CM ICD-9-CM     1. Atrial fibrillation, unspecified type ([address] I48.91 427.31     2. S/P ablation of atrial fibrillation Z98.890 V45.89       Z86.79       3. Presence of Watchman left atrial appendage closure device Z95.818 V45.09     4. Hypothyroidism, unspecified type E03.9 244. 9     5. Peripheral vascular disease (HCC) I73.9 443. 9     6.  SOB (shortness of breath) R06.02 786.05        No orders of the defined types were placed in this encounter.         PLAN     AF ablation               Pop Lawton MD  Cardiac Electrophysiology / Cardiology     [address] [place] [address], [address] [address] [address], [address]  [place] [facility]                                         [address] [facility]  [phone] / [phone] Fax                        [phone] / [phone] Fax    Ilum[place] Counseling: I discussed with the patient the risks of tildrakizumab including but not limited to immunosuppression, malignancy, posterior leukoencephalopathy syndrome, and serious infections.  The patient understands that monitoring is required including a PPD at baseline and must alert us or the primary physician if symptoms of infection or other concerning signs are noted.

## (undated) DEVICE — TORFLEX TRANSSEPTAL SHEATH; TRANSSEPTAL DILATOR; J-TIP GUIDEWIRE: Brand: TORFLEX TRANSSEPTAL GUIDING SHEATH

## (undated) DEVICE — Device: Brand: RFP-100A CONNECTOR CABLE

## (undated) DEVICE — BITEBLOCK ENDOSCP 60FR MAXI WHT POLYETH STURDY W/ VELC WVN

## (undated) DEVICE — ELECTRODE,RADIOTRANSLUCENT,FOAM,3PK: Brand: MEDLINE

## (undated) DEVICE — Device: Brand: PADPRO

## (undated) DEVICE — PROCEDURE KIT FLUID MGMT CUST MAINFOLD STRL

## (undated) DEVICE — SYR 50ML LR LCK 1ML GRAD NSAF --

## (undated) DEVICE — CABLE RMFG CATH 34PIN ECO 2.7M

## (undated) DEVICE — SOLIDIFIER MEDC 1200ML -- CONVERT TO 356117

## (undated) DEVICE — KENDALL RADIOLUCENT FOAM MONITORING ELECTRODE -RECTANGULAR SHAPE: Brand: KENDALL

## (undated) DEVICE — LUER-LOK 360°: Brand: CONNECTA, LUER-LOK

## (undated) DEVICE — BAG BELONG PT PERS CLEAR HANDL

## (undated) DEVICE — MEDI-TRACE CADENCE ADULT, DEFIBRILLATION ELECTRODE -RTS  (10 PR/PK) - PHYSIO-CONTROL: Brand: MEDI-TRACE CADENCE

## (undated) DEVICE — CATHETER ABLAT 8FR L115CM 1-6-2MM SPC TIP 3.5MM FJ CRV

## (undated) DEVICE — CATH RMFG LASSO NAVI 25MM-15MM -- LAWSON OEM ITEM 313046

## (undated) DEVICE — PRESSURE MONITORING SET: Brand: TRUWAVE

## (undated) DEVICE — TUBE SET IRR PUMP THERMALCOOL -- SMARTABLATE

## (undated) DEVICE — Device: Brand: NRG TRANSSEPTAL NEEDLE

## (undated) DEVICE — PINNACLE INTRODUCER SHEATH: Brand: PINNACLE

## (undated) DEVICE — CHECK-FLO PERFORMER INTRODUCER: Brand: PERFORMER

## (undated) DEVICE — CABLE CONN TO CATH TO CARTO 3 --

## (undated) DEVICE — CABLE RMFG ELCTRPHYSLGY C3 NAV --

## (undated) DEVICE — REM POLYHESIVE ADULT PATIENT RETURN ELECTRODE: Brand: VALLEYLAB

## (undated) DEVICE — DRESSING HEMOSTATIC SFT INTVENT W/O SLT DBL WRP QUIKCLOT LF

## (undated) DEVICE — CONTAINER SPEC 20 ML LID NEUT BUFF FORMALIN 10 % POLYPR STS

## (undated) DEVICE — KIT COLON W/ 1.1OZ LUB AND 2 END

## (undated) DEVICE — CATH BI DIR 7FR DEFL CS NON --

## (undated) DEVICE — ANGIOGRAPHIC CATHETER: Brand: IMPULSE™

## (undated) DEVICE — SHTH GUID 8.5F 17MM SM CRV -- CARTO VIZIGO

## (undated) DEVICE — PATCH CARTO 3 EXT REF --

## (undated) DEVICE — LEFT ATRIAL APPENDAGE CLOSURE DEVICE WITH DELIVERY SYSTEM
Type: IMPLANTABLE DEVICE | Status: NON-FUNCTIONAL
Brand: WATCHMAN®

## (undated) DEVICE — CATH IV AUTOGRD BC BLU 22GA 25 -- INSYTE

## (undated) DEVICE — 1200 GUARD II KIT W/5MM TUBE W/O VAC TUBE: Brand: GUARDIAN

## (undated) DEVICE — 36" (91 CM) ARTERIAL PRESSURE TUBING: Brand: ICU MEDICAL

## (undated) DEVICE — PROBE ES TEMP HOT AND CLD FAST ACCURATE SFT FLX CIRCA S CATH

## (undated) DEVICE — COVER CATH ACUNAV ULTRASOUND 5X72IN ANTI STATIC

## (undated) DEVICE — SNARE ENDOSCP M L240CM W27MM SHTH DIA2.4MM CHN 2.8MM OVL

## (undated) DEVICE — NDL FLTR TIP 5 MIC 18GX1.5IN --

## (undated) DEVICE — SET ADMIN 16ML TBNG L100IN 2 Y INJ SITE IV PIGGY BK DISP

## (undated) DEVICE — FORCEPS BX L240CM JAW DIA2.8MM L CAP W/ NDL MIC MESH TOOTH

## (undated) DEVICE — BASIN EMESIS 500CC ROSE 250/CS 60/PLT: Brand: MEDEGEN MEDICAL PRODUCTS, LLC

## (undated) DEVICE — Device

## (undated) DEVICE — PERCLOSE PROGLIDE™ SUTURE-MEDIATED CLOSURE SYSTEM: Brand: PERCLOSE PROGLIDE™

## (undated) DEVICE — TUBING ADMIN SET INTRAV ARTERI -- CONVERT TO ITEM 340436

## (undated) DEVICE — TOWEL,OR,DSP,ST,BLUE,STD,2/PK,40PK/CS: Brand: MEDLINE

## (undated) DEVICE — CATH ANGI 5F STRD145DPTAIL 110 -- SITESEER

## (undated) DEVICE — CATH IV AUTOGRD BC PNK 20GA 25 -- INSYTE

## (undated) DEVICE — FIAPC® PROBE W/ FILTER 2200 C OD 2.3MM/6.9FR; L 2.2M/7.2FT: Brand: ERBE

## (undated) DEVICE — TOWEL SURG W17XL27IN STD BLU COT NONFENESTRATED PREWASHED

## (undated) DEVICE — PACK PROCEDURE SURG HRT CATH

## (undated) DEVICE — 3M™ CUROS™ DISINFECTING CAP FOR NEEDLELESS CONNECTORS 270/CARTON 20 CARTONS/CASE CFF1-270: Brand: CUROS™

## (undated) DEVICE — ADULT SPO2 SENSOR: Brand: NELLCOR

## (undated) DEVICE — CUFF RMFG BP INF SZ 11 DISP -- LAWSON OEM ITEM 238915

## (undated) DEVICE — NDL PRT INJ NSAF BLNT 18GX1.5 --

## (undated) DEVICE — CATH ECO ULTRASOUND 8FR SIEMEN -- SOUNDSTAR

## (undated) DEVICE — CANNULA CUSH AD W/ 14FT TBG

## (undated) DEVICE — Device: Brand: PROTRACK PIGTAIL WIRE

## (undated) DEVICE — TRAP SUC MUCOUS 70ML -- MEDICHOICE MEDLINE

## (undated) DEVICE — BAG SPEC BIOHZRD 10 X 10 IN --

## (undated) DEVICE — CABLE RMFG DECA 34/LBLU 10/BLK -- F/CARTO 3 SYS - OEM 332259

## (undated) DEVICE — FIAPC® PROBE W/ FILTER 2200 A OD 2.3MM/6.9FR; L 2.2M/7.2FT: Brand: ERBE

## (undated) DEVICE — SYR 5ML 1/5 GRAD LL NSAF LF --

## (undated) DEVICE — BASIN EMSIS 16OZ GRAPHITE PLAS KID SHP MOLD GRAD FOR ORAL

## (undated) DEVICE — ACCESS SHEATH WITH DILATOR: Brand: WATCHMAN® ACCESS SYSTEM